# Patient Record
Sex: FEMALE | Race: BLACK OR AFRICAN AMERICAN | NOT HISPANIC OR LATINO | Employment: FULL TIME | URBAN - METROPOLITAN AREA
[De-identification: names, ages, dates, MRNs, and addresses within clinical notes are randomized per-mention and may not be internally consistent; named-entity substitution may affect disease eponyms.]

---

## 2021-04-16 ENCOUNTER — BULK ORDERING (OUTPATIENT)
Dept: CASE MANAGEMENT | Facility: OTHER | Age: 26
End: 2021-04-16

## 2021-04-16 DIAGNOSIS — Z23 IMMUNIZATION DUE: ICD-10-CM

## 2023-08-28 LAB
ALBUMIN SERPL-MCNC: 4 G/DL (ref 3.5–5.2)
ALBUMIN/GLOB SERPL: 1.4 G/DL
ALP SERPL-CCNC: 76 U/L (ref 39–117)
ALT SERPL W P-5'-P-CCNC: <5 U/L (ref 1–33)
ANION GAP SERPL CALCULATED.3IONS-SCNC: 10.6 MMOL/L (ref 5–15)
AST SERPL-CCNC: 11 U/L (ref 1–32)
BASOPHILS # BLD AUTO: 0.01 10*3/MM3 (ref 0–0.2)
BASOPHILS NFR BLD AUTO: 0.3 % (ref 0–1.5)
BILIRUB SERPL-MCNC: 0.2 MG/DL (ref 0–1.2)
BUN SERPL-MCNC: 13 MG/DL (ref 6–20)
BUN/CREAT SERPL: 17.3 (ref 7–25)
CALCIUM SPEC-SCNC: 9.3 MG/DL (ref 8.6–10.5)
CHLORIDE SERPL-SCNC: 107 MMOL/L (ref 98–107)
CO2 SERPL-SCNC: 23.4 MMOL/L (ref 22–29)
CREAT SERPL-MCNC: 0.75 MG/DL (ref 0.57–1)
DEPRECATED RDW RBC AUTO: 38.6 FL (ref 37–54)
EGFRCR SERPLBLD CKD-EPI 2021: 110.7 ML/MIN/1.73
EOSINOPHIL # BLD AUTO: 0.09 10*3/MM3 (ref 0–0.4)
EOSINOPHIL NFR BLD AUTO: 2.4 % (ref 0.3–6.2)
ERYTHROCYTE [DISTWIDTH] IN BLOOD BY AUTOMATED COUNT: 12.8 % (ref 12.3–15.4)
GLOBULIN UR ELPH-MCNC: 2.9 GM/DL
GLUCOSE BLDC GLUCOMTR-MCNC: 119 MG/DL (ref 70–130)
GLUCOSE SERPL-MCNC: 101 MG/DL (ref 65–99)
HCT VFR BLD AUTO: 33.1 % (ref 34–46.6)
HGB BLD-MCNC: 10.9 G/DL (ref 12–15.9)
IMM GRANULOCYTES # BLD AUTO: 0.01 10*3/MM3 (ref 0–0.05)
IMM GRANULOCYTES NFR BLD AUTO: 0.3 % (ref 0–0.5)
LYMPHOCYTES # BLD AUTO: 1.94 10*3/MM3 (ref 0.7–3.1)
LYMPHOCYTES NFR BLD AUTO: 52.2 % (ref 19.6–45.3)
MCH RBC QN AUTO: 27.5 PG (ref 26.6–33)
MCHC RBC AUTO-ENTMCNC: 32.9 G/DL (ref 31.5–35.7)
MCV RBC AUTO: 83.4 FL (ref 79–97)
MONOCYTES # BLD AUTO: 0.28 10*3/MM3 (ref 0.1–0.9)
MONOCYTES NFR BLD AUTO: 7.5 % (ref 5–12)
NEUTROPHILS NFR BLD AUTO: 1.39 10*3/MM3 (ref 1.7–7)
NEUTROPHILS NFR BLD AUTO: 37.3 % (ref 42.7–76)
NRBC BLD AUTO-RTO: 0 /100 WBC (ref 0–0.2)
PLATELET # BLD AUTO: 237 10*3/MM3 (ref 140–450)
PMV BLD AUTO: 10.9 FL (ref 6–12)
POTASSIUM SERPL-SCNC: 3.6 MMOL/L (ref 3.5–5.2)
PROT SERPL-MCNC: 6.9 G/DL (ref 6–8.5)
RBC # BLD AUTO: 3.97 10*6/MM3 (ref 3.77–5.28)
SODIUM SERPL-SCNC: 141 MMOL/L (ref 136–145)
WBC NRBC COR # BLD: 3.72 10*3/MM3 (ref 3.4–10.8)

## 2023-08-28 PROCEDURE — 36415 COLL VENOUS BLD VENIPUNCTURE: CPT

## 2023-08-28 PROCEDURE — 85025 COMPLETE CBC W/AUTO DIFF WBC: CPT

## 2023-08-28 PROCEDURE — 99283 EMERGENCY DEPT VISIT LOW MDM: CPT

## 2023-08-28 PROCEDURE — 82948 REAGENT STRIP/BLOOD GLUCOSE: CPT

## 2023-08-28 PROCEDURE — 93010 ELECTROCARDIOGRAM REPORT: CPT | Performed by: INTERNAL MEDICINE

## 2023-08-28 PROCEDURE — 80053 COMPREHEN METABOLIC PANEL: CPT

## 2023-08-28 PROCEDURE — 84703 CHORIONIC GONADOTROPIN ASSAY: CPT | Performed by: EMERGENCY MEDICINE

## 2023-08-28 PROCEDURE — 93005 ELECTROCARDIOGRAM TRACING: CPT

## 2023-08-28 RX ORDER — SODIUM CHLORIDE 0.9 % (FLUSH) 0.9 %
10 SYRINGE (ML) INJECTION AS NEEDED
Status: DISCONTINUED | OUTPATIENT
Start: 2023-08-28 | End: 2023-08-29 | Stop reason: HOSPADM

## 2023-08-29 ENCOUNTER — HOSPITAL ENCOUNTER (EMERGENCY)
Facility: HOSPITAL | Age: 28
Discharge: HOME | End: 2023-08-29
Attending: EMERGENCY MEDICINE
Payer: COMMERCIAL

## 2023-08-29 VITALS
BODY MASS INDEX: 26.51 KG/M2 | WEIGHT: 179 LBS | DIASTOLIC BLOOD PRESSURE: 82 MMHG | HEART RATE: 54 BPM | SYSTOLIC BLOOD PRESSURE: 117 MMHG | OXYGEN SATURATION: 100 % | TEMPERATURE: 98.4 F | RESPIRATION RATE: 16 BRPM | HEIGHT: 69 IN

## 2023-08-29 DIAGNOSIS — R53.1 WEAKNESS: ICD-10-CM

## 2023-08-29 DIAGNOSIS — R51.9 NONINTRACTABLE HEADACHE, UNSPECIFIED CHRONICITY PATTERN, UNSPECIFIED HEADACHE TYPE: Primary | ICD-10-CM

## 2023-08-29 DIAGNOSIS — R42 LIGHTHEADEDNESS: ICD-10-CM

## 2023-08-29 LAB
BACTERIA UR QL AUTO: ABNORMAL /HPF
BILIRUB UR QL STRIP: NEGATIVE
CLARITY UR: CLEAR
COLOR UR: YELLOW
GLUCOSE UR STRIP-MCNC: NEGATIVE MG/DL
HCG SERPL QL: NEGATIVE
HGB UR QL STRIP.AUTO: ABNORMAL
HOLD SPECIMEN: NORMAL
HOLD SPECIMEN: NORMAL
HYALINE CASTS UR QL AUTO: ABNORMAL /LPF
KETONES UR QL STRIP: NEGATIVE
LEUKOCYTE ESTERASE UR QL STRIP.AUTO: NEGATIVE
NITRITE UR QL STRIP: NEGATIVE
PH UR STRIP.AUTO: 6.5 [PH] (ref 5–8)
PROT UR QL STRIP: NEGATIVE
QT INTERVAL: 410 MS
QTC INTERVAL: 385 MS
RBC # UR STRIP: ABNORMAL /HPF
REF LAB TEST METHOD: ABNORMAL
SP GR UR STRIP: 1.02 (ref 1–1.03)
SQUAMOUS #/AREA URNS HPF: ABNORMAL /HPF
UROBILINOGEN UR QL STRIP: ABNORMAL
WBC # UR STRIP: ABNORMAL /HPF
WHOLE BLOOD HOLD COAG: NORMAL
WHOLE BLOOD HOLD SPECIMEN: NORMAL

## 2023-08-29 PROCEDURE — 81001 URINALYSIS AUTO W/SCOPE: CPT

## 2023-08-29 NOTE — DISCHARGE INSTRUCTIONS
Rest as needed.  Drink plenty of fluids as tolerated.  Please return to the emergency room for any worsening symptoms or concerns.

## 2023-08-29 NOTE — Clinical Note
[facility] EMERGENCY DEPARTMENT  [address] [facility] [address]  Phone: [phone]    Osbaldo Mayo was seen and treated in our emergency department on 8/28/2023.  She may return to work on 08/30/2023.         Thank you for choosing [facility].    Holli Lawrence RN

## 2023-08-29 NOTE — ED PROVIDER NOTES
EMERGENCY DEPARTMENT ENCOUNTER    Room Number:  21/21  Date seen:  8/29/2023  PCP: Provider, No Known  Historian(s): Patient      HPI:  Chief Complaint: Lightheadedness, headache, nausea  A complete HPI/ROS/PMH/PSH/SH/FH are unobtainable / limited due to: None   Context: Osbaldo Mayo is a 27 y.o. female who presents to the ED c/o headache with lightheadedness and fatigue that is worrisome for possible anemia problem.  Patient has had anemia in the past and she was previously taking iron supplements but stopped that medication several months ago.  She just began her menstrual cycle this week.  Today she became concerned about increasing fatigue and some nausea and some near syncopal feelings.  The symptoms reminded her of when she was significantly anemic earlier and so she was concerned that perhaps her blood counts were trending down again.  She denies any fevers or cough or cold or flulike symptoms.  She did not vomit with her nausea today.  She has a generalized headache.  She has been trying to drink plenty of fluids today.        PAST MEDICAL HISTORY  Active Ambulatory Problems     Diagnosis Date Noted    No Active Ambulatory Problems     Resolved Ambulatory Problems     Diagnosis Date Noted    No Resolved Ambulatory Problems     Past Medical History:   Diagnosis Date    Anemia     Hypoglycemia          PAST SURGICAL HISTORY  History reviewed. No pertinent surgical history.      FAMILY HISTORY  Family History   Problem Relation Age of Onset    No Known Problems Mother     No Known Problems Father     No Known Problems Sister     No Known Problems Brother     No Known Problems Maternal Aunt     No Known Problems Maternal Uncle     No Known Problems Paternal Aunt     No Known Problems Paternal Uncle     No Known Problems Maternal Grandmother     No Known Problems Maternal Grandfather     No Known Problems Paternal Grandmother     No Known Problems Paternal Grandfather     Cancer Other         Breat     Diabetes Other     Hypertension Other     Anesthesia problems Neg Hx     Broken bones Neg Hx     Clotting disorder Neg Hx     Collagen disease Neg Hx     Dislocations Neg Hx     Osteoporosis Neg Hx     Rheumatologic disease Neg Hx     Scoliosis Neg Hx     Severe sprains Neg Hx          SOCIAL HISTORY  Social History     Socioeconomic History    Marital status: Single   Tobacco Use    Smoking status: Every Day     Packs/day: 0.50     Types: Cigarettes   Substance and Sexual Activity    Alcohol use: Not Currently     Comment: occasional    Drug use: No    Sexual activity: Defer         ALLERGIES  Patient has no known allergies.        REVIEW OF SYSTEMS  Review of Systems   Constitutional:  Positive for fatigue. Negative for activity change and fever.   Eyes:  Negative for pain and visual disturbance.   Respiratory:  Negative for cough and shortness of breath.    Cardiovascular:  Negative for chest pain.   Gastrointestinal:  Positive for nausea. Negative for abdominal pain, blood in stool and vomiting.   Genitourinary:  Positive for vaginal bleeding. Negative for dysuria.   Skin:  Negative for color change.   Neurological:  Positive for weakness, light-headedness and headaches. Negative for syncope.   All other systems reviewed and are negative.         PHYSICAL EXAM  ED Triage Vitals   Temp Heart Rate Resp BP SpO2   08/28/23 2239 08/28/23 2239 08/28/23 2239 08/28/23 2244 08/28/23 2239   98.4 °F (36.9 °C) 68 16 123/78 100 %      Temp src Heart Rate Source Patient Position BP Location FiO2 (%)   08/28/23 2239 08/28/23 2239 08/28/23 2244 08/28/23 2244 --   Tympanic Monitor Sitting Left arm        Physical Exam      GENERAL: Calm, nontoxic appearing, no diaphoresis, no acute distress  HENT: nares patent, normocephalic and atraumatic  EYES: no scleral icterus, EOMI, normal conjunctivae  CV: regular rhythm, normal rate, normal distal pulses, no murmurs  RESPIRATORY: normal effort, no stridor lungs clear to auscultation  bilaterally  ABDOMEN: soft nontender in all quadrants  MUSCULOSKELETAL: no deformity, no asymmetry  NEURO: alert, moves all extremities, follows commands  PSYCH:  calm, cooperative  SKIN: warm, dry    Vital signs and nursing notes reviewed.        LAB RESULTS  Recent Results (from the past 24 hour(s))   Comprehensive Metabolic Panel    Collection Time: 08/28/23 10:49 PM    Specimen: Arm, Left; Blood   Result Value Ref Range    Glucose 101 (H) 65 - 99 mg/dL    BUN 13 6 - 20 mg/dL    Creatinine 0.75 0.57 - 1.00 mg/dL    Sodium 141 136 - 145 mmol/L    Potassium 3.6 3.5 - 5.2 mmol/L    Chloride 107 98 - 107 mmol/L    CO2 23.4 22.0 - 29.0 mmol/L    Calcium 9.3 8.6 - 10.5 mg/dL    Total Protein 6.9 6.0 - 8.5 g/dL    Albumin 4.0 3.5 - 5.2 g/dL    ALT (SGPT) <5 1 - 33 U/L    AST (SGOT) 11 1 - 32 U/L    Alkaline Phosphatase 76 39 - 117 U/L    Total Bilirubin 0.2 0.0 - 1.2 mg/dL    Globulin 2.9 gm/dL    A/G Ratio 1.4 g/dL    BUN/Creatinine Ratio 17.3 7.0 - 25.0    Anion Gap 10.6 5.0 - 15.0 mmol/L    eGFR 110.7 >60.0 mL/min/1.73   Green Top (Gel)    Collection Time: 08/28/23 10:49 PM   Result Value Ref Range    Extra Tube Hold for add-ons.    Lavender Top    Collection Time: 08/28/23 10:49 PM   Result Value Ref Range    Extra Tube hold for add-on    Gold Top - SST    Collection Time: 08/28/23 10:49 PM   Result Value Ref Range    Extra Tube Hold for add-ons.    Light Blue Top    Collection Time: 08/28/23 10:49 PM   Result Value Ref Range    Extra Tube Hold for add-ons.    CBC Auto Differential    Collection Time: 08/28/23 10:49 PM    Specimen: Arm, Left; Blood   Result Value Ref Range    WBC 3.72 3.40 - 10.80 10*3/mm3    RBC 3.97 3.77 - 5.28 10*6/mm3    Hemoglobin 10.9 (L) 12.0 - 15.9 g/dL    Hematocrit 33.1 (L) 34.0 - 46.6 %    MCV 83.4 79.0 - 97.0 fL    MCH 27.5 26.6 - 33.0 pg    MCHC 32.9 31.5 - 35.7 g/dL    RDW 12.8 12.3 - 15.4 %    RDW-SD 38.6 37.0 - 54.0 fl    MPV 10.9 6.0 - 12.0 fL    Platelets 237 140 - 450 10*3/mm3     Neutrophil % 37.3 (L) 42.7 - 76.0 %    Lymphocyte % 52.2 (H) 19.6 - 45.3 %    Monocyte % 7.5 5.0 - 12.0 %    Eosinophil % 2.4 0.3 - 6.2 %    Basophil % 0.3 0.0 - 1.5 %    Immature Grans % 0.3 0.0 - 0.5 %    Neutrophils, Absolute 1.39 (L) 1.70 - 7.00 10*3/mm3    Lymphocytes, Absolute 1.94 0.70 - 3.10 10*3/mm3    Monocytes, Absolute 0.28 0.10 - 0.90 10*3/mm3    Eosinophils, Absolute 0.09 0.00 - 0.40 10*3/mm3    Basophils, Absolute 0.01 0.00 - 0.20 10*3/mm3    Immature Grans, Absolute 0.01 0.00 - 0.05 10*3/mm3    nRBC 0.0 0.0 - 0.2 /100 WBC   hCG, Serum, Qualitative    Collection Time: 08/28/23 10:49 PM    Specimen: Arm, Left; Blood   Result Value Ref Range    HCG Qualitative Negative Negative   POC Glucose Once    Collection Time: 08/28/23 10:53 PM    Specimen: Blood   Result Value Ref Range    Glucose 119 70 - 130 mg/dL   ECG 12 Lead ED Triage Standing Order; Weak / Dizzy / AMS    Collection Time: 08/28/23 10:55 PM   Result Value Ref Range    QT Interval 410 ms    QTC Interval 385 ms   Urinalysis With Microscopic If Indicated (No Culture) - Urine, Clean Catch    Collection Time: 08/29/23 12:55 AM    Specimen: Urine, Clean Catch   Result Value Ref Range    Color, UA Yellow Yellow, Straw    Appearance, UA Clear Clear    pH, UA 6.5 5.0 - 8.0    Specific Gravity, UA 1.025 1.005 - 1.030    Glucose, UA Negative Negative    Ketones, UA Negative Negative    Bilirubin, UA Negative Negative    Blood, UA Moderate (2+) (A) Negative    Protein, UA Negative Negative    Leuk Esterase, UA Negative Negative    Nitrite, UA Negative Negative    Urobilinogen, UA 1.0 E.U./dL 0.2 - 1.0 E.U./dL   Urinalysis, Microscopic Only - Urine, Clean Catch    Collection Time: 08/29/23 12:55 AM    Specimen: Urine, Clean Catch   Result Value Ref Range    RBC, UA Too Numerous to Count (A) None Seen, 0-2 /HPF    WBC, UA 0-2 None Seen, 0-2 /HPF    Bacteria, UA None Seen None Seen /HPF    Squamous Epithelial Cells, UA 0-2 None Seen, 0-2 /HPF    Hyaline  Casts, UA 0-2 None Seen /LPF    Methodology Automated Microscopy        Ordered the above labs and reviewed the results.        RADIOLOGY  No Radiology Exams Resulted Within Past 24 Hours    Ordered the above noted radiological studies. Reviewed by me in PACS.          PROCEDURES  Procedures      MEDICATIONS GIVEN IN ER  Medications   sodium chloride 0.9 % flush 10 mL (has no administration in time range)           MEDICAL DECISION MAKING, PROGRESS, and CONSULTS    All labs have been independently reviewed by me.  All radiology studies have been reviewed by me and I have also reviewed the radiology report.   EKG's independently viewed and interpreted by me.  Discussion below represents my analysis of pertinent findings related to patient's condition, differential diagnosis, treatment plan and final disposition.    EKG           EKG time/Interp time: 2255/2300  Rhythm/Rate: Sinus rhythm, 53 bpm  P waves and NE: Present, 186 ms  QRS, axis: 87 milliseconds, normal axis  ST and T waves: No ST segment elevations notable.    Independently interpreted by me contemporaneously with treatment    Additional sources:  - Discussed/ obtained information from independent historians: None    - External (non-ED) record review: I reviewed the OB/GYN office note from April 19, 2023 when she had postpartum care follow-up visit.        Orders placed during this visit:  Orders Placed This Encounter   Procedures    [place] Draw    Comprehensive Metabolic Panel    Urinalysis With Microscopic If Indicated (No Culture) - Urine, Clean Catch    CBC Auto Differential    hCG, Serum, Qualitative    Urinalysis, Microscopic Only - Urine, Clean Catch    NPO Diet NPO Type: Strict NPO    Undress & Gown    Continuous Pulse Oximetry    Vital Signs    Orthostatic Blood Pressure    Oxygen Therapy- Nasal Cannula; Titrate 1-6 LPM Per SpO2; 90 - 95%    POC Glucose Once    POC Glucose Once    ECG 12 Lead ED Triage Standing Order; Weak / Dizzy / AMS    Insert  Peripheral IV    Fall Precautions    CBC & Differential    Green Top (Gel)    Lavender Top    Gold Top - SST    Light Blue Top       Differential diagnosis includes but is not limited to:    Anemia, dehydration, hypoglycemia, viral illness      Independent interpretation of labs, radiology studies, and discussions with consultants:  ED Course as of 08/29/23 0255   Tue Aug 29, 2023   0026 Hemoglobin(!): 10.9 [PADMAJA]   0026 Hematocrit(!): 33.1 [PADMAJA]   0255 RBC, UA(!): Too Numerous to Count  Menstruating [PADMAJA]   0255 Physical exam is nonworrisome.  Vital signs are okay.  I reassured the patient that her hemoglobin and hematocrit are not alarming at this time and are certainly improved in comparison to previous records.  I think she is safe to discharge home for continued symptomatic management.  Will encourage prompt follow-up with PCP for ongoing care needs. [PADMAJA]      ED Course User Index  [PADMAJA] Rickie Paredes MD         DIAGNOSIS  Final diagnoses:   Nonintractable headache, unspecified chronicity pattern, unspecified headache type   Weakness   Lightheadedness         DISPOSITION  Discharge        Latest Documented Vital Signs:  As of 02:55 EDT  BP- 117/82 HR- 54 Temp- 98.4 °F (36.9 °C) (Tympanic) O2 sat- 100%              --    Please note that portions of this were completed with a voice recognition program.       Note Disclaimer: At [facility], we believe that sharing information builds trust and better relationships. You are receiving this note because you are receiving care at [facility] or recently visited. It is possible you will see health information before a provider has talked with you about it. This kind of information can be easy to misunderstand. To help you fully understand what it means for your health, we urge you to discuss this note with your provider.             Rickie Paredes MD  08/29/23 0255

## 2023-10-16 ENCOUNTER — HOSPITAL ENCOUNTER (OUTPATIENT)
Facility: HOSPITAL | Age: 28
Setting detail: OBSERVATION
Discharge: HOME | End: 2023-10-17
Attending: EMERGENCY MEDICINE | Admitting: STUDENT IN AN ORGANIZED HEALTH CARE EDUCATION/TRAINING PROGRAM
Payer: COMMERCIAL

## 2023-10-16 DIAGNOSIS — O21.0 HYPEREMESIS GRAVIDARUM: Primary | ICD-10-CM

## 2023-10-16 DIAGNOSIS — E87.6 HYPOKALEMIA: ICD-10-CM

## 2023-10-16 LAB
ALBUMIN SERPL-MCNC: 4.4 G/DL (ref 3.5–5.2)
ALBUMIN/GLOB SERPL: 1.3 G/DL
ALP SERPL-CCNC: 87 U/L (ref 39–117)
ALT SERPL W P-5'-P-CCNC: 7 U/L (ref 1–33)
ANION GAP SERPL CALCULATED.3IONS-SCNC: 14 MMOL/L (ref 5–15)
AST SERPL-CCNC: 8 U/L (ref 1–32)
BACTERIA UR QL AUTO: ABNORMAL /HPF
BASOPHILS # BLD AUTO: 0.02 10*3/MM3 (ref 0–0.2)
BASOPHILS NFR BLD AUTO: 0.4 % (ref 0–1.5)
BILIRUB SERPL-MCNC: 0.6 MG/DL (ref 0–1.2)
BILIRUB UR QL STRIP: NEGATIVE
BUN SERPL-MCNC: 6 MG/DL (ref 6–20)
BUN/CREAT SERPL: 10 (ref 7–25)
CALCIUM SPEC-SCNC: 10.1 MG/DL (ref 8.6–10.5)
CHLORIDE SERPL-SCNC: 100 MMOL/L (ref 98–107)
CLARITY UR: ABNORMAL
CO2 SERPL-SCNC: 22 MMOL/L (ref 22–29)
COLOR UR: YELLOW
CREAT SERPL-MCNC: 0.6 MG/DL (ref 0.57–1)
D-LACTATE SERPL-SCNC: 1.2 MMOL/L (ref 0.5–2)
DEPRECATED RDW RBC AUTO: 39.1 FL (ref 37–54)
EGFRCR SERPLBLD CKD-EPI 2021: 124.8 ML/MIN/1.73
EOSINOPHIL # BLD AUTO: 0.08 10*3/MM3 (ref 0–0.4)
EOSINOPHIL NFR BLD AUTO: 1.4 % (ref 0.3–6.2)
ERYTHROCYTE [DISTWIDTH] IN BLOOD BY AUTOMATED COUNT: 13.7 % (ref 12.3–15.4)
GLOBULIN UR ELPH-MCNC: 3.5 GM/DL
GLUCOSE SERPL-MCNC: 97 MG/DL (ref 65–99)
GLUCOSE UR STRIP-MCNC: NEGATIVE MG/DL
HCG INTACT+B SERPL-ACNC: NORMAL MIU/ML
HCT VFR BLD AUTO: 38.6 % (ref 34–46.6)
HGB BLD-MCNC: 12.4 G/DL (ref 12–15.9)
HGB UR QL STRIP.AUTO: NEGATIVE
HYALINE CASTS UR QL AUTO: ABNORMAL /LPF
IMM GRANULOCYTES # BLD AUTO: 0.01 10*3/MM3 (ref 0–0.05)
IMM GRANULOCYTES NFR BLD AUTO: 0.2 % (ref 0–0.5)
KETONES UR QL STRIP: ABNORMAL
LEUKOCYTE ESTERASE UR QL STRIP.AUTO: ABNORMAL
LIPASE SERPL-CCNC: 9 U/L (ref 13–60)
LYMPHOCYTES # BLD AUTO: 1.41 10*3/MM3 (ref 0.7–3.1)
LYMPHOCYTES NFR BLD AUTO: 25.1 % (ref 19.6–45.3)
MCH RBC QN AUTO: 25.5 PG (ref 26.6–33)
MCHC RBC AUTO-ENTMCNC: 32.1 G/DL (ref 31.5–35.7)
MCV RBC AUTO: 79.3 FL (ref 79–97)
MONOCYTES # BLD AUTO: 0.54 10*3/MM3 (ref 0.1–0.9)
MONOCYTES NFR BLD AUTO: 9.6 % (ref 5–12)
NEUTROPHILS NFR BLD AUTO: 3.56 10*3/MM3 (ref 1.7–7)
NEUTROPHILS NFR BLD AUTO: 63.3 % (ref 42.7–76)
NITRITE UR QL STRIP: NEGATIVE
NRBC BLD AUTO-RTO: 0 /100 WBC (ref 0–0.2)
PH UR STRIP.AUTO: 6.5 [PH] (ref 5–8)
PLATELET # BLD AUTO: 293 10*3/MM3 (ref 140–450)
PMV BLD AUTO: 10.6 FL (ref 6–12)
POTASSIUM SERPL-SCNC: 3.2 MMOL/L (ref 3.5–5.2)
PROT SERPL-MCNC: 7.9 G/DL (ref 6–8.5)
PROT UR QL STRIP: ABNORMAL
RBC # BLD AUTO: 4.87 10*6/MM3 (ref 3.77–5.28)
RBC # UR STRIP: ABNORMAL /HPF
REF LAB TEST METHOD: ABNORMAL
SODIUM SERPL-SCNC: 136 MMOL/L (ref 136–145)
SP GR UR STRIP: 1.03 (ref 1–1.03)
SQUAMOUS #/AREA URNS HPF: ABNORMAL /HPF
UROBILINOGEN UR QL STRIP: ABNORMAL
WBC # UR STRIP: ABNORMAL /HPF
WBC NRBC COR # BLD: 5.62 10*3/MM3 (ref 3.4–10.8)

## 2023-10-16 PROCEDURE — 80053 COMPREHEN METABOLIC PANEL: CPT | Performed by: EMERGENCY MEDICINE

## 2023-10-16 PROCEDURE — G0378 HOSPITAL OBSERVATION PER HR: HCPCS

## 2023-10-16 PROCEDURE — 25810000003 LACTATED RINGERS SOLUTION: Performed by: EMERGENCY MEDICINE

## 2023-10-16 PROCEDURE — 96361 HYDRATE IV INFUSION ADD-ON: CPT

## 2023-10-16 PROCEDURE — 96374 THER/PROPH/DIAG INJ IV PUSH: CPT

## 2023-10-16 PROCEDURE — 83690 ASSAY OF LIPASE: CPT | Performed by: EMERGENCY MEDICINE

## 2023-10-16 PROCEDURE — 81001 URINALYSIS AUTO W/SCOPE: CPT | Performed by: EMERGENCY MEDICINE

## 2023-10-16 PROCEDURE — 85025 COMPLETE CBC W/AUTO DIFF WBC: CPT | Performed by: EMERGENCY MEDICINE

## 2023-10-16 PROCEDURE — 25010000002 METOCLOPRAMIDE PER 10 MG: Performed by: PHYSICIAN ASSISTANT

## 2023-10-16 PROCEDURE — 83605 ASSAY OF LACTIC ACID: CPT | Performed by: EMERGENCY MEDICINE

## 2023-10-16 PROCEDURE — 25010000002 ONDANSETRON PER 1 MG: Performed by: PHYSICIAN ASSISTANT

## 2023-10-16 PROCEDURE — 99284 EMERGENCY DEPT VISIT MOD MDM: CPT

## 2023-10-16 PROCEDURE — 96376 TX/PRO/DX INJ SAME DRUG ADON: CPT

## 2023-10-16 PROCEDURE — 25010000002 ONDANSETRON PER 1 MG: Performed by: EMERGENCY MEDICINE

## 2023-10-16 PROCEDURE — 84702 CHORIONIC GONADOTROPIN TEST: CPT | Performed by: EMERGENCY MEDICINE

## 2023-10-16 PROCEDURE — 96375 TX/PRO/DX INJ NEW DRUG ADDON: CPT

## 2023-10-16 RX ORDER — ONDANSETRON 2 MG/ML
4 INJECTION INTRAMUSCULAR; INTRAVENOUS ONCE
Status: COMPLETED | OUTPATIENT
Start: 2023-10-16 | End: 2023-10-16

## 2023-10-16 RX ORDER — ACETAMINOPHEN 325 MG/1
650 TABLET ORAL EVERY 4 HOURS PRN
Status: DISCONTINUED | OUTPATIENT
Start: 2023-10-16 | End: 2023-10-17 | Stop reason: HOSPADM

## 2023-10-16 RX ORDER — METOCLOPRAMIDE HYDROCHLORIDE 5 MG/ML
10 INJECTION INTRAMUSCULAR; INTRAVENOUS ONCE
Status: COMPLETED | OUTPATIENT
Start: 2023-10-16 | End: 2023-10-16

## 2023-10-16 RX ORDER — SODIUM CHLORIDE 9 MG/ML
125 INJECTION, SOLUTION INTRAVENOUS CONTINUOUS
Status: DISCONTINUED | OUTPATIENT
Start: 2023-10-16 | End: 2023-10-16

## 2023-10-16 RX ORDER — DEXTROSE, SODIUM CHLORIDE, SODIUM LACTATE, POTASSIUM CHLORIDE, AND CALCIUM CHLORIDE 5; .6; .31; .03; .02 G/100ML; G/100ML; G/100ML; G/100ML; G/100ML
100 INJECTION, SOLUTION INTRAVENOUS CONTINUOUS
Status: DISCONTINUED | OUTPATIENT
Start: 2023-10-16 | End: 2023-10-16

## 2023-10-16 RX ORDER — ALUMINA, MAGNESIA, AND SIMETHICONE 2400; 2400; 240 MG/30ML; MG/30ML; MG/30ML
15 SUSPENSION ORAL ONCE
Status: COMPLETED | OUTPATIENT
Start: 2023-10-16 | End: 2023-10-16

## 2023-10-16 RX ORDER — SODIUM CHLORIDE 0.9 % (FLUSH) 0.9 %
10 SYRINGE (ML) INJECTION AS NEEDED
Status: DISCONTINUED | OUTPATIENT
Start: 2023-10-16 | End: 2023-10-17 | Stop reason: HOSPADM

## 2023-10-16 RX ORDER — SODIUM CHLORIDE 9 MG/ML
40 INJECTION, SOLUTION INTRAVENOUS AS NEEDED
Status: DISCONTINUED | OUTPATIENT
Start: 2023-10-16 | End: 2023-10-17 | Stop reason: HOSPADM

## 2023-10-16 RX ORDER — DEXTROSE, SODIUM CHLORIDE, SODIUM LACTATE, POTASSIUM CHLORIDE, AND CALCIUM CHLORIDE 5; .6; .31; .03; .02 G/100ML; G/100ML; G/100ML; G/100ML; G/100ML
100 INJECTION, SOLUTION INTRAVENOUS CONTINUOUS
Status: DISCONTINUED | OUTPATIENT
Start: 2023-10-17 | End: 2023-10-17 | Stop reason: HOSPADM

## 2023-10-16 RX ORDER — SODIUM CHLORIDE 0.9 % (FLUSH) 0.9 %
10 SYRINGE (ML) INJECTION EVERY 12 HOURS SCHEDULED
Status: DISCONTINUED | OUTPATIENT
Start: 2023-10-16 | End: 2023-10-17 | Stop reason: HOSPADM

## 2023-10-16 RX ORDER — ONDANSETRON 2 MG/ML
4 INJECTION INTRAMUSCULAR; INTRAVENOUS EVERY 6 HOURS PRN
Status: DISCONTINUED | OUTPATIENT
Start: 2023-10-16 | End: 2023-10-17 | Stop reason: HOSPADM

## 2023-10-16 RX ORDER — ONDANSETRON 4 MG/1
4 TABLET, FILM COATED ORAL EVERY 6 HOURS PRN
Status: DISCONTINUED | OUTPATIENT
Start: 2023-10-16 | End: 2023-10-17 | Stop reason: HOSPADM

## 2023-10-16 RX ORDER — DEXTROSE MONOHYDRATE, SODIUM CHLORIDE, AND POTASSIUM CHLORIDE 50; 1.49; 9 G/1000ML; G/1000ML; G/1000ML
100 INJECTION, SOLUTION INTRAVENOUS CONTINUOUS
Status: DISPENSED | OUTPATIENT
Start: 2023-10-16 | End: 2023-10-17

## 2023-10-16 RX ADMIN — ONDANSETRON 4 MG: 2 INJECTION INTRAMUSCULAR; INTRAVENOUS at 21:06

## 2023-10-16 RX ADMIN — ONDANSETRON 4 MG: 2 INJECTION INTRAMUSCULAR; INTRAVENOUS at 12:17

## 2023-10-16 RX ADMIN — ONDANSETRON 4 MG: 2 INJECTION INTRAMUSCULAR; INTRAVENOUS at 14:58

## 2023-10-16 RX ADMIN — Medication 10 ML: at 20:58

## 2023-10-16 RX ADMIN — SODIUM CHLORIDE, POTASSIUM CHLORIDE, SODIUM LACTATE AND CALCIUM CHLORIDE 1000 ML: 600; 310; 30; 20 INJECTION, SOLUTION INTRAVENOUS at 12:18

## 2023-10-16 RX ADMIN — ALUMINUM HYDROXIDE, MAGNESIUM HYDROXIDE, AND DIMETHICONE 15 ML: 400; 400; 40 SUSPENSION ORAL at 14:57

## 2023-10-16 RX ADMIN — POTASSIUM CHLORIDE, DEXTROSE MONOHYDRATE AND SODIUM CHLORIDE 100 ML/HR: 150; 5; 900 INJECTION, SOLUTION INTRAVENOUS at 20:56

## 2023-10-16 RX ADMIN — METOCLOPRAMIDE 10 MG: 5 INJECTION, SOLUTION INTRAMUSCULAR; INTRAVENOUS at 17:34

## 2023-10-16 NOTE — ED NOTES
Nursing report ED to floor  Osbaldo Mayo  27 y.o.  female    HPI :   Chief Complaint   Patient presents with    Vomiting During Pregnancy       Admitting doctor:   Cliff Salinas MD    Admitting diagnosis:   The primary encounter diagnosis was Hyperemesis gravidarum. A diagnosis of Hypokalemia was also pertinent to this visit.    Code status:   Current Code Status       Date Active Code Status Order ID Comments User Context       10/16/2023 1521 CPR (Attempt to Resuscitate) [id number]  Sharri Bain PA ED        Question Answer    Code Status (Patient has no pulse and is not breathing) CPR (Attempt to Resuscitate)    Medical Interventions (Patient has pulse or is breathing) Full Support    Level Of Support Discussed With Patient                    Allergies:   Patient has no known allergies.    Isolation:   No active isolations    Intake and Output    Intake/Output Summary (Last 24 hours) at 10/16/2023 1541  Last data filed at 10/16/2023 1457  Gross per 24 hour   Intake 1000 ml   Output --   Net 1000 ml       Weight:       10/16/23  1157   Weight: 84.4 kg (186 lb)       Most recent vitals:   Vitals:    10/16/23 1331 10/16/23 1431 10/16/23 1501 10/16/23 1531   BP: 112/69 114/73 124/79 105/67   Pulse: 65 68 61 74   Resp:       Temp:       TempSrc:       SpO2: 100% 100% 100% 100%   Weight:       Height:           Active LDAs/IV Access:   Lines, Drains & Airways       Active LDAs       Name Placement date Placement time Site Days    Peripheral IV 10/16/23 1217 Anterior;Left;Proximal Forearm 10/16/23  1217  Forearm  less than 1    Peripheral IV 10/16/23 1219 Anterior;Left Forearm 10/16/23  1219  Forearm  less than 1                    Labs (abnormal labs have a star):   Labs Reviewed   COMPREHENSIVE METABOLIC PANEL - Abnormal; Notable for the following components:       Result Value    Potassium 3.2 (*)     All other components within normal limits    Narrative:     GFR Normal >60  Chronic Kidney Disease  <60  Kidney Failure <15     URINALYSIS W/ MICROSCOPIC IF INDICATED (NO CULTURE) - Abnormal; Notable for the following components:    Appearance, UA Cloudy (*)     Ketones, UA 80 mg/dL (3+) (*)     Protein, UA 30 mg/dL (1+) (*)     Leuk Esterase, UA Trace (*)     All other components within normal limits   LIPASE - Abnormal; Notable for the following components:    Lipase 9 (*)     All other components within normal limits   CBC WITH AUTO DIFFERENTIAL - Abnormal; Notable for the following components:    MCH 25.5 (*)     All other components within normal limits   URINALYSIS, MICROSCOPIC ONLY - Abnormal; Notable for the following components:    Bacteria, UA 1+ (*)     Squamous Epithelial Cells, UA 13-20 (*)     All other components within normal limits   LACTIC ACID, PLASMA - Normal   HCG, QUANTITATIVE, PREGNANCY    Narrative:     HCG Ranges by Gestational Age    Females - non-pregnant premenopausal   </= 1mIU/mL HCG  Females - postmenopausal               </= 7mIU/mL HCG    3 Weeks       5.4   -      72 mIU/mL  4 Weeks      10.2   -     708 mIU/mL  5 Weeks       217   -   8,245 mIU/mL  6 Weeks       152   -  32,177 mIU/mL  7 Weeks     4,059   - 153,767 mIU/mL  8 Weeks    31,366   - 149,094 mIU/mL  9 Weeks    59,109   - 135,901 mIU/mL  10 Weeks   44,186   - 170,409 mIU/mL  12 Weeks   27,107   - 201,615 mIU/mL  14 Weeks   24,302   -  93,646 mIU/mL  15 Weeks   12,540   -  69,747 mIU/mL  16 Weeks    8,904   -  55,332 mIU/mL  17 Weeks    8,240   -  51,793 mIU/mL  18 Weeks    9,649   -  55,271 mIU/mL    Results may be falsely decreased if patient taking Biotin.     CBC AND DIFFERENTIAL    Narrative:     The following orders were created for panel order CBC & Differential.  Procedure                               Abnormality         Status                     ---------                               -----------         ------                     CBC Auto Differential[[id number]]        Abnormal            Final result                  Please view results for these tests on the individual orders.       EKG:   No orders to display       Meds given in ED:   Medications   sodium chloride 0.9 % flush 10 mL (has no administration in time range)   sodium chloride 0.9 % infusion (has no administration in time range)   sodium chloride 0.9 % flush 10 mL (has no administration in time range)   sodium chloride 0.9 % flush 10 mL (has no administration in time range)   sodium chloride 0.9 % infusion 40 mL (has no administration in time range)   ondansetron (ZOFRAN) tablet 4 mg (has no administration in time range)     Or   ondansetron (ZOFRAN) injection 4 mg (has no administration in time range)   acetaminophen (TYLENOL) tablet 650 mg (has no administration in time range)   dextrose 5 % and lactated Ringer's infusion (has no administration in time range)   lactated ringers bolus 1,000 mL (0 mL Intravenous Stopped 10/16/23 1457)   ondansetron (ZOFRAN) injection 4 mg (4 mg Intravenous Given 10/16/23 1217)   ondansetron (ZOFRAN) injection 4 mg (4 mg Intravenous Given 10/16/23 1458)   aluminum-magnesium hydroxide-simethicone (MAALOX MAX) 400-400-40 MG/5ML suspension 15 mL (15 mL Oral Given 10/16/23 1457)       Imaging results:  No radiology results for the last day    Ambulatory status:   - Independent     Social issues:   Social History     Socioeconomic History    Marital status: Single   Tobacco Use    Smoking status: Every Day     Packs/day: .5     Types: Cigarettes   Substance and Sexual Activity    Alcohol use: Not Currently     Comment: occasional    Drug use: No    Sexual activity: Defer       NIH Stroke Scale:       Elizabeth Oscar RN  10/16/23 15:41 EDT     Call [phone] with any questions

## 2023-10-16 NOTE — H&P
[facility]   HISTORY AND PHYSICAL    Patient Name: Osbaldo Mayo  : 1995  MRN: [id number]  Primary Care Physician:  Provider, No Known  Date of admission: 10/16/2023    Subjective   Subjective     Chief Complaint:   Chief Complaint   Patient presents with    Vomiting During Pregnancy         HPI:    Osbaldo Mayo is a 27 y.o. female who is  approximately 7 weeks pregnant by dates presented to the emergency department today complaining of nausea and vomiting.  Patient has not had a chance to see her OB doctor yet for prenatal care, was told it is too early but has an appointment in November.  Patient reports history of hyperemesis gravidarum in previous pregnancies, states that she has needed IV fluids and Zofran pump at home.  Patient states she has been taking p.o. Zofran at home the past few days without resolution of her nausea.  Patient states she has not been able to eat or drink anything due to her symptoms.  She states now she feels like her hunger is causing more pain than anything else.  Patient denies abdominal cramping or vaginal bleeding.    ED evaluation reviewed, potassium 3.2, urine with 80 ketones, 30 mg/dL protein, 1+ bacteria.  Patient received IV Zofran 4 mg x 2 in ED without improvement in her symptoms.  Patient is being admitted to observation unit for further evaluation and treatment.    Review of Systems   All systems were reviewed and negative except for: What is discussed in the HPI    Personal History     Past Medical History:   Diagnosis Date    Anemia     Hypoglycemia        History reviewed. No pertinent surgical history.    Family History: family history includes Asthma in her brother and sister; Cancer in her maternal grandfather and another family member; Diabetes in her mother and another family member; Hypertension in her mother and another family member; No Known Problems in her father, maternal aunt, maternal grandmother, maternal uncle, paternal aunt,  paternal grandfather, paternal grandmother, and paternal uncle. Otherwise pertinent FHx was reviewed and not pertinent to current issue.    Social History:  reports that she quit smoking about 4 weeks ago. Her smoking use included cigars. She started smoking about 6 years ago. She has never used smokeless tobacco. She reports current alcohol use.    Home Medications:  HYDROcodone-acetaminophen, ferrous sulfate, and meloxicam    Allergies:  No Known Allergies    Objective   Objective     Vitals:   Temp:  [98.3 °F (36.8 °C)-98.4 °F (36.9 °C)] 98.4 °F (36.9 °C)  Heart Rate:  [] 73  Resp:  [18] 18  BP: ()/(67-79) 120/76  Physical Exam     GENERAL: 27 y.o. YO female a/o x 4, NAD  SKIN: Warm pink and dry   HEENT:  PERRL, EOM intact, conjunctivae normal, sclerae clear  NECK: supple, no JVD  LUNGS: Clear to auscultation bilaterally without wheezes, rales or rhonchi.  No accessory muscle use and no nasal flaring.  CARDIAC:  Regular rate and rhythm, S1-S2.  No murmurs, rubs or gallops.  No peripheral edema.  Equal pulses bilaterally.  ABDOMEN: Soft, nontender, nondistended.  No guarding or rebound tenderness.  Hyperactive bowel sounds.  MUSCULOSKELETAL: Moves all extremities well.  No deformity.  NEURO: Cranial nerves II through XII grossly intact.  No gross focal deficits.  Alert.  Normal speech and motor.  PSYCH: Normal mood and affect      Result Review    Result Review:  I have personally reviewed the results from the time of this admission to 10/16/2023 17:21 EDT and agree with these findings:  [x]  Laboratory list / accordion  []  Microbiology  []  Radiology  []  EKG/Telemetry   []  Cardiology/Vascular   []  Pathology  []  Old records  []  Other:  Most notable findings include: potassium 3.2, urine with 80 ketones, 30 mg/dL protein, 1+ bacteria    Assessment & Plan   Assessment / Plan     Brief Patient Summary:  Osbaldo Mayo is a 27 y.o. female who is being admitted observation unit for further evaluation  of nausea and vomiting    Active Hospital Problems:  Active Hospital Problems    Diagnosis     **Hyperemesis gravidarum      Plan:     Nausea and vomiting in pregnancy first trimester  -, approximately 7 weeks by dates  -Patient reports history of hyperemesis gravidarum in previous pregnancies  -IV fluids, D5 normal saline with 20mEq KCl to start, followed by D5 lactated Ringer's  -Clear liquid diet, advance as tolerated  -Antiemetics  -Continue to monitor    Hypokalemia  -Potassium 3.2  -We will give IV fluids with 20 mEq Kcl  -Trend with a.m. labs    DVT prophylaxis:  Mechanical DVT prophylaxis orders are present.    CODE STATUS:    Level Of Support Discussed With: Patient  Code Status (Patient has no pulse and is not breathing): CPR (Attempt to Resuscitate)  Medical Interventions (Patient has pulse or is breathing): Full Support    Admission Status:  I believe this patient meets observation status.     75 minutes has been spent by [facility] providers in the care of this patient while under observation status    I wore an appropriate PPE during this patient encounter.  Hand hygeine performed before and after seeing the patient.    Electronically signed by STEPHEN Rice, 10/16/23, 5:21 PM EDT.

## 2023-10-16 NOTE — ED PROVIDER NOTES
EMERGENCY DEPARTMENT ENCOUNTER    Room Number:  35/35  Date seen:  10/16/2023  PCP: Provider, No Known  Historian(s): Patient      HPI:  Chief Complaint: Repeated nausea and vomiting, generalized weakness, pregnant  A complete HPI/ROS/PMH/PSH/SH/FH are unobtainable / limited due to: None  Context: Osbaldo Mayo is a 27 y.o. female who presents to the ED c/o persistent and worsening nausea with vomiting and now generalized weakness for the past several days.  Patient is G4, P3.  She estimates that she is about 7 weeks pregnant by dates.  She has not had a chance to see her OB doctor yet for prenatal care but has an appointment in November to be seen.  She has been taking some dissolvable ondansetron tablets at home to try and help with her nausea symptoms but they do not seem to be relieving the nausea or stopping vomiting.  She tells me that she had similar problems with her past pregnancies involving hyperemesis gravidarum.  She tells me that she had been hospitalized in the past for IV fluid needs.  She denies any vaginal bleeding.  She says her urine has had a foul odor but she is not having any dysuria symptoms.  She expresses frustration that she has not felt well enough to care for her 3 children at home.        PAST MEDICAL HISTORY  Active Ambulatory Problems     Diagnosis Date Noted    No Active Ambulatory Problems     Resolved Ambulatory Problems     Diagnosis Date Noted    No Resolved Ambulatory Problems     Past Medical History:   Diagnosis Date    Anemia     Hypoglycemia          PAST SURGICAL HISTORY  History reviewed. No pertinent surgical history.      FAMILY HISTORY  Family History   Problem Relation Age of Onset    No Known Problems Mother     No Known Problems Father     No Known Problems Sister     No Known Problems Brother     No Known Problems Maternal Aunt     No Known Problems Maternal Uncle     No Known Problems Paternal Aunt     No Known Problems Paternal Uncle     No Known Problems  Maternal Grandmother     No Known Problems Maternal Grandfather     No Known Problems Paternal Grandmother     No Known Problems Paternal Grandfather     Cancer Other         Breat    Diabetes Other     Hypertension Other     Anesthesia problems Neg Hx     Broken bones Neg Hx     Clotting disorder Neg Hx     Collagen disease Neg Hx     Dislocations Neg Hx     Osteoporosis Neg Hx     Rheumatologic disease Neg Hx     Scoliosis Neg Hx     Severe sprains Neg Hx          SOCIAL HISTORY  Social History     Socioeconomic History    Marital status: Single   Tobacco Use    Smoking status: Every Day     Packs/day: .5     Types: Cigarettes   Substance and Sexual Activity    Alcohol use: Not Currently     Comment: occasional    Drug use: No    Sexual activity: Defer         ALLERGIES  Patient has no known allergies.        REVIEW OF SYSTEMS  Review of Systems   Constitutional:  Negative for activity change and fever.   HENT: Negative.     Eyes:  Negative for pain and visual disturbance.   Respiratory:  Negative for cough and shortness of breath.    Cardiovascular:  Positive for palpitations. Negative for chest pain.   Gastrointestinal:  Positive for nausea and vomiting. Negative for abdominal pain.   Genitourinary:  Negative for dysuria.   Skin:  Negative for color change.   Neurological:  Negative for syncope and headaches.   Psychiatric/Behavioral:  The patient is nervous/anxious.    All other systems reviewed and are negative.           PHYSICAL EXAM  ED Triage Vitals [10/16/23 1157]   Temp Heart Rate Resp BP SpO2   98.3 °F (36.8 °C) 120 18 -- 98 %      Temp src Heart Rate Source Patient Position BP Location FiO2 (%)   Tympanic Monitor -- -- --       Physical Exam      GENERAL: Sitting at the bedside and leaning forward, appears uncomfortable, tearful  HENT: nares patent, normocephalic and atraumatic, mucous membranes are somewhat dry.  EYES: no scleral icterus, EOMI, normal conjunctivae  CV: regular rhythm, tachycardic  rate, normal distal pulses  RESPIRATORY: normal effort, no stridor, lungs clear to auscultation bilaterally  ABDOMEN: soft, minimal tenderness to palpation diffusely but no peritoneal features elicited anywhere.  Bowel sounds present.  No masses palpable.  MUSCULOSKELETAL: no deformity, no asymmetry  NEURO: alert, moves all extremities, follows commands  PSYCH:  calm, cooperative  SKIN: warm, dry    Vital signs and nursing notes reviewed.        LAB RESULTS  Recent Results (from the past 24 hour(s))   Comprehensive Metabolic Panel    Collection Time: 10/16/23 12:21 PM    Specimen: Blood   Result Value Ref Range    Glucose 97 65 - 99 mg/dL    BUN 6 6 - 20 mg/dL    Creatinine 0.60 0.57 - 1.00 mg/dL    Sodium 136 136 - 145 mmol/L    Potassium 3.2 (L) 3.5 - 5.2 mmol/L    Chloride 100 98 - 107 mmol/L    CO2 22.0 22.0 - 29.0 mmol/L    Calcium 10.1 8.6 - 10.5 mg/dL    Total Protein 7.9 6.0 - 8.5 g/dL    Albumin 4.4 3.5 - 5.2 g/dL    ALT (SGPT) 7 1 - 33 U/L    AST (SGOT) 8 1 - 32 U/L    Alkaline Phosphatase 87 39 - 117 U/L    Total Bilirubin 0.6 0.0 - 1.2 mg/dL    Globulin 3.5 gm/dL    A/G Ratio 1.3 g/dL    BUN/Creatinine Ratio 10.0 7.0 - 25.0    Anion Gap 14.0 5.0 - 15.0 mmol/L    eGFR 124.8 >60.0 mL/min/1.73   Lactic Acid, Plasma    Collection Time: 10/16/23 12:21 PM    Specimen: Blood   Result Value Ref Range    Lactate 1.2 0.5 - 2.0 mmol/L   hCG, Quantitative, Pregnancy    Collection Time: 10/16/23 12:21 PM    Specimen: Blood   Result Value Ref Range    HCG Quantitative 78,653.00 mIU/mL   Lipase    Collection Time: 10/16/23 12:21 PM    Specimen: Blood   Result Value Ref Range    Lipase 9 (L) 13 - 60 U/L   CBC Auto Differential    Collection Time: 10/16/23 12:21 PM    Specimen: Blood   Result Value Ref Range    WBC 5.62 3.40 - 10.80 10*3/mm3    RBC 4.87 3.77 - 5.28 10*6/mm3    Hemoglobin 12.4 12.0 - 15.9 g/dL    Hematocrit 38.6 34.0 - 46.6 %    MCV 79.3 79.0 - 97.0 fL    MCH 25.5 (L) 26.6 - 33.0 pg    MCHC 32.1 31.5 -  35.7 g/dL    RDW 13.7 12.3 - 15.4 %    RDW-SD 39.1 37.0 - 54.0 fl    MPV 10.6 6.0 - 12.0 fL    Platelets 293 140 - 450 10*3/mm3    Neutrophil % 63.3 42.7 - 76.0 %    Lymphocyte % 25.1 19.6 - 45.3 %    Monocyte % 9.6 5.0 - 12.0 %    Eosinophil % 1.4 0.3 - 6.2 %    Basophil % 0.4 0.0 - 1.5 %    Immature Grans % 0.2 0.0 - 0.5 %    Neutrophils, Absolute 3.56 1.70 - 7.00 10*3/mm3    Lymphocytes, Absolute 1.41 0.70 - 3.10 10*3/mm3    Monocytes, Absolute 0.54 0.10 - 0.90 10*3/mm3    Eosinophils, Absolute 0.08 0.00 - 0.40 10*3/mm3    Basophils, Absolute 0.02 0.00 - 0.20 10*3/mm3    Immature Grans, Absolute 0.01 0.00 - 0.05 10*3/mm3    nRBC 0.0 0.0 - 0.2 /100 WBC   Urinalysis With Microscopic If Indicated (No Culture) - Urine, Clean Catch    Collection Time: 10/16/23  1:03 PM    Specimen: Urine, Clean Catch   Result Value Ref Range    Color, UA Yellow Yellow, Straw    Appearance, UA Cloudy (A) Clear    pH, UA 6.5 5.0 - 8.0    Specific Gravity, UA 1.028 1.005 - 1.030    Glucose, UA Negative Negative    Ketones, UA 80 mg/dL (3+) (A) Negative    Bilirubin, UA Negative Negative    Blood, UA Negative Negative    Protein, UA 30 mg/dL (1+) (A) Negative    Leuk Esterase, UA Trace (A) Negative    Nitrite, UA Negative Negative    Urobilinogen, UA 1.0 E.U./dL 0.2 - 1.0 E.U./dL   Urinalysis, Microscopic Only - Urine, Clean Catch    Collection Time: 10/16/23  1:03 PM    Specimen: Urine, Clean Catch   Result Value Ref Range    RBC, UA None Seen None Seen, 0-2 /HPF    WBC, UA 0-2 None Seen, 0-2 /HPF    Bacteria, UA 1+ (A) None Seen /HPF    Squamous Epithelial Cells, UA 13-20 (A) None Seen, 0-2 /HPF    Hyaline Casts, UA None Seen None Seen /LPF    Methodology Manual Light Microscopy        Ordered the above labs and reviewed the results.        RADIOLOGY  No Radiology Exams Resulted Within Past 24 Hours    Ordered the above noted radiological studies. Reviewed by me in PACS.          PROCEDURES  Procedures        MEDICATIONS GIVEN IN  ER  Medications   sodium chloride 0.9 % flush 10 mL (has no administration in time range)   sodium chloride 0.9 % infusion (has no administration in time range)   lactated ringers bolus 1,000 mL (0 mL Intravenous Stopped 10/16/23 1457)   ondansetron (ZOFRAN) injection 4 mg (4 mg Intravenous Given 10/16/23 1217)   ondansetron (ZOFRAN) injection 4 mg (4 mg Intravenous Given 10/16/23 1458)   aluminum-magnesium hydroxide-simethicone (MAALOX MAX) 400-400-40 MG/5ML suspension 15 mL (15 mL Oral Given 10/16/23 1457)           MEDICAL DECISION MAKING, PROGRESS, and CONSULTS    All labs have been independently reviewed by me.  All radiology studies have been reviewed by me and I have also reviewed the radiology report.   EKG's independently viewed and interpreted by me.  Discussion below represents my analysis of pertinent findings related to patient's condition, differential diagnosis, treatment plan and final disposition.      Additional sources:    - External (non-ED) record review: I reviewed previous hospitalization admit note from March 7, 2023 when she was admitted at [facility] for a vaginal delivery of her baby.    - Chronic or social conditions impacting care: Patient has 3 younger children at home that she is trying to care for as well.  Her pregnancy symptoms have made it difficult for her to keep up with those expectations of herself.          Orders placed during this visit:  Orders Placed This Encounter   Procedures    Comprehensive Metabolic Panel    Lactic Acid, Plasma    Urinalysis With Microscopic If Indicated (No Culture) - Urine, Clean Catch    hCG, Quantitative, Pregnancy    Lipase    CBC Auto Differential    Urinalysis, Microscopic Only - Urine, Clean Catch    Insert Peripheral IV    CBC & Differential           Differential diagnosis includes but is not limited to:    Hyperemesis gravidarum, dehydration, UTI, bowel obstruction, hypoglycemia      Independent interpretation of labs, radiology studies, and  discussions with consultants:  ED Course as of 10/16/23 1516   Mon Oct 16, 2023   1346 HCG Quantitative: 78,653.00 [PADMAJA]   1406 I just reassessed the patient.  She is resting in the bed.  She has not had any further vomiting but she does not seem to be feeling any better so far either. [PADMAJA]   1508 Ketones, UA(!): 80 mg/dL (3+) [PADMAJA]   1515 I just reassessed the patient.  She says she is resting little more comfortably now but still does not feel confident about going home and being able to drink fluids or eat enough foods based on how she feels.  Therefore I recommended that we keep her in the observation unit for further IV therapies tonight.  She is agreeable with this plan. [PADMAJA]   1515 I discussed with Sharri Bain in the observation unit.  She agrees to accept the patient for further medical management today. [PADMAJA]      ED Course User Index  [PADMAJA] Rickie Paredes MD         DIAGNOSIS  Final diagnoses:   Hyperemesis gravidarum   Hypokalemia         DISPOSITION  To observation unit        Latest Documented Vital Signs:  As of 15:16 EDT  BP- 124/79 HR- 61 Temp- 98.3 °F (36.8 °C) (Tympanic) O2 sat- 100%              --    Please note that portions of this were completed with a voice recognition program.       Note Disclaimer: At [facility], we believe that sharing information builds trust and better relationships. You are receiving this note because you are receiving care at [facility] or recently visited. It is possible you will see health information before a provider has talked with you about it. This kind of information can be easy to misunderstand. To help you fully understand what it means for your health, we urge you to discuss this note with your provider.             Rickie Paredes MD  10/16/23 1511

## 2023-10-16 NOTE — PLAN OF CARE
Goal Outcome Evaluation:              Outcome Evaluation: patient is a 27 year old female who admitted to the obsdervation unit r/t nausea, vomiting and generalize weakness, patient is currently pregnant and has a doctors appt this coming week, potassium 3.2 and  D5 1/2 with K+20 at 100ml/hr ordered, alert and oriented and able to verbalize needs c/o nausea and medication provided, VSS and pt had no questions prior to discharge.

## 2023-10-17 VITALS
WEIGHT: 186 LBS | TEMPERATURE: 98.3 F | HEART RATE: 69 BPM | HEIGHT: 69 IN | DIASTOLIC BLOOD PRESSURE: 70 MMHG | OXYGEN SATURATION: 100 % | RESPIRATION RATE: 16 BRPM | SYSTOLIC BLOOD PRESSURE: 113 MMHG | BODY MASS INDEX: 27.55 KG/M2

## 2023-10-17 LAB
ANION GAP SERPL CALCULATED.3IONS-SCNC: 9.9 MMOL/L (ref 5–15)
BUN SERPL-MCNC: 4 MG/DL (ref 6–20)
BUN/CREAT SERPL: 9.8 (ref 7–25)
CALCIUM SPEC-SCNC: 8.4 MG/DL (ref 8.6–10.5)
CHLORIDE SERPL-SCNC: 103 MMOL/L (ref 98–107)
CO2 SERPL-SCNC: 20.1 MMOL/L (ref 22–29)
CREAT SERPL-MCNC: 0.41 MG/DL (ref 0.57–1)
DEPRECATED RDW RBC AUTO: 40.2 FL (ref 37–54)
EGFRCR SERPLBLD CKD-EPI 2021: 136.8 ML/MIN/1.73
ERYTHROCYTE [DISTWIDTH] IN BLOOD BY AUTOMATED COUNT: 13.8 % (ref 12.3–15.4)
GLUCOSE SERPL-MCNC: 103 MG/DL (ref 65–99)
HCT VFR BLD AUTO: 32.7 % (ref 34–46.6)
HGB BLD-MCNC: 10.7 G/DL (ref 12–15.9)
MCH RBC QN AUTO: 26.5 PG (ref 26.6–33)
MCHC RBC AUTO-ENTMCNC: 32.7 G/DL (ref 31.5–35.7)
MCV RBC AUTO: 80.9 FL (ref 79–97)
PLATELET # BLD AUTO: 227 10*3/MM3 (ref 140–450)
PMV BLD AUTO: 10.3 FL (ref 6–12)
POTASSIUM SERPL-SCNC: 3.3 MMOL/L (ref 3.5–5.2)
RBC # BLD AUTO: 4.04 10*6/MM3 (ref 3.77–5.28)
SODIUM SERPL-SCNC: 133 MMOL/L (ref 136–145)
WBC NRBC COR # BLD: 4.57 10*3/MM3 (ref 3.4–10.8)

## 2023-10-17 PROCEDURE — G0378 HOSPITAL OBSERVATION PER HR: HCPCS

## 2023-10-17 PROCEDURE — 96376 TX/PRO/DX INJ SAME DRUG ADON: CPT

## 2023-10-17 PROCEDURE — 25810000003 DEXTROSE 5% IN LACTATED RINGERS PER 1000 ML: Performed by: PHYSICIAN ASSISTANT

## 2023-10-17 PROCEDURE — 96361 HYDRATE IV INFUSION ADD-ON: CPT

## 2023-10-17 PROCEDURE — 80048 BASIC METABOLIC PNL TOTAL CA: CPT | Performed by: PHYSICIAN ASSISTANT

## 2023-10-17 PROCEDURE — 25010000002 ONDANSETRON PER 1 MG: Performed by: PHYSICIAN ASSISTANT

## 2023-10-17 PROCEDURE — 85027 COMPLETE CBC AUTOMATED: CPT | Performed by: PHYSICIAN ASSISTANT

## 2023-10-17 RX ORDER — ONDANSETRON 8 MG/1
8 TABLET, ORALLY DISINTEGRATING ORAL EVERY 8 HOURS PRN
Qty: 30 TABLET | Refills: 0 | Status: SHIPPED | OUTPATIENT
Start: 2023-10-17

## 2023-10-17 RX ORDER — DOXYLAMINE SUCCINATE AND PYRIDOXINE HYDROCHLORIDE 20; 20 MG/1; MG/1
1 TABLET, EXTENDED RELEASE ORAL NIGHTLY
Qty: 60 TABLET | Refills: 0 | Status: SHIPPED | OUTPATIENT
Start: 2023-10-17

## 2023-10-17 RX ORDER — POTASSIUM CHLORIDE 1.5 G/1.58G
40 POWDER, FOR SOLUTION ORAL ONCE
Status: DISCONTINUED | OUTPATIENT
Start: 2023-10-17 | End: 2023-10-17

## 2023-10-17 RX ORDER — POTASSIUM CHLORIDE 750 MG/1
40 TABLET, FILM COATED, EXTENDED RELEASE ORAL ONCE
Status: COMPLETED | OUTPATIENT
Start: 2023-10-17 | End: 2023-10-17

## 2023-10-17 RX ADMIN — SODIUM CHLORIDE, SODIUM LACTATE, POTASSIUM CHLORIDE, CALCIUM CHLORIDE AND DEXTROSE MONOHYDRATE 100 ML/HR: 5; 600; 310; 30; 20 INJECTION, SOLUTION INTRAVENOUS at 12:24

## 2023-10-17 RX ADMIN — ONDANSETRON 4 MG: 2 INJECTION INTRAMUSCULAR; INTRAVENOUS at 09:37

## 2023-10-17 RX ADMIN — POTASSIUM CHLORIDE 40 MEQ: 750 TABLET, EXTENDED RELEASE ORAL at 12:20

## 2023-10-17 NOTE — DISCHARGE SUMMARY
.ED OBSERVATION PROGRESS/DISCHARGE SUMMARY    Date of Admission: 10/16/2023   LOS: 0 days   PCP: Provider, No Known      Subjective   Resting comfortably throughout the night and in no acute distress.  Patient has been tolerating p.o. today, no vomiting.    Hospital Outcome:   27-year-old female was seen and examined at bedside following admission to the observation unit secondary to hyperemesis gravidarum.  Laboratory evaluation shows hypokalemia with potassium 3.2 that is being replaced.  CBC unremarkable and lactate 1.2.  Patient is a currently 7 weeks pregnant and for the past 2 weeks she been struggling with nausea and vomiting.  Denies abdominal pain, cramping or vaginal bleeding/spotting.    Patient has been tolerating p.o. today and was ambulated around nursing unit without issue.  Patient is feeling improved and is ready to go home.  She was encouraged to follow-up with her OB/GYN this week.  In the interim, prescription for ODT Zofran as well as Bonjesta (Diclegis/pyridoxine+doxylamine) sent to patient's pharmacy.  Patient was instructed to take 1 dose of Bonjesta this evening.  If she is still nauseous tomorrow, start taking 1 tab in the morning and evening.  Follow-up with OB/GYN.  Usual return to ER precautions advised, patient expresses understanding and is in agreement with plan.    ROS:  General: no fevers, chills  Respiratory: no cough, dyspnea  Cardiovascular: no chest pain, palpitations  Abdomen: No abdominal pain, nausea, vomiting, or diarrhea  Neurologic: No focal weakness    Objective   Physical Exam:  I have reviewed the vital signs.  Temp:  [98.3 °F (36.8 °C)-98.9 °F (37.2 °C)] 98.9 °F (37.2 °C)  Heart Rate:  [] 73  Resp:  [18] 18  BP: ()/(67-79) 123/72  General Appearance:    Alert, cooperative, no distress  Head:    Normocephalic, atraumatic  Eyes:    Sclerae anicteric  Neck:   Supple, no mass  Lungs: Clear to auscultation bilaterally, respirations unlabored  Heart: Regular rate  and rhythm, S1 and S2 normal, no murmur, rub or gallop  Abdomen:  Soft, non-tender, bowel sounds active, nondistended  Extremities: No clubbing, cyanosis, or edema to lower extremities  Pulses:  2+ and symmetric in distal lower extremities  Skin: No rashes   Neurologic: Oriented x3, Normal strength to extremities    Results Review:    I have reviewed the labs, radiology results and diagnostic studies.    Results from last 7 days   Lab Units 10/16/23  1221   WBC 10*3/mm3 5.62   HEMOGLOBIN g/dL 12.4   HEMATOCRIT % 38.6   PLATELETS 10*3/mm3 293     Results from last 7 days   Lab Units 10/16/23  1221   SODIUM mmol/L 136   POTASSIUM mmol/L 3.2*   CHLORIDE mmol/L 100   CO2 mmol/L 22.0   BUN mg/dL 6   CREATININE mg/dL 0.60   CALCIUM mg/dL 10.1   BILIRUBIN mg/dL 0.6   ALK PHOS U/L 87   ALT (SGPT) U/L 7   AST (SGOT) U/L 8   GLUCOSE mg/dL 97     Imaging Results (Last 24 Hours)       ** No results found for the last 24 hours. **            I have reviewed the medications.  ---------------------------------------------------------------------------------------------  Assessment & Plan   Assessment/Problem List    Hyperemesis gravidarum      Plan:    Nausea and vomiting in pregnancy first trimester  -, approximately 7 weeks by dates  -Patient reports history of hyperemesis gravidarum in previous pregnancies  -IV fluids, D5 normal saline with 20mEq KCl to start, followed by D5 lactated Ringer's  -Clear liquid diet, advance as tolerated  -Antiemetics  -Continue to monitor  -Patient tolerating regular diet p.o. today  -Zofran and Bonjesta (Diclegis/pyridoxine+doxylamine) prescription sent to patient's pharmacy  -Follow-up outpatient with OB/GYN     Hypokalemia  -Potassium 3.2  -We will give IV fluids with 20 mEq Kcl  -Repeat potassium 3.3 this morning  -Replaced p.o.    Disposition: Home    Follow-up after Discharge: PCP, OB/GYN    This note will serve as a discharge summary    STEPHEN Rice 10/17/23 08:00 EDT    I have  worn appropriate PPE during this patient encounter, sanitized my hands both with entering and exiting patient's room.      33 minutes has been spent by [facility] providers in the care of this patient while under observation status

## 2023-10-17 NOTE — PROGRESS NOTES
MD Attestation Note    I supervised care provided by the midlevel provider.    The ANSLEY and I have discussed this patient's history, physical exam, and treatment plan. I have reviewed the documentation and personally had a face to face interaction with the patient  I affirm the documentation and agree with the treatment and plan.       My personal findings are:        Subjective:  Patient is a -0-0-3 at approximately 7 weeks gestation presenting with nausea and vomiting.  Ongoing for several days.  Currently improved after medications.  Patient is tolerating p.o.  History of hyperemesis in previous pregnancies.        Objective:  Bedside ultrasound demonstrating fetal pole with cardiac activity.  Intrauterine pregnancy.     Latest Reference Range & Units 10/16/23 12:21 10/17/23 04:34   Sodium 136 - 145 mmol/L 136 133 (L)   Potassium 3.5 - 5.2 mmol/L 3.2 (L) 3.3 (L)   Chloride 98 - 107 mmol/L 100 103   CO2 22.0 - 29.0 mmol/L 22.0 20.1 (L)   Anion Gap 5.0 - 15.0 mmol/L 14.0 9.9   BUN 6 - 20 mg/dL 6 4 (L)   Creatinine 0.57 - 1.00 mg/dL 0.60 0.41 (L)   BUN/Creatinine Ratio 7.0 - 25.0  10.0 9.8   eGFR >60.0 mL/min/1.73 124.8 136.8   Glucose 65 - 99 mg/dL 97 103 (H)   Calcium 8.6 - 10.5 mg/dL 10.1 8.4 (L)   (L): Data is abnormally low  (H): Data is abnormally high    PHYSICAL EXAM  Vitals:    10/16/23 2356 10/17/23 0429 10/17/23 0800 10/17/23 1135   BP: 123/72 117/70 115/74 113/70   BP Location: Right arm Right arm Right arm Left arm   Patient Position: Sitting Lying Lying Lying   Pulse:   64 69   Resp: 18 18 17 16   Temp: 98.9 °F (37.2 °C) 99.1 °F (37.3 °C) 98.4 °F (36.9 °C) 98.3 °F (36.8 °C)   TempSrc: Oral Oral Oral Oral   SpO2: 100% 100% 98% 100%   Weight:       Height:             GENERAL: no acute distress  HENT: nares patent  EYES: no scleral icterus  CV: regular rhythm, normal rate  RESPIRATORY: normal effort  ABDOMEN: soft  MUSCULOSKELETAL: no deformity  NEURO: alert, moves all extremities, follows  commands  PSYCH:  calm, cooperative  SKIN: warm, dry    Vital signs and nursing notes reviewed.      Assessment/ Plan:  Patient presenting with nausea and vomiting in pregnancy, otherwise well-appearing, vitals otherwise stable.  Tolerating p.o. today.  Replacing electrolytes.  Plan for symptomatic treatment and discharge home.

## 2023-10-17 NOTE — PLAN OF CARE
Goal Outcome Evaluation:        Pt left observation unit at this time via private vehicle with all her belongings, discharge summary provided, and education included,aware to follow up with OB provider as indicated, aware to  medication at the pharmacy of choice, IV removed and had no further questions.

## 2023-10-17 NOTE — PLAN OF CARE
Goal Outcome Evaluation:         Pt admitted for hyperemesis gravidarum. Potassium replacement initiated. Pt reports N/V has improved some. Pt was able to eat a few crackers and tolerated well. No vomiting throughout the night.

## 2023-10-17 NOTE — NURSING NOTE
"Patient ambulated around unit and well tolerated. Pt states \" I feel much better than I did yesterday.  "

## 2023-10-17 NOTE — DISCHARGE INSTRUCTIONS
Follow up with your OB/GYN, call this week to try to schedule sooner appointment.      Begin Bonjesta (same as Diclegis, a combination of doxylamine/Unisom and pyridoxine/vitamin B6).  Take one tablet tonight at bedtime.  If you still have nausea tomorrow, take one tablet in the morning and 1 tablet at night.      Zofran as needed.    Return to the emergency department with worsening symptoms, uncontrolled pain, inability to tolerate oral liquids, fever greater than 101°F not controlled by Tylenol or as needed with emergent concerns.

## 2023-11-03 ENCOUNTER — HOSPITAL ENCOUNTER (INPATIENT)
Facility: HOSPITAL | Age: 28
LOS: 12 days | Discharge: HOME | End: 2023-11-15
Attending: EMERGENCY MEDICINE | Admitting: OBSTETRICS & GYNECOLOGY
Payer: COMMERCIAL

## 2023-11-03 DIAGNOSIS — O21.0 HYPEREMESIS GRAVIDARUM: Primary | ICD-10-CM

## 2023-11-03 LAB
ALBUMIN SERPL-MCNC: 4.2 G/DL (ref 3.5–5.2)
ALBUMIN/GLOB SERPL: 1.1 G/DL
ALP SERPL-CCNC: 79 U/L (ref 39–117)
ALT SERPL W P-5'-P-CCNC: 12 U/L (ref 1–33)
AMPHET+METHAMPHET UR QL: NEGATIVE
ANION GAP SERPL CALCULATED.3IONS-SCNC: 17.6 MMOL/L (ref 5–15)
AST SERPL-CCNC: 29 U/L (ref 1–32)
BACTERIA UR QL AUTO: ABNORMAL /HPF
BARBITURATES UR QL SCN: NEGATIVE
BASOPHILS # BLD AUTO: 0.01 10*3/MM3 (ref 0–0.2)
BASOPHILS NFR BLD AUTO: 0.2 % (ref 0–1.5)
BENZODIAZ UR QL SCN: NEGATIVE
BILIRUB SERPL-MCNC: 0.4 MG/DL (ref 0–1.2)
BILIRUB UR QL STRIP: NEGATIVE
BUN SERPL-MCNC: 4 MG/DL (ref 6–20)
BUN/CREAT SERPL: 8.5 (ref 7–25)
CALCIUM SPEC-SCNC: 9.8 MG/DL (ref 8.6–10.5)
CANNABINOIDS SERPL QL: NEGATIVE
CHLORIDE SERPL-SCNC: 98 MMOL/L (ref 98–107)
CLARITY UR: CLEAR
CO2 SERPL-SCNC: 16.4 MMOL/L (ref 22–29)
COCAINE UR QL: NEGATIVE
COLOR UR: YELLOW
CREAT SERPL-MCNC: 0.47 MG/DL (ref 0.57–1)
DEPRECATED RDW RBC AUTO: 40.6 FL (ref 37–54)
EGFRCR SERPLBLD CKD-EPI 2021: 131.5 ML/MIN/1.73
EOSINOPHIL # BLD AUTO: 0.04 10*3/MM3 (ref 0–0.4)
EOSINOPHIL NFR BLD AUTO: 0.8 % (ref 0.3–6.2)
ERYTHROCYTE [DISTWIDTH] IN BLOOD BY AUTOMATED COUNT: 14 % (ref 12.3–15.4)
FENTANYL UR-MCNC: NEGATIVE NG/ML
GLOBULIN UR ELPH-MCNC: 3.7 GM/DL
GLUCOSE SERPL-MCNC: 98 MG/DL (ref 65–99)
GLUCOSE UR STRIP-MCNC: NEGATIVE MG/DL
HCG INTACT+B SERPL-ACNC: NORMAL MIU/ML
HCT VFR BLD AUTO: 41.3 % (ref 34–46.6)
HGB BLD-MCNC: 13.5 G/DL (ref 12–15.9)
HGB UR QL STRIP.AUTO: NEGATIVE
HYALINE CASTS UR QL AUTO: ABNORMAL /LPF
IMM GRANULOCYTES # BLD AUTO: 0.01 10*3/MM3 (ref 0–0.05)
IMM GRANULOCYTES NFR BLD AUTO: 0.2 % (ref 0–0.5)
KETONES UR QL STRIP: ABNORMAL
LEUKOCYTE ESTERASE UR QL STRIP.AUTO: NEGATIVE
LIPASE SERPL-CCNC: 31 U/L (ref 13–60)
LYMPHOCYTES # BLD AUTO: 1.22 10*3/MM3 (ref 0.7–3.1)
LYMPHOCYTES NFR BLD AUTO: 24.4 % (ref 19.6–45.3)
MCH RBC QN AUTO: 26 PG (ref 26.6–33)
MCHC RBC AUTO-ENTMCNC: 32.7 G/DL (ref 31.5–35.7)
MCV RBC AUTO: 79.6 FL (ref 79–97)
METHADONE UR QL SCN: NEGATIVE
MONOCYTES # BLD AUTO: 0.41 10*3/MM3 (ref 0.1–0.9)
MONOCYTES NFR BLD AUTO: 8.2 % (ref 5–12)
NEUTROPHILS NFR BLD AUTO: 3.32 10*3/MM3 (ref 1.7–7)
NEUTROPHILS NFR BLD AUTO: 66.2 % (ref 42.7–76)
NITRITE UR QL STRIP: NEGATIVE
NRBC BLD AUTO-RTO: 0 /100 WBC (ref 0–0.2)
OPIATES UR QL: NEGATIVE
OXYCODONE UR QL SCN: NEGATIVE
PH UR STRIP.AUTO: 6 [PH] (ref 5–8)
PLATELET # BLD AUTO: 248 10*3/MM3 (ref 140–450)
PMV BLD AUTO: 10.8 FL (ref 6–12)
POTASSIUM SERPL-SCNC: 4.2 MMOL/L (ref 3.5–5.2)
PROT SERPL-MCNC: 7.9 G/DL (ref 6–8.5)
PROT UR QL STRIP: ABNORMAL
RBC # BLD AUTO: 5.19 10*6/MM3 (ref 3.77–5.28)
RBC # UR STRIP: ABNORMAL /HPF
REF LAB TEST METHOD: ABNORMAL
SODIUM SERPL-SCNC: 132 MMOL/L (ref 136–145)
SP GR UR STRIP: 1.02 (ref 1–1.03)
SQUAMOUS #/AREA URNS HPF: ABNORMAL /HPF
UROBILINOGEN UR QL STRIP: ABNORMAL
WBC # UR STRIP: ABNORMAL /HPF
WBC NRBC COR # BLD: 5.01 10*3/MM3 (ref 3.4–10.8)

## 2023-11-03 PROCEDURE — 84702 CHORIONIC GONADOTROPIN TEST: CPT | Performed by: NURSE PRACTITIONER

## 2023-11-03 PROCEDURE — 80307 DRUG TEST PRSMV CHEM ANLYZR: CPT

## 2023-11-03 PROCEDURE — 85025 COMPLETE CBC W/AUTO DIFF WBC: CPT | Performed by: PHYSICIAN ASSISTANT

## 2023-11-03 PROCEDURE — 25810000003 SODIUM CHLORIDE 0.9 % SOLUTION: Performed by: PHYSICIAN ASSISTANT

## 2023-11-03 PROCEDURE — 99285 EMERGENCY DEPT VISIT HI MDM: CPT

## 2023-11-03 PROCEDURE — 80053 COMPREHEN METABOLIC PANEL: CPT | Performed by: PHYSICIAN ASSISTANT

## 2023-11-03 PROCEDURE — 81001 URINALYSIS AUTO W/SCOPE: CPT | Performed by: PHYSICIAN ASSISTANT

## 2023-11-03 PROCEDURE — 25010000002 ONDANSETRON PER 1 MG: Performed by: NURSE PRACTITIONER

## 2023-11-03 PROCEDURE — G0378 HOSPITAL OBSERVATION PER HR: HCPCS

## 2023-11-03 PROCEDURE — 83690 ASSAY OF LIPASE: CPT | Performed by: PHYSICIAN ASSISTANT

## 2023-11-03 PROCEDURE — 25010000002 ONDANSETRON PER 1 MG: Performed by: PHYSICIAN ASSISTANT

## 2023-11-03 RX ORDER — ONDANSETRON 2 MG/ML
4 INJECTION INTRAMUSCULAR; INTRAVENOUS EVERY 6 HOURS PRN
Status: DISCONTINUED | OUTPATIENT
Start: 2023-11-03 | End: 2023-11-04

## 2023-11-03 RX ORDER — DEXTROSE AND SODIUM CHLORIDE 5; .45 G/100ML; G/100ML
100 INJECTION, SOLUTION INTRAVENOUS CONTINUOUS
Status: DISCONTINUED | OUTPATIENT
Start: 2023-11-03 | End: 2023-11-04

## 2023-11-03 RX ORDER — SODIUM CHLORIDE 0.9 % (FLUSH) 0.9 %
10 SYRINGE (ML) INJECTION EVERY 12 HOURS SCHEDULED
Status: DISCONTINUED | OUTPATIENT
Start: 2023-11-03 | End: 2023-11-15 | Stop reason: HOSPADM

## 2023-11-03 RX ORDER — SODIUM CHLORIDE 0.9 % (FLUSH) 0.9 %
10 SYRINGE (ML) INJECTION AS NEEDED
Status: DISCONTINUED | OUTPATIENT
Start: 2023-11-03 | End: 2023-11-15 | Stop reason: HOSPADM

## 2023-11-03 RX ORDER — ONDANSETRON 2 MG/ML
4 INJECTION INTRAMUSCULAR; INTRAVENOUS ONCE
Status: COMPLETED | OUTPATIENT
Start: 2023-11-03 | End: 2023-11-03

## 2023-11-03 RX ORDER — SODIUM CHLORIDE 9 MG/ML
40 INJECTION, SOLUTION INTRAVENOUS AS NEEDED
Status: DISCONTINUED | OUTPATIENT
Start: 2023-11-03 | End: 2023-11-15 | Stop reason: HOSPADM

## 2023-11-03 RX ORDER — ONDANSETRON 4 MG/1
4 TABLET, FILM COATED ORAL EVERY 6 HOURS PRN
Status: DISCONTINUED | OUTPATIENT
Start: 2023-11-03 | End: 2023-11-04

## 2023-11-03 RX ORDER — FAMOTIDINE 10 MG/ML
20 INJECTION, SOLUTION INTRAVENOUS ONCE
Status: COMPLETED | OUTPATIENT
Start: 2023-11-03 | End: 2023-11-03

## 2023-11-03 RX ADMIN — DEXTROSE AND SODIUM CHLORIDE 100 ML/HR: 5; 450 INJECTION, SOLUTION INTRAVENOUS at 11:20

## 2023-11-03 RX ADMIN — Medication 10 ML: at 12:54

## 2023-11-03 RX ADMIN — ONDANSETRON 4 MG: 2 INJECTION INTRAMUSCULAR; INTRAVENOUS at 17:26

## 2023-11-03 RX ADMIN — SODIUM CHLORIDE 1000 ML: 9 INJECTION, SOLUTION INTRAVENOUS at 08:08

## 2023-11-03 RX ADMIN — ONDANSETRON 4 MG: 2 INJECTION INTRAMUSCULAR; INTRAVENOUS at 08:00

## 2023-11-03 RX ADMIN — ONDANSETRON 4 MG: 2 INJECTION INTRAMUSCULAR; INTRAVENOUS at 22:54

## 2023-11-03 RX ADMIN — ONDANSETRON 4 MG: 2 INJECTION INTRAMUSCULAR; INTRAVENOUS at 09:19

## 2023-11-03 RX ADMIN — DEXTROSE AND SODIUM CHLORIDE 100 ML/HR: 5; 450 INJECTION, SOLUTION INTRAVENOUS at 21:34

## 2023-11-03 RX ADMIN — DOXYLAMINE SUCCINATE: 25 TABLET ORAL at 13:33

## 2023-11-03 RX ADMIN — Medication 10 ML: at 22:54

## 2023-11-03 RX ADMIN — FAMOTIDINE 20 MG: 10 INJECTION INTRAVENOUS at 09:19

## 2023-11-03 NOTE — ED PROVIDER NOTES
EMERGENCY DEPARTMENT ENCOUNTER    Room Number:  115/1  Date of encounter:  11/3/2023  PCP: Provider, No Known  Historian: Patient  Chronic or social conditions impacting care (social determinants of health): Nothing    HPI:  Chief Complaint: Vomiting, pregnant  A complete HPI/ROS/PMH/PSH/SH/FH are unobtainable due to: Nothing    Context: Osbaldo Mayo is a 28 y.o. female who presents to the ED c/o vomiting, nausea since last night.  Patient reports that she is 10 weeks pregnant.  She does have a history of previous episodes of hyperemesis gravidarum with previous pregnancy.  She did have an admission approximately 3 weeks ago for hyperemesis gravidarum.  She states since then she has done fairly well.  She has been eating and drinking without difficulty without taking medications.  Starting last night she became nauseated and was unable to keep any food or fluids down.  She has taken Zofran and Diclegis without improvement.  She denies any significant abdominal pain, fever, diarrhea.  She denies any dysuria, vaginal bleeding or discharge.  She has not seen her OB doctor.  She does have an appointment later this month.    Review of prior external notes (non-ED):   I reviewed admission from 10/16/2023.  Patient admitted for hyperemesis gravidarum, hypokalemia.    Review of prior external test results outside of this encounter:  I reviewed a CMP from 10/16/2023.  Potassium 3.2, creatinine 0.6    PAST MEDICAL HISTORY  Active Ambulatory Problems     Diagnosis Date Noted    Hyperemesis gravidarum 10/16/2023     Resolved Ambulatory Problems     Diagnosis Date Noted    No Resolved Ambulatory Problems     Past Medical History:   Diagnosis Date    Anemia     Hypoglycemia          PAST SURGICAL HISTORY  History reviewed. No pertinent surgical history.      FAMILY HISTORY  Family History   Problem Relation Age of Onset    Hypertension Mother     Diabetes Mother     No Known Problems Father     Asthma Sister     Asthma Brother      No Known Problems Maternal Aunt     No Known Problems Maternal Uncle     No Known Problems Paternal Aunt     No Known Problems Paternal Uncle     No Known Problems Maternal Grandmother     Cancer Maternal Grandfather     No Known Problems Paternal Grandmother     No Known Problems Paternal Grandfather     Cancer Other         Breat    Diabetes Other     Hypertension Other     Anesthesia problems Neg Hx     Broken bones Neg Hx     Clotting disorder Neg Hx     Collagen disease Neg Hx     Dislocations Neg Hx     Osteoporosis Neg Hx     Rheumatologic disease Neg Hx     Scoliosis Neg Hx     Severe sprains Neg Hx          SOCIAL HISTORY  Social History     Socioeconomic History    Marital status: Single   Tobacco Use    Smoking status: Former     Types: Cigars     Start date:      Quit date: 2023     Years since quittin.1    Smokeless tobacco: Never    Tobacco comments:     Smoke 2 cigars a day.   Vaping Use    Vaping Use: Never used   Substance and Sexual Activity    Alcohol use: Not Currently    Drug use: Not Currently    Sexual activity: Defer         ALLERGIES  Patient has no known allergies.        REVIEW OF SYSTEMS  All systems reviewed and negative except for those discussed in HPI.       PHYSICAL EXAM    I have reviewed the triage vital signs and nursing notes.    ED Triage Vitals   Temp Heart Rate Resp BP SpO2   23 0731 23 0731 23 0731 23 0733 23 0731   97.5 °F (36.4 °C) (!) 150 16 (!) 146/114 95 %      Temp src Heart Rate Source Patient Position BP Location FiO2 (%)   -- -- -- -- --              Physical Exam  GENERAL: Alert, oriented, mildly ill-appearing   HENT: Dry mucous membranes  EYES: no scleral icterus, EOMI  CV: regular rhythm, tachycardic rate, no murmur  RESPIRATORY: normal effort, CTA  ABDOMEN: soft, nontender  MUSCULOSKELETAL: no deformity, FROM, no calf swelling or tenderness  NEURO: alert, moves all extremities, follows commands  SKIN: warm,  dry        LAB RESULTS  Recent Results (from the past 24 hour(s))   Comprehensive Metabolic Panel    Collection Time: 11/03/23  8:01 AM    Specimen: Blood   Result Value Ref Range    Glucose 98 65 - 99 mg/dL    BUN 4 (L) 6 - 20 mg/dL    Creatinine 0.47 (L) 0.57 - 1.00 mg/dL    Sodium 132 (L) 136 - 145 mmol/L    Potassium 4.2 3.5 - 5.2 mmol/L    Chloride 98 98 - 107 mmol/L    CO2 16.4 (L) 22.0 - 29.0 mmol/L    Calcium 9.8 8.6 - 10.5 mg/dL    Total Protein 7.9 6.0 - 8.5 g/dL    Albumin 4.2 3.5 - 5.2 g/dL    ALT (SGPT) 12 1 - 33 U/L    AST (SGOT) 29 1 - 32 U/L    Alkaline Phosphatase 79 39 - 117 U/L    Total Bilirubin 0.4 0.0 - 1.2 mg/dL    Globulin 3.7 gm/dL    A/G Ratio 1.1 g/dL    BUN/Creatinine Ratio 8.5 7.0 - 25.0    Anion Gap 17.6 (H) 5.0 - 15.0 mmol/L    eGFR 131.5 >60.0 mL/min/1.73   Lipase    Collection Time: 11/03/23  8:01 AM    Specimen: Blood   Result Value Ref Range    Lipase 31 13 - 60 U/L   CBC Auto Differential    Collection Time: 11/03/23  8:01 AM    Specimen: Blood   Result Value Ref Range    WBC 5.01 3.40 - 10.80 10*3/mm3    RBC 5.19 3.77 - 5.28 10*6/mm3    Hemoglobin 13.5 12.0 - 15.9 g/dL    Hematocrit 41.3 34.0 - 46.6 %    MCV 79.6 79.0 - 97.0 fL    MCH 26.0 (L) 26.6 - 33.0 pg    MCHC 32.7 31.5 - 35.7 g/dL    RDW 14.0 12.3 - 15.4 %    RDW-SD 40.6 37.0 - 54.0 fl    MPV 10.8 6.0 - 12.0 fL    Platelets 248 140 - 450 10*3/mm3    Neutrophil % 66.2 42.7 - 76.0 %    Lymphocyte % 24.4 19.6 - 45.3 %    Monocyte % 8.2 5.0 - 12.0 %    Eosinophil % 0.8 0.3 - 6.2 %    Basophil % 0.2 0.0 - 1.5 %    Immature Grans % 0.2 0.0 - 0.5 %    Neutrophils, Absolute 3.32 1.70 - 7.00 10*3/mm3    Lymphocytes, Absolute 1.22 0.70 - 3.10 10*3/mm3    Monocytes, Absolute 0.41 0.10 - 0.90 10*3/mm3    Eosinophils, Absolute 0.04 0.00 - 0.40 10*3/mm3    Basophils, Absolute 0.01 0.00 - 0.20 10*3/mm3    Immature Grans, Absolute 0.01 0.00 - 0.05 10*3/mm3    nRBC 0.0 0.0 - 0.2 /100 WBC   Urinalysis With Microscopic If Indicated (No  Culture) - Urine, Clean Catch    Collection Time: 11/03/23  8:08 AM    Specimen: Urine, Clean Catch   Result Value Ref Range    Color, UA Yellow Yellow, Straw    Appearance, UA Clear Clear    pH, UA 6.0 5.0 - 8.0    Specific Gravity, UA 1.024 1.005 - 1.030    Glucose, UA Negative Negative    Ketones, UA 80 mg/dL (3+) (A) Negative    Bilirubin, UA Negative Negative    Blood, UA Negative Negative    Protein, UA 30 mg/dL (1+) (A) Negative    Leuk Esterase, UA Negative Negative    Nitrite, UA Negative Negative    Urobilinogen, UA 1.0 E.U./dL 0.2 - 1.0 E.U./dL   Urinalysis, Microscopic Only - Urine, Clean Catch    Collection Time: 11/03/23  8:08 AM    Specimen: Urine, Clean Catch   Result Value Ref Range    RBC, UA 0-2 None Seen, 0-2 /HPF    WBC, UA 3-5 (A) None Seen, 0-2 /HPF    Bacteria, UA None Seen None Seen /HPF    Squamous Epithelial Cells, UA 3-6 (A) None Seen, 0-2 /HPF    Hyaline Casts, UA 7-12 None Seen /LPF    Methodology Automated Microscopy    hCG, Quantitative, Pregnancy    Collection Time: 11/03/23 11:06 AM    Specimen: Blood   Result Value Ref Range    HCG Quantitative 83,259.00 mIU/mL       Ordered the above labs and independently reviewed the results.      MEDICATIONS GIVEN IN ER    Medications   sodium chloride 0.9 % flush 10 mL (10 mL Intravenous Given 11/3/23 1254)   sodium chloride 0.9 % flush 10 mL (has no administration in time range)   sodium chloride 0.9 % infusion 40 mL (has no administration in time range)   ondansetron (ZOFRAN) tablet 4 mg (has no administration in time range)     Or   ondansetron (ZOFRAN) injection 4 mg (has no administration in time range)   dextrose 5 % and sodium chloride 0.45 % infusion (100 mL/hr Intravenous Currently Infusing 11/3/23 1333)   doxylamine-pyridoxine 25-25 mg combo dose ( Oral Given 11/3/23 1333)   sodium chloride 0.9 % bolus 1,000 mL (0 mL Intravenous Stopped 11/3/23 1031)   ondansetron (ZOFRAN) injection 4 mg (4 mg Intravenous Given 11/3/23 0800)    ondansetron (ZOFRAN) injection 4 mg (4 mg Intravenous Given 11/3/23 0919)   famotidine (PEPCID) injection 20 mg (20 mg Intravenous Given 11/3/23 0919)         ADDITIONAL ORDERS CONSIDERED BUT NOT ORDERED:  Nothing      PROGRESS, DATA ANALYSIS, CONSULTS, AND MEDICAL DECISION MAKING    All labs have been independently interpreted by myself.  All radiology studies have been independently interpreted by myself and discussed with radiologist dictating the report.   EKG's independently interpreted by myself.  Discussion below represents my analysis of pertinent findings related to patient's condition, differential diagnosis, treatment plan and final disposition.    I have discussed case with Dr. Montelongo, emergency room physician.  He has performed his own bedside examination and agrees with treatment plan.    ED Course as of 11/03/23 1427 Fri Nov 03, 2023 0754 Patient presents with 2-day history of nausea, vomiting.  Patient is approximately 10 weeks pregnant.  She has had 1 prior admission for hyperemesis gravidarum.  No abdominal pain, fever, diarrhea.  Plan to check basic labs and administer fluids, antiemetics. [EE]   0816 WBC: 5.01 [EE]   0817 Hemoglobin: 13.5 [EE]   0823 Ketones, UA(!): 80 mg/dL (3+) [TD]   1046 OB Cecelia [EE]   1049 Recheck of patient.  She states she still feels pretty nauseated.  She does have ketones in her urine, and is acidotic.  I plan to place her in the observation unit.    I discussed the plan with Joselyn Willis, nurse practitioner in the abdomen.  She agrees to admit. [EE]      ED Course User Index  [EE] Jeffry Srivastava PA  [TD] Brady Montelongo II, MD       AS OF 14:27 EDT VITALS:    BP - 127/87  HR - 89  TEMP - 97.4 °F (36.3 °C) (Oral)  O2 SATS - 100%        DIAGNOSIS  Final diagnoses:   Hyperemesis gravidarum         DISPOSITION  Admitted      Dictated utilizing Dragon dictation     Jeffry Srivastava PA  11/03/23 1427

## 2023-11-03 NOTE — ED PROVIDER NOTES
MD ATTESTATION NOTE    The [facility] and I have discussed this patient's history, physical exam, and treatment plan.    I provided a substantive portion of the care of this patient. I personally performed the physical exam, in its entirety. The attached note describes my personal findings.      Osbaldo Mayo is a 28 y.o. female who presents to the ED c/o vomiting.  She is 10 weeks pregnant.  She states that she been using Zofran Diclegis at home and had been working well until last night.      On exam:  GENERAL: not distressed  HENT: nares patent, mucous membranes are tacky  EYES: no scleral icterus  CV: regular rhythm, regular rate  RESPIRATORY: normal effort  ABDOMEN: soft, nontender  MUSCULOSKELETAL: no deformity  NEURO: alert, moves all extremities, follows commands  SKIN: warm, dry    Labs  Recent Results (from the past 24 hour(s))   Lipase    Collection Time: 11/03/23  8:01 AM    Specimen: Blood   Result Value Ref Range    Lipase 31 13 - 60 U/L   CBC Auto Differential    Collection Time: 11/03/23  8:01 AM    Specimen: Blood   Result Value Ref Range    WBC 5.01 3.40 - 10.80 10*3/mm3    RBC 5.19 3.77 - 5.28 10*6/mm3    Hemoglobin 13.5 12.0 - 15.9 g/dL    Hematocrit 41.3 34.0 - 46.6 %    MCV 79.6 79.0 - 97.0 fL    MCH 26.0 (L) 26.6 - 33.0 pg    MCHC 32.7 31.5 - 35.7 g/dL    RDW 14.0 12.3 - 15.4 %    RDW-SD 40.6 37.0 - 54.0 fl    MPV 10.8 6.0 - 12.0 fL    Platelets 248 140 - 450 10*3/mm3    Neutrophil % 66.2 42.7 - 76.0 %    Lymphocyte % 24.4 19.6 - 45.3 %    Monocyte % 8.2 5.0 - 12.0 %    Eosinophil % 0.8 0.3 - 6.2 %    Basophil % 0.2 0.0 - 1.5 %    Immature Grans % 0.2 0.0 - 0.5 %    Neutrophils, Absolute 3.32 1.70 - 7.00 10*3/mm3    Lymphocytes, Absolute 1.22 0.70 - 3.10 10*3/mm3    Monocytes, Absolute 0.41 0.10 - 0.90 10*3/mm3    Eosinophils, Absolute 0.04 0.00 - 0.40 10*3/mm3    Basophils, Absolute 0.01 0.00 - 0.20 10*3/mm3    Immature Grans, Absolute 0.01 0.00 - 0.05 10*3/mm3    nRBC 0.0 0.0 - 0.2 /100 WBC    Urinalysis With Microscopic If Indicated (No Culture) - Urine, Clean Catch    Collection Time: 11/03/23  8:08 AM    Specimen: Urine, Clean Catch   Result Value Ref Range    Color, UA Yellow Yellow, Straw    Appearance, UA Clear Clear    pH, UA 6.0 5.0 - 8.0    Specific Gravity, UA 1.024 1.005 - 1.030    Glucose, UA Negative Negative    Ketones, UA 80 mg/dL (3+) (A) Negative    Bilirubin, UA Negative Negative    Blood, UA Negative Negative    Protein, UA 30 mg/dL (1+) (A) Negative    Leuk Esterase, UA Negative Negative    Nitrite, UA Negative Negative    Urobilinogen, UA 1.0 E.U./dL 0.2 - 1.0 E.U./dL   Urinalysis, Microscopic Only - Urine, Clean Catch    Collection Time: 11/03/23  8:08 AM    Specimen: Urine, Clean Catch   Result Value Ref Range    RBC, UA 0-2 None Seen, 0-2 /HPF    WBC, UA 3-5 (A) None Seen, 0-2 /HPF    Bacteria, UA None Seen None Seen /HPF    Squamous Epithelial Cells, UA 3-6 (A) None Seen, 0-2 /HPF    Hyaline Casts, UA 7-12 None Seen /LPF    Methodology Automated Microscopy        Radiology  No Radiology Exams Resulted Within Past 24 Hours    Medications given in the ED:  Medications   sodium chloride 0.9 % bolus 1,000 mL (1,000 mL Intravenous New Bag 11/3/23 0808)   ondansetron (ZOFRAN) injection 4 mg (4 mg Intravenous Given 11/3/23 0800)       Orders placed during this visit:  Orders Placed This Encounter   Procedures    Comprehensive Metabolic Panel    Lipase    Urinalysis With Microscopic If Indicated (No Culture) - Urine, Clean Catch    CBC Auto Differential    Urinalysis, Microscopic Only - Urine, Clean Catch    CBC & Differential       Medical Decision Making:  ED Course as of 11/05/23 1912 Fri Nov 03, 2023   9164 Patient presents with 2-day history of nausea, vomiting.  Patient is approximately 10 weeks pregnant.  She has had 1 prior admission for hyperemesis gravidarum.  No abdominal pain, fever, diarrhea.  Plan to check basic labs and administer fluids, antiemetics. [EE]   0816 WBC:  5.01 [EE]   0817 Hemoglobin: 13.5 [EE]   0823 Ketones, UA(!): 80 mg/dL (3+) [TD]   1046 OB Cecelia [EE]   1049 Recheck of patient.  She states she still feels pretty nauseated.  She does have ketones in her urine, and is acidotic.  I plan to place her in the observation unit.    I discussed the plan with Joselyn Willis, nurse practitioner in the abdomen.  She agrees to admit. [EE]      ED Course User Index  [EE] Jeffry Srivastava PA  [TD] Brady Montelongo II, MD           Diagnosis  Final diagnoses:   Hyperemesis gravidarum          Brady Montelongo II, MD  11/05/23 1912

## 2023-11-03 NOTE — H&P
[facility]   HISTORY AND PHYSICAL    Patient Name: Osbaldo Mayo  : 1995  MRN: [id number]  Primary Care Physician:  Provider, No Known  Date of admission: 11/3/2023    Subjective   Subjective     Chief Complaint:   Chief Complaint   Patient presents with    Vomiting During Pregnancy         HPI:    Osbaldo Mayo is a pleasant afebrile ambulatory 28 y.o. black female with a past medical history of hyperemesis gravidarum, anemia and hypoglycemia.    She presents to the emergency department at [facility] today with complaint of nausea and vomiting.  She has been admitted to the ED observation unit for further testing and evaluation.    Patient reports that she is currently approximately 10 weeks pregnant.  She states she follows with Dr. Cronin with [facility].  She advises that she has had nonbilious nonbloody emesis since approximately 2100 last evening.  She has taken Zofran and Diclegis which have worked with prior pregnancies but does not seem to be getting any relief.  She endorses some nausea but denies any abdominal pain, fever, chills, dysuria or urinary frequency.  She denies any vaginal bleeding or abnormal vaginal discharge.  States she has not had a prenatal appointment with her OB/GYN for this pregnancy yet.    OB History:   Para Term  AB Living   5 3 3 1 3     Review of Systems   All systems were reviewed and negative except for: What is mentioned above    Personal History     Past Medical History:   Diagnosis Date    Anemia     Hypoglycemia        No past surgical history on file.    Family History: family history includes Asthma in her brother and sister; Cancer in her maternal grandfather and another family member; Diabetes in her mother and another family member; Hypertension in her mother and another family member; No Known Problems in her father, maternal aunt, maternal grandmother, maternal uncle, paternal aunt, paternal grandfather, paternal  grandmother, and paternal uncle. Otherwise pertinent FHx was reviewed and not pertinent to current issue.    Social History:  reports that she quit smoking about 6 weeks ago. Her smoking use included cigars. She started smoking about 6 years ago. She has never used smokeless tobacco. She reports current alcohol use. She reports that she does not currently use drugs after having used the following drugs: Marijuana.    Home Medications:  doxylamine, doxylamine-pyridoxine ER, and ondansetron ODT    Allergies:  No Known Allergies    Objective   Objective     Vitals:   Temp:  [97.5 °F (36.4 °C)] 97.5 °F (36.4 °C)  Heart Rate:  [] 87  Resp:  [16] 16  BP: (112-146)/() 112/73  Physical Exam    Constitutional: Awake, alert   Eyes: PERRLA, sclerae anicteric, no conjunctival injection   HENT: NCAT, mucous membranes are dry and tacky   Neck: Supple, no thyromegaly, no lymphadenopathy, trachea midline   Respiratory: Clear to auscultation bilaterally, nonlabored respirations    Cardiovascular: RRR, no murmurs, rubs, or gallops, palpable pedal pulses bilaterally   Gastrointestinal: Positive bowel sounds, soft, nontender, nondistended   Musculoskeletal: No bilateral ankle edema, no clubbing or cyanosis to extremities   Psychiatric: Anxious affect, cooperative   Neurologic: Oriented x 3, strength symmetric in all extremities, Cranial Nerves grossly intact to confrontation, speech clear   Skin: No rashes     Result Review    Result Review:  I have personally reviewed the results from the time of this admission to 11/3/2023 11:06 EDT and agree with these findings:  [x]  Laboratory list / accordion  []  Microbiology  []  Radiology  []  EKG/Telemetry   []  Cardiology/Vascular   []  Pathology  []  Old records  []  Other:  Most notable findings include: Serum sodium 132, CO2 16.4, urinalysis positive for 80 ketones, protein, 3-5 WBCs, negative for bacteria, 3-6 squamous epithelials      Assessment & Plan   Assessment / Plan      Brief Patient Summary:  Osbaldo Mayo is a 28 y.o. female who is being evaluated for hyperemesis gravidarum    Active Hospital Problems:  Active Hospital Problems    Diagnosis     **Hyperemesis gravidarum      Plan:     Hyperemesis gravidarum  Consult to Ob-Gyn  Prn antiemetics with zofran and diclegis  D5 1/s NS @ 100mL/hr  Fetal heart tones      DVT prophylaxis:  Mechanical DVT prophylaxis orders are present.    CODE STATUS:    Level Of Support Discussed With: Patient  Code Status (Patient has no pulse and is not breathing): CPR (Attempt to Resuscitate)  Medical Interventions (Patient has pulse or is breathing): Full Support    Admission Status:  I believe this patient meets observation status.    Electronically signed by TIARA Patel, 11/03/23, 11:06 AM EDT.        78 minutes has been spent by [facility] providers in the care of this patient while under observation status      I have worn appropriate PPE during this patient encounter, sanitized my hands both with entering and exiting patient's room.

## 2023-11-03 NOTE — PLAN OF CARE
Goal Outcome Evaluation:  Plan of Care Reviewed With: patient        Progress: no change       VSS. Admitted to the obs unit with hyperemesis gravidarum. Pt unable to keep down even sips of meds upon arrival. Meds ordered and given and now pt is resting comfortably. OB consult called.

## 2023-11-03 NOTE — PROGRESS NOTES
Clinical Pharmacy Services: Medication History    Osbaldo Mayo is a 28 y.o. female presenting to [facility] for   Chief Complaint   Patient presents with    Vomiting During Pregnancy       She  has a past medical history of Anemia and Hypoglycemia.    Allergies as of 11/03/2023    (No Known Allergies)       Medication information was obtained from: Patient   Pharmacy and Phone Number:     Prior to Admission Medications       Prescriptions Last Dose Informant Patient Reported? Taking?    doxylamine (UNISOM) 25 MG tablet Past Week Self Yes Yes    Take 1 tablet by mouth At Night As Needed for Sleep.    doxylamine-pyridoxine ER (Bonjesta) 20-20 MG tablet controlled-release tablet Past Week Self No Yes    Take 1 tablet by mouth Every Night.    ondansetron ODT (ZOFRAN-ODT) 8 MG disintegrating tablet 11/3/2023 Self No Yes    Place 1 tablet on the tongue Every 8 (Eight) Hours As Needed for Nausea or Vomiting.              Medication notes:     This medication list is complete to the best of my knowledge as of 11/3/2023    Please call if questions.    Carlos A Og  Medication History Technician  [phone]    11/3/2023 11:03 EDT

## 2023-11-03 NOTE — CONSULTS
[facility]  Osbaldo Mayo  : 1995  MRN: [id number]  CSN: [id number]    OB Consult    Subjective   Chief Complaint   Patient presents with    Vomiting During Pregnancy     Osbaldo Mayo is a 28 y.o. year old  with an Estimated Date of Delivery: 24 currently at 9w6d presenting with ongoing issues with hyperemesis. She was seen in the ER several weeks ago for similar complaints at which time she was noted to be hypokalemic. Her emesis responds to zofran. HG has complicated her other 2 pregnancies.  She has an appointment scheduled with Dr. Vazquez at [place] in several weeks for her 1st OB visit. She denies any significant marijuana use, only using once several days ago. Her emesis has improved since hydration and anti-emetics although she is still noting some nausea.    Prenatal care has been with none.  It has been complicated by hyperemesis gravidarum.    OB History    Para Term  AB Living   1 0 0 0 0 0   SAB IAB Ectopic Molar Multiple Live Births   0 0 0 0 0 0      # Outcome Date GA Lbr Wm/2nd Weight Sex Delivery Anes PTL Lv   1 Current              Past Medical History:   Diagnosis Date    Anemia     Hypoglycemia      No past surgical history on file.    Current Facility-Administered Medications:     dextrose 5 % and sodium chloride 0.45 % infusion, 100 mL/hr, Intravenous, Continuous, Joselyn Willis APRN, Last Rate: 100 mL/hr at 23 1333, 100 mL/hr at 23 1333    doxylamine-pyridoxine 25-25 mg combo dose, , Oral, BID PRN, Joselyn Willis, APRN, Given at 23 1333    ondansetron (ZOFRAN) tablet 4 mg, 4 mg, Oral, Q6H PRN **OR** ondansetron (ZOFRAN) injection 4 mg, 4 mg, Intravenous, Q6H PRN, Joselyn Willis, APRN, 4 mg at 23 1726    sodium chloride 0.9 % flush 10 mL, 10 mL, Intravenous, Q12H, Joselyn Willis, APRN, 10 mL at 23 1254    sodium chloride 0.9 % flush 10 mL, 10 mL, Intravenous, PRN, Herth, Joselyn, APRN    sodium chloride 0.9 % infusion 40 mL, 40 mL,  "Intravenous, PRN, Herth, Joselyn, APRN    No Known Allergies  Social History    Tobacco Use      Smoking status: Former        Types: Cigars        Start date:         Quit date: 2023        Years since quittin.1      Smokeless tobacco: Never      Tobacco comments: Smoke 2 cigars a day.    Review of Systems   Gastrointestinal:  Positive for nausea.   All other systems reviewed and are negative.        Objective   /87 (BP Location: Right arm, Patient Position: Sitting)   Pulse 89   Temp 97.4 °F (36.3 °C) (Oral)   Resp 18   Ht 172.7 cm (68\")   Wt 79.4 kg (175 lb)   LMP 2023 (Exact Date)   SpO2 100%   BMI 26.61 kg/m²   General: well developed; well nourished  no acute distress   Abdomen: soft, non-tender; no masses  gravid    FHT's: Too early to monitor      Cervix: was not checked.   Presentation: Unable to appreciate   Contractions: Not monitored   Chest: Unlabored respirations    CV:  RRR   Ext:   No C/C/E   Back: CVA tenderness is deferred bilateral        Prenatal Labs  Lab Results   Component Value Date    HGB 13.5 2023    HEPBSAG Nonreactive 2020    ABORH A Negative 2023    ABSCRN Positive 2023    [id number] Nonreactive 2023    HEPCVIRUSABY Nonreactive 2020       Current Labs Reviewed   CBC w/ diff:   Lab Results   Component Value Date    WBC 5.01 2023    NEUTRORELPCT 66.2 2023    AUTOIGPER 0.2 2023    LYMPHORELPCT 24.4 2023    MONORELPCT 8.2 2023    EOSRELPCT 0.8 2023    BASORELPCT 0.2 2023    HGB 13.5 2023    HCT 41.3 2023    MCV 79.6 2023    RDW 14.0 2023     2023     CMP:   Lab Results   Component Value Date     (L) 2023    K 4.2 2023    CL 98 2023    CO2 16.4 (L) 2023    BUN 4 (L) 2023    CREATININE 0.47 (L) 2023    GLUCOSE 98 2023    ALBUMIN 4.2 2023    CALCIUM 9.8 2023    AST 29 2023    ALT 12 " 11/03/2023    BILITOT 0.4 11/03/2023     UA:    Lab Results   Component Value Date    SQUAMEPIUA 3-6 (A) 11/03/2023    SPECGRAVUR 1.024 11/03/2023    KETONESU 80 mg/dL (3+) (A) 11/03/2023    BLOODU Negative 11/03/2023    LEUKOCYTESUR Negative 11/03/2023    NITRITEU Negative 11/03/2023    RBCUA 0-2 11/03/2023    WBCUA 3-5 (A) 11/03/2023    BACTERIA None Seen 11/03/2023          Assessment   IUP at 9w6d  Hyperemesis gravidarum- responding well to IV hydration and zofran     Plan   Continue zofran. Upon d/c, provide Rx for both oral zofran and phenergan suppositories that she can alternate every 3 hours.     Jaycee Owusu MD  11/3/2023  18:39 EDT

## 2023-11-03 NOTE — ED NOTES
Nursing report ED to floor  Osbaldo Mayo  28 y.o.  female    HPI :   Chief Complaint   Patient presents with    Vomiting During Pregnancy       Admitting doctor:   Yusuf Myrick MD    Admitting diagnosis:   The encounter diagnosis was Hyperemesis gravidarum.    Code status:   Current Code Status       Date Active Code Status Order ID Comments User Context       11/3/2023 1059 CPR (Attempt to Resuscitate) [id number]  [facility]        Question Answer    Code Status (Patient has no pulse and is not breathing) CPR (Attempt to Resuscitate)    Medical Interventions (Patient has pulse or is breathing) Full Support    Level Of Support Discussed With Patient                    Allergies:   Patient has no known allergies.    Isolation:   No active isolations    Intake and Output  No intake or output data in the 24 hours ending 11/03/23 1115    Weight:   There were no vitals filed for this visit.    Most recent vitals:   Vitals:    11/03/23 0733 11/03/23 0901 11/03/23 0909 11/03/23 1001   BP: (!) 146/114 114/71  112/73   Pulse:  80 89 87   Resp:  16  16   Temp:       SpO2:  94% 100% 96%       Active LDAs/IV Access:   Lines, Drains & Airways       Active LDAs       Name Placement date Placement time Site Days    Peripheral IV 11/03/23 0800 Right Hand 11/03/23  0800  Hand  less than 1                    Labs (abnormal labs have a star):   Labs Reviewed   COMPREHENSIVE METABOLIC PANEL - Abnormal; Notable for the following components:       Result Value    BUN 4 (*)     Creatinine 0.47 (*)     Sodium 132 (*)     CO2 16.4 (*)     Anion Gap 17.6 (*)     All other components within normal limits    Narrative:     GFR Normal >60  Chronic Kidney Disease <60  Kidney Failure <15     URINALYSIS W/ MICROSCOPIC IF INDICATED (NO CULTURE) - Abnormal; Notable for the following components:    Ketones, UA 80 mg/dL (3+) (*)     Protein, UA 30 mg/dL (1+) (*)     All other components within normal limits   CBC WITH AUTO DIFFERENTIAL  - Abnormal; Notable for the following components:    MCH 26.0 (*)     All other components within normal limits   URINALYSIS, MICROSCOPIC ONLY - Abnormal; Notable for the following components:    WBC, UA 3-5 (*)     Squamous Epithelial Cells, UA 3-6 (*)     All other components within normal limits   LIPASE - Normal   HCG, QUANTITATIVE, PREGNANCY   CBC AND DIFFERENTIAL    Narrative:     The following orders were created for panel order CBC & Differential.  Procedure                               Abnormality         Status                     ---------                               -----------         ------                     CBC Auto Differential[[id number]]        Abnormal            Final result                 Please view results for these tests on the individual orders.       EKG:   No orders to display       Meds given in ED:   Medications   sodium chloride 0.9 % flush 10 mL (has no administration in time range)   sodium chloride 0.9 % flush 10 mL (has no administration in time range)   sodium chloride 0.9 % infusion 40 mL (has no administration in time range)   ondansetron (ZOFRAN) tablet 4 mg (has no administration in time range)     Or   ondansetron (ZOFRAN) injection 4 mg (has no administration in time range)   dextrose 5 % and sodium chloride 0.45 % infusion (has no administration in time range)   sodium chloride 0.9 % bolus 1,000 mL (0 mL Intravenous Stopped 11/3/23 1031)   ondansetron (ZOFRAN) injection 4 mg (4 mg Intravenous Given 11/3/23 0800)   ondansetron (ZOFRAN) injection 4 mg (4 mg Intravenous Given 11/3/23 0919)   famotidine (PEPCID) injection 20 mg (20 mg Intravenous Given 11/3/23 0919)       Imaging results:  No radiology results for the last day    Ambulatory status:   - ad halle    Social issues:   Social History     Socioeconomic History    Marital status: Single   Tobacco Use    Smoking status: Former     Types: Cigars     Start date: 2017     Quit date: 9/16/2023     Years since quitting:  0.1    Smokeless tobacco: Never    Tobacco comments:     Smoke 2 cigars a day.   Vaping Use    Vaping Use: Former    Quit date: 9/16/2023   Substance and Sexual Activity    Alcohol use: Yes     Comment: twice a month    1 drink    Drug use: Not Currently     Types: Marijuana     Comment: she smoked marijuana years ago.    Sexual activity: Defer       NIH Stroke Scale:       Anabel Montemayor RN  11/03/23 11:15 EDT

## 2023-11-04 LAB
ANION GAP SERPL CALCULATED.3IONS-SCNC: 8.7 MMOL/L (ref 5–15)
BASOPHILS # BLD AUTO: 0.01 10*3/MM3 (ref 0–0.2)
BASOPHILS NFR BLD AUTO: 0.3 % (ref 0–1.5)
BUN SERPL-MCNC: <2 MG/DL (ref 6–20)
BUN/CREAT SERPL: ABNORMAL
CALCIUM SPEC-SCNC: 8.6 MG/DL (ref 8.6–10.5)
CHLORIDE SERPL-SCNC: 105 MMOL/L (ref 98–107)
CO2 SERPL-SCNC: 19.3 MMOL/L (ref 22–29)
CREAT SERPL-MCNC: 0.36 MG/DL (ref 0.57–1)
DEPRECATED RDW RBC AUTO: 40.1 FL (ref 37–54)
EGFRCR SERPLBLD CKD-EPI 2021: 140.3 ML/MIN/1.73
EOSINOPHIL # BLD AUTO: 0.06 10*3/MM3 (ref 0–0.4)
EOSINOPHIL NFR BLD AUTO: 1.7 % (ref 0.3–6.2)
ERYTHROCYTE [DISTWIDTH] IN BLOOD BY AUTOMATED COUNT: 14 % (ref 12.3–15.4)
GLUCOSE SERPL-MCNC: 130 MG/DL (ref 65–99)
HCT VFR BLD AUTO: 34.8 % (ref 34–46.6)
HGB BLD-MCNC: 11.5 G/DL (ref 12–15.9)
IMM GRANULOCYTES # BLD AUTO: 0.01 10*3/MM3 (ref 0–0.05)
IMM GRANULOCYTES NFR BLD AUTO: 0.3 % (ref 0–0.5)
LYMPHOCYTES # BLD AUTO: 1.33 10*3/MM3 (ref 0.7–3.1)
LYMPHOCYTES NFR BLD AUTO: 37 % (ref 19.6–45.3)
MAGNESIUM SERPL-MCNC: 1.8 MG/DL (ref 1.6–2.6)
MCH RBC QN AUTO: 26.2 PG (ref 26.6–33)
MCHC RBC AUTO-ENTMCNC: 33 G/DL (ref 31.5–35.7)
MCV RBC AUTO: 79.3 FL (ref 79–97)
MONOCYTES # BLD AUTO: 0.36 10*3/MM3 (ref 0.1–0.9)
MONOCYTES NFR BLD AUTO: 10 % (ref 5–12)
NEUTROPHILS NFR BLD AUTO: 1.82 10*3/MM3 (ref 1.7–7)
NEUTROPHILS NFR BLD AUTO: 50.7 % (ref 42.7–76)
NRBC BLD AUTO-RTO: 0 /100 WBC (ref 0–0.2)
PLATELET # BLD AUTO: 227 10*3/MM3 (ref 140–450)
PMV BLD AUTO: 10.3 FL (ref 6–12)
POTASSIUM SERPL-SCNC: 3 MMOL/L (ref 3.5–5.2)
POTASSIUM SERPL-SCNC: 3 MMOL/L (ref 3.5–5.2)
RBC # BLD AUTO: 4.39 10*6/MM3 (ref 3.77–5.28)
SODIUM SERPL-SCNC: 133 MMOL/L (ref 136–145)
WBC NRBC COR # BLD: 3.59 10*3/MM3 (ref 3.4–10.8)

## 2023-11-04 PROCEDURE — 85025 COMPLETE CBC W/AUTO DIFF WBC: CPT | Performed by: NURSE PRACTITIONER

## 2023-11-04 PROCEDURE — G0378 HOSPITAL OBSERVATION PER HR: HCPCS

## 2023-11-04 PROCEDURE — 84132 ASSAY OF SERUM POTASSIUM: CPT | Performed by: PHYSICIAN ASSISTANT

## 2023-11-04 PROCEDURE — 83735 ASSAY OF MAGNESIUM: CPT

## 2023-11-04 PROCEDURE — 25010000002 POTASSIUM CHLORIDE 10 MEQ/100ML SOLUTION: Performed by: PHYSICIAN ASSISTANT

## 2023-11-04 PROCEDURE — 25010000002 POTASSIUM CHLORIDE 10 MEQ/100ML SOLUTION

## 2023-11-04 PROCEDURE — 25010000002 ONDANSETRON PER 1 MG: Performed by: NURSE PRACTITIONER

## 2023-11-04 PROCEDURE — 25010000002 ONDANSETRON PER 1 MG: Performed by: PHYSICIAN ASSISTANT

## 2023-11-04 PROCEDURE — 80048 BASIC METABOLIC PNL TOTAL CA: CPT | Performed by: NURSE PRACTITIONER

## 2023-11-04 RX ORDER — POTASSIUM CHLORIDE 750 MG/1
40 TABLET, FILM COATED, EXTENDED RELEASE ORAL EVERY 4 HOURS
Status: DISCONTINUED | OUTPATIENT
Start: 2023-11-04 | End: 2023-11-04

## 2023-11-04 RX ORDER — ONDANSETRON 2 MG/ML
8 INJECTION INTRAMUSCULAR; INTRAVENOUS EVERY 8 HOURS
Status: DISCONTINUED | OUTPATIENT
Start: 2023-11-04 | End: 2023-11-04

## 2023-11-04 RX ORDER — ONDANSETRON 4 MG/1
4 TABLET, ORALLY DISINTEGRATING ORAL EVERY 8 HOURS
Status: DISCONTINUED | OUTPATIENT
Start: 2023-11-04 | End: 2023-11-04

## 2023-11-04 RX ORDER — DEXTROSE AND SODIUM CHLORIDE 5; .45 G/100ML; G/100ML
125 INJECTION, SOLUTION INTRAVENOUS CONTINUOUS
Status: DISCONTINUED | OUTPATIENT
Start: 2023-11-04 | End: 2023-11-04

## 2023-11-04 RX ORDER — FAMOTIDINE 10 MG/ML
20 INJECTION, SOLUTION INTRAVENOUS DAILY
Status: DISCONTINUED | OUTPATIENT
Start: 2023-11-04 | End: 2023-11-05

## 2023-11-04 RX ORDER — POTASSIUM CHLORIDE 7.45 MG/ML
10 INJECTION INTRAVENOUS
Status: DISPENSED | OUTPATIENT
Start: 2023-11-04 | End: 2023-11-04

## 2023-11-04 RX ORDER — DEXTROSE MONOHYDRATE, SODIUM CHLORIDE, AND POTASSIUM CHLORIDE 50; 1.49; 4.5 G/1000ML; G/1000ML; G/1000ML
125 INJECTION, SOLUTION INTRAVENOUS CONTINUOUS
Status: DISCONTINUED | OUTPATIENT
Start: 2023-11-04 | End: 2023-11-05

## 2023-11-04 RX ORDER — PROMETHAZINE HYDROCHLORIDE 12.5 MG/1
12.5 SUPPOSITORY RECTAL EVERY 6 HOURS PRN
Status: DISCONTINUED | OUTPATIENT
Start: 2023-11-04 | End: 2023-11-04

## 2023-11-04 RX ORDER — FAMOTIDINE 10 MG/ML
20 INJECTION, SOLUTION INTRAVENOUS ONCE
Status: COMPLETED | OUTPATIENT
Start: 2023-11-04 | End: 2023-11-04

## 2023-11-04 RX ORDER — PROMETHAZINE HYDROCHLORIDE 25 MG/1
25 SUPPOSITORY RECTAL EVERY 8 HOURS
Status: DISCONTINUED | OUTPATIENT
Start: 2023-11-04 | End: 2023-11-08

## 2023-11-04 RX ORDER — POTASSIUM CHLORIDE 7.45 MG/ML
10 INJECTION INTRAVENOUS ONCE
Status: COMPLETED | OUTPATIENT
Start: 2023-11-04 | End: 2023-11-04

## 2023-11-04 RX ORDER — ONDANSETRON 2 MG/ML
8 INJECTION INTRAMUSCULAR; INTRAVENOUS EVERY 8 HOURS
Status: DISCONTINUED | OUTPATIENT
Start: 2023-11-04 | End: 2023-11-13

## 2023-11-04 RX ORDER — PROMETHAZINE HYDROCHLORIDE 12.5 MG/1
12.5 SUPPOSITORY RECTAL EVERY 8 HOURS
Status: DISCONTINUED | OUTPATIENT
Start: 2023-11-04 | End: 2023-11-04

## 2023-11-04 RX ORDER — FAMOTIDINE 20 MG/1
20 TABLET, FILM COATED ORAL DAILY
Status: DISCONTINUED | OUTPATIENT
Start: 2023-11-04 | End: 2023-11-04

## 2023-11-04 RX ADMIN — Medication 10 ML: at 09:05

## 2023-11-04 RX ADMIN — DOXYLAMINE SUCCINATE: 25 TABLET ORAL at 21:11

## 2023-11-04 RX ADMIN — ONDANSETRON 8 MG: 2 INJECTION INTRAMUSCULAR; INTRAVENOUS at 14:57

## 2023-11-04 RX ADMIN — PROMETHAZINE HYDROCHLORIDE 25 MG: 25 SUPPOSITORY RECTAL at 10:16

## 2023-11-04 RX ADMIN — POTASSIUM CHLORIDE 10 MEQ: 7.46 INJECTION, SOLUTION INTRAVENOUS at 10:15

## 2023-11-04 RX ADMIN — ONDANSETRON 4 MG: 2 INJECTION INTRAMUSCULAR; INTRAVENOUS at 05:36

## 2023-11-04 RX ADMIN — FAMOTIDINE 20 MG: 10 INJECTION INTRAVENOUS at 09:06

## 2023-11-04 RX ADMIN — Medication 10 ML: at 21:11

## 2023-11-04 RX ADMIN — FAMOTIDINE 20 MG: 20 TABLET, FILM COATED ORAL at 06:22

## 2023-11-04 RX ADMIN — ONDANSETRON 8 MG: 2 INJECTION INTRAMUSCULAR; INTRAVENOUS at 23:17

## 2023-11-04 RX ADMIN — POTASSIUM CHLORIDE 10 MEQ: 7.46 INJECTION, SOLUTION INTRAVENOUS at 22:32

## 2023-11-04 RX ADMIN — POTASSIUM CHLORIDE, DEXTROSE MONOHYDRATE AND SODIUM CHLORIDE 125 ML/HR: 150; 5; 450 INJECTION, SOLUTION INTRAVENOUS at 12:34

## 2023-11-04 RX ADMIN — POTASSIUM CHLORIDE, DEXTROSE MONOHYDRATE AND SODIUM CHLORIDE 125 ML/HR: 150; 5; 450 INJECTION, SOLUTION INTRAVENOUS at 22:32

## 2023-11-04 NOTE — PLAN OF CARE
Goal Outcome Evaluation:  Plan of Care Reviewed With: patient              Pt was still vomiting this am .  Still OB rounded on patient. Pt K+3.0 and with vomiting unable to take PO. Tried potassium replacement via IV however pt could not tolerate it in the IV. IVF's changed to D5 1/2 NS with 20MEQ KCl @100/hr. Pt has tolerated thus far. Alternating PA phenergan and IV zofran every 8 hours. Pt has not had any vomiting since med changes. Pt NPO except ice chips. Pt alert and orient times 4, VSS, RSR on the monitor. Will continue to monitor.

## 2023-11-04 NOTE — PROGRESS NOTES
[facility]  Osbaldo Mayo  : 1995  MRN: [id number]  CSN: [id number]    Hospital Day: 2    CC: hospital follow-up for hyperemesis gravidarum    Progress Note    Subjective   The patient is a 28 year old  at 10 weeks admitted for hyperemesis.  The patient states that she has lost 30 pounds during this pregnancy and has had severe hyperemesis with her previous two pregnancies as well.  The patient is scheduled for an initial prenatal appointment with Dr. Vazquez at [place].    The patient reports repeatedly vomiting overnight.  She has vomited four times in the past three hours.  Her potassium yesterday was 4.2.  Her potassium this morning is 3.0.  Patient reports feeling palpitations.  She denies any abdominal cramping or bleeding.    Review of Systems       Objective     Min/max vitals past 24 hours:   Temp  Min: 97.4 °F (36.3 °C)  Max: 98.6 °F (37 °C)  BP  Min: 109/70  Max: 127/87  Pulse  Min: 78  Max: 95  Resp  Min: 16  Max: 18         General: well developed; well nourished   Heart: regular rate and rhythm, S1, S2 normal, no murmur, click, rub or gallop   Lungs: breathing is unlabored   Abdomen: soft, non-tender; no masses       Cervix: was not checked.                   Assessment   IUP at 10w0d  Hyperemesis gravidarum  Hypokalemia  Palpitations     Plan   NPO, bowel rest  Potassium replacement.  Patient unable to tolerate oral intake so she will need IV potassium.  Patient initially concerned that it will burn going through the IV in her hand.  Advised patient that it will probably burn but it is the best way to replace her potassium since she is still vomiting.  Recheck electrolytes in am.  Telemetry was requested due to complaint of palpitations and IV potassium replacement.  Ordered Zofran IV 8 mg and Phenergan 25 mg suppositories.  These should alternated so that one medication is given every 4 hours.  Her previous regimen was not this aggressive because her potassium level was normal at  the time of admission and her vomiting was less severe  Pepcid 20 mg IV daily.  Consider a steroid taper if these interventions are not effective.              Venice Kang MD  11/4/2023  09:12 EDT

## 2023-11-04 NOTE — PLAN OF CARE
Goal Outcome Evaluation:  Plan of Care Reviewed With: patient           Outcome Evaluation: n/v, IVF,

## 2023-11-04 NOTE — PROGRESS NOTES
MD Attestation Note    I supervised care provided by the midlevel provider.    The ANSLEY and I have discussed this patient's history, physical exam, and treatment plan. I have reviewed the documentation and personally had a face to face interaction with the patient  I affirm the documentation and agree with the treatment and plan.     I provided a substantive portion of the care of the patient.  I personally performed the physical exam in its entirety, and below are my findings.        Subjective  Pt is a 28 y.o. female admitted from Emergency Department for evaluation and treatment of persistent vomiting in pregnancy.  Patient roughly 10 weeks pregnant with history of similar spells in the past.  Unfortunately she remains nauseous despite antacids, antiemetics and IV fluids.    Physical Exam  GENERAL: Alert and in mild distress with occasional vomiting, Vitals reviewed  HENT: nares patent  EYES: no scleral icterus  CV: regular rhythm, regular rate  RESPIRATORY: normal effort  ABDOMEN: soft, mild epigastric tenderness  MUSCULOSKELETAL: no deformity  NEURO: Strength sensation and coordination are grossly intact.  Speech and mentation are unremarkable  SKIN: warm, dry    Assessment/Plan  I discussed tx and evaluation of this patient with STEPHEN Bain.  Appreciate consultation from OB hospitalist Dr. Owusu.  Continue symptomatic treatment with IV fluids, antacids and IV Zofran.  Advance diet as tolerated.

## 2023-11-04 NOTE — NURSING NOTE
Pt refusing potassium IV. States it burns and woke her up from her sleep. I adjusted rate to 50 ml an hour and pt still could not tolerate the burning. Sharri treadwell.

## 2023-11-04 NOTE — PROGRESS NOTES
ED OBSERVATION PROGRESS/DISCHARGE SUMMARY    Date of Admission: 11/3/2023   LOS: 0 days   PCP: Provider, No Known    Subjective patient reports feeling drained throughout day today, states she still feels nauseous    Hospital Outcome:   28-year-old female, 10 weeks pregnant, admitted to the observation unit with a complaint of hyperemesis.  In the emergency department lab work shows sodium of 132, CO2 16.4, anion gap 17.6, BUN 4, creatinine 0.47 remainder of her lab work is unremarkable.  Urinalysis shows ketones of 80 mg/dL, protein 30 mg/dL, 3-5 WBCs, and 3-6 squamous cells.  UDS is negative.  D5 0.45% normal saline was started in the emergency department after a bolus of 1 L normal saline.    11/4: Patient was seen by OB/GYN this morning, Dr. Kang.  Alternating IV Zofran with [facility] Phenergan.  Patient also has low potassium, she has been unable to tolerate potassium runs in her peripheral IVs due to burning.  Therefore, she is receiving IV fluids with D5, half-normal saline, 20 milequivalents of KCl at 125 mL/h.  Patient n.p.o. except sips with meds and ice chips throughout day today.  We will observe tonight and advance diet in the morning and see how patient does.    ROS:  General: no fevers, chills  Respiratory: no cough, dyspnea  Cardiovascular: no chest pain, palpitations  Abdomen: No abdominal pain, nausea, vomiting, or diarrhea  Neurologic: No focal weakness    Objective   Physical Exam:  I have reviewed the vital signs.  Temp:  [97.4 °F (36.3 °C)-98.6 °F (37 °C)] 98.2 °F (36.8 °C)  Heart Rate:  [] 86  Resp:  [16-18] 16  BP: (109-146)/() 121/81  General Appearance:    Alert, cooperative, no distress  Head:    Normocephalic, atraumatic  Eyes:    Sclerae anicteric  Neck:   Supple, no mass  Lungs: Clear to auscultation bilaterally, respirations unlabored  Heart: Regular rate and rhythm, S1 and S2 normal, no murmur, rub or gallop  Abdomen:  Soft, non-tender, bowel sounds active,  nondistended  Extremities: No clubbing, cyanosis, or edema to lower extremities  Pulses:  2+ and symmetric in distal lower extremities  Skin: No rashes   Neurologic: Oriented x3, Normal strength to extremities    Results Review:    I have reviewed the labs, radiology results and diagnostic studies.    Results from last 7 days   Lab Units 11/03/23  0801   WBC 10*3/mm3 5.01   HEMOGLOBIN g/dL 13.5   HEMATOCRIT % 41.3   PLATELETS 10*3/mm3 248     Results from last 7 days   Lab Units 11/03/23  0801   SODIUM mmol/L 132*   POTASSIUM mmol/L 4.2   CHLORIDE mmol/L 98   CO2 mmol/L 16.4*   BUN mg/dL 4*   CREATININE mg/dL 0.47*   CALCIUM mg/dL 9.8   BILIRUBIN mg/dL 0.4   ALK PHOS U/L 79   ALT (SGPT) U/L 12   AST (SGOT) U/L 29   GLUCOSE mg/dL 98     Imaging Results (Last 24 Hours)       ** No results found for the last 24 hours. **            I have reviewed the medications.  ---------------------------------------------------------------------------------------------  Assessment & Plan   Assessment/Problem List    Hyperemesis gravidarum      Plan:    Hyperemesis gravidarum  -Consult to Ob-Gyn  -Prn antiemetics with zofran and diclegis  -D5 1/2 NS with 20 milequivalents KCl at 120mL/hr  -Fetal heart tones  -Alternate IV Zofran with [place] Phenergan every 4 hours  -N.p.o. except ice chips, advance diet in a.m.    Hypokalemia  -Potassium 3.0 this morning  -Patient unable to tolerate K runs and peripheral IV due to burning  -IV fluids with 20 milequivalents Kcl  -Telemetry monitoring     Disposition: Anticipate patient will be discharged home tomorrow    This note will serve as progress note    STEPHEN Rice 11/04/23 08:04 EDT    I have worn appropriate PPE during this patient encounter, sanitized my hands both with entering and exiting patient's room.      52 minutes has been spent by [facility] providers in the care of this patient while under observation status

## 2023-11-04 NOTE — NURSING NOTE
Pt screamed out in pain from the IV potassium burning. Changed out her IV to a 20  in the rt upper arm and hung NS wide open and slowed rateto 75 an hour. Pt tolerating much better, Sharri OGDEN PAC aware of change.

## 2023-11-05 PROBLEM — Z34.90 PREGNANCY: Status: ACTIVE | Noted: 2023-11-05

## 2023-11-05 LAB
ABO GROUP BLD: NORMAL
ALBUMIN SERPL-MCNC: 3.6 G/DL (ref 3.5–5.2)
ALBUMIN/GLOB SERPL: 1.3 G/DL
ALP SERPL-CCNC: 68 U/L (ref 39–117)
ALT SERPL W P-5'-P-CCNC: 7 U/L (ref 1–33)
ANION GAP SERPL CALCULATED.3IONS-SCNC: 7 MMOL/L (ref 5–15)
ANION GAP SERPL CALCULATED.3IONS-SCNC: 8 MMOL/L (ref 5–15)
AST SERPL-CCNC: 6 U/L (ref 1–32)
BILIRUB SERPL-MCNC: 0.2 MG/DL (ref 0–1.2)
BLD GP AB SCN SERPL QL: NEGATIVE
BUN SERPL-MCNC: <2 MG/DL (ref 6–20)
BUN SERPL-MCNC: <2 MG/DL (ref 6–20)
BUN/CREAT SERPL: ABNORMAL
BUN/CREAT SERPL: ABNORMAL
CALCIUM SPEC-SCNC: 8.6 MG/DL (ref 8.6–10.5)
CALCIUM SPEC-SCNC: 9.1 MG/DL (ref 8.6–10.5)
CHLORIDE SERPL-SCNC: 104 MMOL/L (ref 98–107)
CHLORIDE SERPL-SCNC: 108 MMOL/L (ref 98–107)
CO2 SERPL-SCNC: 21 MMOL/L (ref 22–29)
CO2 SERPL-SCNC: 22 MMOL/L (ref 22–29)
CREAT SERPL-MCNC: 0.38 MG/DL (ref 0.57–1)
CREAT SERPL-MCNC: 0.4 MG/DL (ref 0.57–1)
DEPRECATED RDW RBC AUTO: 43.9 FL (ref 37–54)
EGFRCR SERPLBLD CKD-EPI 2021: 136.7 ML/MIN/1.73
EGFRCR SERPLBLD CKD-EPI 2021: 138.4 ML/MIN/1.73
ERYTHROCYTE [DISTWIDTH] IN BLOOD BY AUTOMATED COUNT: 14.8 % (ref 12.3–15.4)
GLOBULIN UR ELPH-MCNC: 2.8 GM/DL
GLUCOSE BLDC GLUCOMTR-MCNC: 104 MG/DL (ref 70–130)
GLUCOSE SERPL-MCNC: 102 MG/DL (ref 65–99)
GLUCOSE SERPL-MCNC: 110 MG/DL (ref 65–99)
HCT VFR BLD AUTO: 36.5 % (ref 34–46.6)
HGB BLD-MCNC: 11.8 G/DL (ref 12–15.9)
MAGNESIUM SERPL-MCNC: 1.7 MG/DL (ref 1.6–2.6)
MCH RBC QN AUTO: 26.3 PG (ref 26.6–33)
MCHC RBC AUTO-ENTMCNC: 32.3 G/DL (ref 31.5–35.7)
MCV RBC AUTO: 81.3 FL (ref 79–97)
PLATELET # BLD AUTO: 241 10*3/MM3 (ref 140–450)
PMV BLD AUTO: 10.6 FL (ref 6–12)
POTASSIUM SERPL-SCNC: 3.2 MMOL/L (ref 3.5–5.2)
POTASSIUM SERPL-SCNC: 3.4 MMOL/L (ref 3.5–5.2)
PROT SERPL-MCNC: 6.4 G/DL (ref 6–8.5)
RBC # BLD AUTO: 4.49 10*6/MM3 (ref 3.77–5.28)
RH BLD: NEGATIVE
SODIUM SERPL-SCNC: 134 MMOL/L (ref 136–145)
SODIUM SERPL-SCNC: 136 MMOL/L (ref 136–145)
T&S EXPIRATION DATE: NORMAL
WBC NRBC COR # BLD: 3.64 10*3/MM3 (ref 3.4–10.8)

## 2023-11-05 PROCEDURE — 25010000002 POTASSIUM CHLORIDE 10 MEQ/100ML SOLUTION: Performed by: OBSTETRICS & GYNECOLOGY

## 2023-11-05 PROCEDURE — 86901 BLOOD TYPING SEROLOGIC RH(D): CPT | Performed by: OBSTETRICS & GYNECOLOGY

## 2023-11-05 PROCEDURE — 86850 RBC ANTIBODY SCREEN: CPT | Performed by: OBSTETRICS & GYNECOLOGY

## 2023-11-05 PROCEDURE — 82948 REAGENT STRIP/BLOOD GLUCOSE: CPT

## 2023-11-05 PROCEDURE — 86900 BLOOD TYPING SEROLOGIC ABO: CPT | Performed by: OBSTETRICS & GYNECOLOGY

## 2023-11-05 PROCEDURE — 80053 COMPREHEN METABOLIC PANEL: CPT | Performed by: OBSTETRICS & GYNECOLOGY

## 2023-11-05 PROCEDURE — 83735 ASSAY OF MAGNESIUM: CPT | Performed by: OBSTETRICS & GYNECOLOGY

## 2023-11-05 PROCEDURE — 25010000002 POTASSIUM CHLORIDE 10 MEQ/100ML SOLUTION

## 2023-11-05 PROCEDURE — 25010000002 ONDANSETRON PER 1 MG: Performed by: PHYSICIAN ASSISTANT

## 2023-11-05 PROCEDURE — 85027 COMPLETE CBC AUTOMATED: CPT | Performed by: OBSTETRICS & GYNECOLOGY

## 2023-11-05 RX ORDER — FAMOTIDINE 10 MG/ML
20 INJECTION, SOLUTION INTRAVENOUS EVERY 12 HOURS PRN
Status: DISCONTINUED | OUTPATIENT
Start: 2023-11-05 | End: 2023-11-15 | Stop reason: HOSPADM

## 2023-11-05 RX ORDER — POTASSIUM CHLORIDE 7.45 MG/ML
10 INJECTION INTRAVENOUS ONCE
Status: COMPLETED | OUTPATIENT
Start: 2023-11-05 | End: 2023-11-05

## 2023-11-05 RX ORDER — FAMOTIDINE 10 MG/ML
20 INJECTION, SOLUTION INTRAVENOUS DAILY
Status: DISCONTINUED | OUTPATIENT
Start: 2023-11-05 | End: 2023-11-05

## 2023-11-05 RX ORDER — POTASSIUM CHLORIDE 7.45 MG/ML
10 INJECTION INTRAVENOUS
Status: COMPLETED | OUTPATIENT
Start: 2023-11-05 | End: 2023-11-05

## 2023-11-05 RX ORDER — DEXTROSE AND SODIUM CHLORIDE 5; .45 G/100ML; G/100ML
125 INJECTION, SOLUTION INTRAVENOUS CONTINUOUS
Status: DISCONTINUED | OUTPATIENT
Start: 2023-11-05 | End: 2023-11-15 | Stop reason: HOSPADM

## 2023-11-05 RX ADMIN — PROMETHAZINE HYDROCHLORIDE 25 MG: 25 SUPPOSITORY RECTAL at 22:10

## 2023-11-05 RX ADMIN — POTASSIUM CHLORIDE, DEXTROSE MONOHYDRATE AND SODIUM CHLORIDE 125 ML/HR: 150; 5; 450 INJECTION, SOLUTION INTRAVENOUS at 05:41

## 2023-11-05 RX ADMIN — POTASSIUM CHLORIDE 10 MEQ: 7.46 INJECTION, SOLUTION INTRAVENOUS at 01:03

## 2023-11-05 RX ADMIN — POTASSIUM CHLORIDE 10 MEQ: 7.46 INJECTION, SOLUTION INTRAVENOUS at 21:24

## 2023-11-05 RX ADMIN — Medication 10 ML: at 09:31

## 2023-11-05 RX ADMIN — POTASSIUM CHLORIDE 10 MEQ: 7.46 INJECTION, SOLUTION INTRAVENOUS at 05:49

## 2023-11-05 RX ADMIN — PROMETHAZINE HYDROCHLORIDE 25 MG: 25 SUPPOSITORY RECTAL at 13:16

## 2023-11-05 RX ADMIN — DEXTROSE AND SODIUM CHLORIDE 125 ML/HR: 5; 450 INJECTION, SOLUTION INTRAVENOUS at 13:11

## 2023-11-05 RX ADMIN — POTASSIUM CHLORIDE 10 MEQ: 7.46 INJECTION, SOLUTION INTRAVENOUS at 18:44

## 2023-11-05 RX ADMIN — POTASSIUM CHLORIDE 10 MEQ: 7.46 INJECTION, SOLUTION INTRAVENOUS at 22:27

## 2023-11-05 RX ADMIN — ONDANSETRON 8 MG: 2 INJECTION INTRAMUSCULAR; INTRAVENOUS at 16:31

## 2023-11-05 RX ADMIN — FAMOTIDINE 20 MG: 10 INJECTION INTRAVENOUS at 04:21

## 2023-11-05 RX ADMIN — DEXTROSE AND SODIUM CHLORIDE 125 ML/HR: 5; 450 INJECTION, SOLUTION INTRAVENOUS at 21:49

## 2023-11-05 RX ADMIN — ONDANSETRON 8 MG: 2 INJECTION INTRAMUSCULAR; INTRAVENOUS at 05:41

## 2023-11-05 RX ADMIN — POTASSIUM CHLORIDE 10 MEQ: 7.46 INJECTION, SOLUTION INTRAVENOUS at 19:49

## 2023-11-05 RX ADMIN — DOXYLAMINE SUCCINATE: 25 TABLET ORAL at 22:08

## 2023-11-05 RX ADMIN — FAMOTIDINE 20 MG: 10 INJECTION INTRAVENOUS at 19:43

## 2023-11-05 NOTE — PLAN OF CARE
Goal Outcome Evaluation:  Plan of Care Reviewed With: patient        Progress: no change  Outcome Evaluation: 10.1 Hyperemesis. Pt still n/v. Zofran given. Labs drawn upon arrival to [facility]. Potassium 3.2 Orders given to begin replacement therapy. pt on 1/2NS with dextrose at 125ml/hr. Per MD, patient able to get up and shower. Still NPO at this time, MD wanting patient to be without n/v for 12-24 hours before begining PO diet.

## 2023-11-05 NOTE — PROGRESS NOTES
ED OBSERVATION PROGRESS/DISCHARGE SUMMARY    Date of Admission: 11/3/2023   LOS: 0 days   PCP: Provider, No Known    Subjective   No acute events overnight.  No episodes of vomiting noted.  Patient tolerating IV potassium.     Hospital Outcome:   28-year-old female, 10 weeks pregnant, admitted to the observation unit with a complaint of hyperemesis.  In the emergency department lab work shows sodium of 132, CO2 16.4, anion gap 17.6, BUN 4, creatinine 0.47 remainder of her lab work is unremarkable.  Urinalysis shows ketones of 80 mg/dL, protein 30 mg/dL, 3-5 WBCs, and 3-6 squamous cells.  UDS is negative.  D5 0.45% normal saline was started in the emergency department after a bolus of 1 L normal saline.     11/4: Patient was seen by OB/GYN this morning, Dr. Kang.  Alternating IV Zofran with [place] Phenergan.  Patient also has low potassium, she has been unable to tolerate potassium runs in her peripheral IVs due to burning.  Therefore, she is receiving IV fluids with D5, half-normal saline, 20 milequivalents of KCl at 125 mL/h.  Patient n.p.o. except sips with meds and ice chips throughout day today.  We will observe tonight and advance diet in the morning and see how patient does.     11/5: IV site change and patient is tolerating K runs without difficulty.  2 K runs given overnight.  Potassium this morning 3.4.  Patient continues to complain of nausea.  States that she vomited overnight.  Woke up around 4 AM with severe heartburn.  Patient was refusing Phenergan overnight because she states it makes her feel like a zombie.  Discussed with OB/GYN, Dr. Owusu.  Unfortunately patient not well enough to go home as she is not tolerating p.o. she needs to be without emesis for 24 hours.  Keep NPO. We will admit to obstetrics service, Dr. Owusu has graciously accepted.  Discussed with patient who expresses understanding and is in agreement with plan.     ROS:  General: no fevers, chills  Respiratory: no cough,  dyspnea  Cardiovascular: no chest pain, palpitations  Abdomen: No abdominal pain, nausea, vomiting, or diarrhea  Neurologic: No focal weakness     Objective   Physical Exam:  I have reviewed the vital signs.  Temp:  [98.2 °F (36.8 °C)-99.3 °F (37.4 °C)] 98.2 °F (36.8 °C)  Heart Rate:  [71-93] 72  Resp:  [16] 16  BP: ()/(62-66) 105/62  General Appearance:    Sleeping but easily aroused   Head:     Normocephalic, atraumatic  Eyes:     Sclerae anicteric  Neck:     Supple, no mass  Lungs: Clear to auscultation bilaterally, respirations unlabored  Heart: Regular rate and rhythm, S1 and S2 normal, no murmur, rub or gallop  Abdomen:  Soft, non-tender, bowel sounds active, nondistended  Extremities: No clubbing, cyanosis, or edema to lower extremities  Pulses:  2+ and symmetric in distal lower extremities  Skin: No rashes   Neurologic: Oriented x3, Normal strength to extremities     Results Review:    I have reviewed the labs, radiology results and diagnostic studies.          Results from last 7 days   Lab Units 11/04/23  0458   WBC 10*3/mm3 3.59   HEMOGLOBIN g/dL 11.5*   HEMATOCRIT % 34.8   PLATELETS 10*3/mm3 227             Results from last 7 days   Lab Units 11/04/23  2117 11/04/23  0458 11/03/23  0801   SODIUM mmol/L  --  133* 132*   POTASSIUM mmol/L 3.0* 3.0* 4.2   CHLORIDE mmol/L  --  105 98   CO2 mmol/L  --  19.3* 16.4*   BUN mg/dL  --  <2* 4*   CREATININE mg/dL  --  0.36* 0.47*   CALCIUM mg/dL  --  8.6 9.8   BILIRUBIN mg/dL  --   --  0.4   ALK PHOS U/L  --   --  79   ALT (SGPT) U/L  --   --  12   AST (SGOT) U/L  --   --  29   GLUCOSE mg/dL  --  130* 98      Imaging Results (Last 24 Hours)         ** No results found for the last 24 hours. **                I have reviewed the medications.  ---------------------------------------------------------------------------------------------  Assessment & Plan   Assessment/Problem List    Hyperemesis gravidarum        Plan:     Hyperemesis gravidarum  -Consult to  Ob-Gyn  -Prn antiemetics with zofran and diclegis  -D5 1/2 NS with 20 milequivalents KCl at 120mL/hr  -Fetal heart tones  -Alternate IV Zofran with [place] Phenergan every 4 hours  -N.p.o.  -Admit to obstetrics, Dr. Owusu     Hypokalemia  -Potassium 3.0 this morning  -Patient tolerated  K runs x 2  -IV fluids with 20 milequivalents Kcl  -Patient potassium improving, 3.4 this morning, will switch patient back to D5 half-normal saline as do not create hyperkalemic issue  -Per Dr. Owusu, okay to discontinue telemetry monitoring        Disposition: Admit to obstetrics, Dr. Owusu     This note will serve as a transfer summary/progress note     STEPHEN Rice 11/05/23 07:53 EST     I have worn appropriate PPE during this patient encounter, sanitized my hands both with entering and exiting patient's room.        47 minutes has been spent by [facility] providers in the care of this patient while under observation status

## 2023-11-05 NOTE — PROGRESS NOTES
MD ATTESTATION NOTE    The ANSLEY and I have discussed this patient's history, physical exam, and treatment plan.  I have reviewed the documentation and personally had a face to face interaction with the patient. I affirm the documentation and agree with the treatment and plan.  The attached note describes my personal findings.      I provided a substantive portion of the care of the patient.  I personally performed the physical exam in its entirety, and below are my findings.      Brief HPI: This patient is a 28-year-old female who is currently approximately 10 weeks pregnant admitted to the observation unit due to hyperemesis gravidarum.  The patient reports that she had similar issues with a previous pregnancy and ended up hospitalized for several days with IV fluids and Zofran infusion.  She currently complains of generalized weakness as well as ongoing nausea.  She denies abdominal pain, fever/chills, chest pain, or shortness of breath.      PHYSICAL EXAM  ED Triage Vitals   Temp Heart Rate Resp BP SpO2   11/03/23 0731 11/03/23 0731 11/03/23 0731 11/03/23 0733 11/03/23 0731   97.5 °F (36.4 °C) (!) 150 16 (!) 146/114 95 %      Temp src Heart Rate Source Patient Position BP Location FiO2 (%)   11/03/23 1258 11/03/23 1258 11/03/23 1258 11/03/23 1258 --   Oral Monitor Sitting Right arm        GENERAL: Resting comfortably and in no acute distress, nontoxic in appearance  HENT: nares patent  EYES: no scleral icterus  CV: regular rhythm, normal rate, no M/R/G  RESPIRATORY: normal effort, lungs clear bilaterally  ABDOMEN: soft, nontender, no rebound or guarding  MUSCULOSKELETAL: no deformity, no edema  NEURO: alert, moves all extremities, follows commands  PSYCH:  calm, cooperative  SKIN: warm, dry    Vital signs and nursing notes reviewed.        Plan: We will continue to treat the patient with IV fluids as well as IV antiemetics for symptomatic treatment.  OB/GYN will continue to consult on the patient for further  treatment planning and ultimate disposition.

## 2023-11-05 NOTE — NURSING NOTE
Patient is alert and oriented times 4. Patient transferred to the Antepartum unit and report was given.

## 2023-11-05 NOTE — PLAN OF CARE
Goal Outcome Evaluation:  Plan of Care Reviewed With: patient           Outcome Evaluation: n/v, IVF,       n/v meds given, IVF w/potass, potassium of 3.4 this morning, and 3x doses of potass bags

## 2023-11-05 NOTE — DISCHARGE SUMMARY
ED OBSERVATION PROGRESS/DISCHARGE SUMMARY    Date of Admission: 11/3/2023   LOS: 0 days   PCP: Provider, No Known      Subjective   No acute events overnight.  No episodes of vomiting noted.  Patient tolerating IV potassium.    Hospital Outcome:   28-year-old female, 10 weeks pregnant, admitted to the observation unit with a complaint of hyperemesis.  In the emergency department lab work shows sodium of 132, CO2 16.4, anion gap 17.6, BUN 4, creatinine 0.47 remainder of her lab work is unremarkable.  Urinalysis shows ketones of 80 mg/dL, protein 30 mg/dL, 3-5 WBCs, and 3-6 squamous cells.  UDS is negative.  D5 0.45% normal saline was started in the emergency department after a bolus of 1 L normal saline.     11/4: Patient was seen by OB/GYN this morning, Dr. Kang.  Alternating IV Zofran with [place] Phenergan.  Patient also has low potassium, she has been unable to tolerate potassium runs in her peripheral IVs due to burning.  Therefore, she is receiving IV fluids with D5, half-normal saline, 20 milequivalents of KCl at 125 mL/h.  Patient n.p.o. except sips with meds and ice chips throughout day today.  We will observe tonight and advance diet in the morning and see how patient does.    11/5: IV site change and patient is tolerating K runs without difficulty.  2 K runs given overnight.  Potassium this morning 3.4.  Patient continues to complain of nausea.  States that she vomited overnight.  Woke up around 4 AM with severe heartburn.  Patient was refusing Phenergan overnight because she states it makes her feel like a zombie.  Discussed with OB/GYN, Dr. Owusu.  Unfortunately patient not well enough to go home as she is not tolerating p.o. she needs to be without emesis for 24 hours.  Keep NPO. We will admit to obstetrics service, Dr. Owusu has graciously accepted.  Discussed with patient who expresses understanding and is in agreement with plan.    ROS:  General: no fevers, chills  Respiratory: no cough,  dyspnea  Cardiovascular: no chest pain, palpitations  Abdomen: No abdominal pain, nausea, vomiting, or diarrhea  Neurologic: No focal weakness    Objective   Physical Exam:  I have reviewed the vital signs.  Temp:  [98.2 °F (36.8 °C)-99.3 °F (37.4 °C)] 98.2 °F (36.8 °C)  Heart Rate:  [71-93] 72  Resp:  [16] 16  BP: ()/(62-66) 105/62  General Appearance:    Alert, cooperative, no distress  Head:    Normocephalic, atraumatic  Eyes:    Sclerae anicteric  Neck:   Supple, no mass  Lungs: Clear to auscultation bilaterally, respirations unlabored  Heart: Regular rate and rhythm, S1 and S2 normal, no murmur, rub or gallop  Abdomen:  Soft, non-tender, bowel sounds active, nondistended  Extremities: No clubbing, cyanosis, or edema to lower extremities  Pulses:  2+ and symmetric in distal lower extremities  Skin: No rashes   Neurologic: Oriented x3, Normal strength to extremities    Results Review:    I have reviewed the labs, radiology results and diagnostic studies.    Results from last 7 days   Lab Units 11/04/23  0458   WBC 10*3/mm3 3.59   HEMOGLOBIN g/dL 11.5*   HEMATOCRIT % 34.8   PLATELETS 10*3/mm3 227     Results from last 7 days   Lab Units 11/04/23  2117 11/04/23  0458 11/03/23  0801   SODIUM mmol/L  --  133* 132*   POTASSIUM mmol/L 3.0* 3.0* 4.2   CHLORIDE mmol/L  --  105 98   CO2 mmol/L  --  19.3* 16.4*   BUN mg/dL  --  <2* 4*   CREATININE mg/dL  --  0.36* 0.47*   CALCIUM mg/dL  --  8.6 9.8   BILIRUBIN mg/dL  --   --  0.4   ALK PHOS U/L  --   --  79   ALT (SGPT) U/L  --   --  12   AST (SGOT) U/L  --   --  29   GLUCOSE mg/dL  --  130* 98     Imaging Results (Last 24 Hours)       ** No results found for the last 24 hours. **            I have reviewed the medications.  ---------------------------------------------------------------------------------------------  Assessment & Plan   Assessment/Problem List    Hyperemesis gravidarum      Plan:    Hyperemesis gravidarum  -Consult to Ob-Gyn  -Prn antiemetics with  zofran and diclegis  -D5 1/2 NS with 20 milequivalents KCl at 120mL/hr  -Fetal heart tones  -Alternate IV Zofran with [place] Phenergan every 4 hours  -N.p.o.  -Admit to obstetrics, Dr. Owusu     Hypokalemia  -Potassium 3.0 this morning  -Patient tolerated  K runs x 2  -IV fluids with 20 milequivalents Kcl  -Patient potassium improving, 3.4 this morning, will switch patient back to D5 half-normal saline as do not create hyperkalemic issue  -Telemetry monitoring       Disposition: Admit to obstetrics, Dr. Owusu    This note will serve as a transfer summary/progress note    STEPHEN Rice 11/05/23 07:53 EST    I have worn appropriate PPE during this patient encounter, sanitized my hands both with entering and exiting patient's room.      47 minutes has been spent by [facility] providers in the care of this patient while under observation status

## 2023-11-05 NOTE — PROGRESS NOTES
[facility]  Osbaldo Mayo  : 1995  MRN: [id number]  CSN: [id number]    OB Progress Note    Subjective   Chief Complaint   Patient presents with    Vomiting During Pregnancy     Osbaldo Mayo is a 28 y.o. year old  with an Estimated Date of Delivery: 24 currently at 10w1d presenting with ongoing hyperemesis gravidarum. She vomited repeatedly overnight and this morning after refusing phenergan because she doesn't like the way it makes her feel. The palpitations she was having yesterday have resolved.     Prenatal care has not been established.  It has been complicated by hyperemesis gravidarum.    OB History    Para Term  AB Living   1 0 0 0 0 0   SAB IAB Ectopic Molar Multiple Live Births   0 0 0 0 0 0      # Outcome Date GA Lbr Wm/2nd Weight Sex Delivery Anes PTL Lv   1 Current              Past Medical History:   Diagnosis Date    Anemia     Hypoglycemia      No past surgical history on file.    Current Facility-Administered Medications:     dextrose 5 % and sodium chloride 0.45 % with KCl 20 mEq/L infusion, 125 mL/hr, Intravenous, Continuous, Sharri Bain PA, Last Rate: 125 mL/hr at 23 1114, 125 mL/hr at 23 1114    doxylamine-pyridoxine 25-25 mg combo dose, , Oral, Nightly, Sharri Bain PA, Given at 23 2111    famotidine (PEPCID) injection 20 mg, 20 mg, Intravenous, Daily, Cherelle Panda APRN, 20 mg at 23 0421    ondansetron (ZOFRAN) injection 8 mg, 8 mg, Intravenous, Q8H, Sharri Bain PA, 8 mg at 23 0541    promethazine (PHENERGAN) suppository 25 mg, 25 mg, Rectal, Q8H, Venice Kang MD, 25 mg at 23 1016    sodium chloride 0.9 % flush 10 mL, 10 mL, Intravenous, Q12H, Joselyn Willis APRN, 10 mL at 23 0931    sodium chloride 0.9 % flush 10 mL, 10 mL, Intravenous, PRN, Joselyn Willis, APRN    sodium chloride 0.9 % infusion 40 mL, 40 mL, Intravenous, PRN, Herleila, Joselyn, TIARA    No Known Allergies  Social History     "Tobacco Use      Smoking status: Former        Types: Cigars        Start date:         Quit date: 2023        Years since quittin.1      Smokeless tobacco: Never      Tobacco comments: Smoke 2 cigars a day.    Review of Systems   Gastrointestinal:  Positive for nausea and vomiting.         Objective   BP 96/57 (BP Location: Left arm, Patient Position: Lying)   Pulse 77   Temp 98.8 °F (37.1 °C) (Oral)   Resp 17   Ht 172.7 cm (68\")   Wt 79.4 kg (175 lb)   LMP 2023 (Exact Date)   SpO2 100%   BMI 26.61 kg/m²   General: well developed; well nourished  mildly distressed   Abdomen: soft, non-tender; no masses  gravid    FHT's: Not assessed      Cervix: was not checked.   Presentation: Unable to appreciate   Contractions: Not monitored   Chest: Unlabored respirations    CV:  RRR   Ext:   No C/C/E   Back: CVA tenderness is deferred bilateral        Prenatal Labs  Lab Results   Component Value Date    HGB 11.5 (L) 2023    HEPBSAG Nonreactive 2020    ABORH A Negative 2023    ABSCRN Positive 2023    LFV3XNC9 Nonreactive 2023    HEPCVIRUSABY Nonreactive 2020       Current Labs Reviewed   CBC w/ diff:   Lab Results   Component Value Date    WBC 3.59 2023    NEUTRORELPCT 50.7 2023    AUTOIGPER 0.3 2023    LYMPHORELPCT 37.0 2023    MONORELPCT 10.0 2023    EOSRELPCT 1.7 2023    BASORELPCT 0.3 2023    HGB 11.5 (L) 2023    HCT 34.8 2023    MCV 79.3 2023    RDW 14.0 2023     2023     CMP:   Lab Results   Component Value Date     2023    K 3.4 (L) 2023     (H) 2023    CO2 21.0 (L) 2023    BUN <2 (L) 2023    CREATININE 0.38 (L) 2023    GLUCOSE 110 (H) 2023    ALBUMIN 4.2 2023    CALCIUM 8.6 2023    AST 29 2023    ALT 12 2023    BILITOT 0.4 2023          Assessment   IUP at 10w1d  Hyperemesis gravidarum- still " vomiting this am after phenergan refusal.  Hypokalemia- secondary to GI losses. Currently repleting.     Plan   Admit for further fluids, K+ repletion. I offered her reglan rather than phenergan to help get a handle on her emesis but she states she soils herself with reglan. Given her ongoing issues with emesis, she opts to resume phenergan in conjunction with zofran. Hold NPO. Will need to make full admit due to 48 hours in observation unit.    Jaycee Owusu MD  11/5/2023  12:35 EST

## 2023-11-06 LAB
ALBUMIN SERPL-MCNC: 3 G/DL (ref 3.5–5.2)
ALBUMIN/GLOB SERPL: 1 G/DL
ALP SERPL-CCNC: 59 U/L (ref 39–117)
ALT SERPL W P-5'-P-CCNC: 6 U/L (ref 1–33)
ANION GAP SERPL CALCULATED.3IONS-SCNC: 8.5 MMOL/L (ref 5–15)
AST SERPL-CCNC: 10 U/L (ref 1–32)
BILIRUB SERPL-MCNC: 0.2 MG/DL (ref 0–1.2)
BUN SERPL-MCNC: <2 MG/DL (ref 6–20)
BUN/CREAT SERPL: ABNORMAL
BUPRENORPHINE UR QL: NEGATIVE NG/ML
CALCIUM SPEC-SCNC: 8.7 MG/DL (ref 8.6–10.5)
CHLORIDE SERPL-SCNC: 107 MMOL/L (ref 98–107)
CO2 SERPL-SCNC: 20.5 MMOL/L (ref 22–29)
CREAT SERPL-MCNC: 0.4 MG/DL (ref 0.57–1)
EGFRCR SERPLBLD CKD-EPI 2021: 136.7 ML/MIN/1.73
GLOBULIN UR ELPH-MCNC: 2.9 GM/DL
GLUCOSE SERPL-MCNC: 109 MG/DL (ref 65–99)
MAGNESIUM SERPL-MCNC: 1.6 MG/DL (ref 1.6–2.6)
POTASSIUM SERPL-SCNC: 3.3 MMOL/L (ref 3.5–5.2)
POTASSIUM SERPL-SCNC: 3.3 MMOL/L (ref 3.5–5.2)
PROT SERPL-MCNC: 5.9 G/DL (ref 6–8.5)
SODIUM SERPL-SCNC: 136 MMOL/L (ref 136–145)

## 2023-11-06 PROCEDURE — 80053 COMPREHEN METABOLIC PANEL: CPT | Performed by: OBSTETRICS & GYNECOLOGY

## 2023-11-06 PROCEDURE — 25010000002 POTASSIUM CHLORIDE 10 MEQ/100ML SOLUTION: Performed by: OBSTETRICS & GYNECOLOGY

## 2023-11-06 PROCEDURE — 25010000002 ONDANSETRON PER 1 MG: Performed by: PHYSICIAN ASSISTANT

## 2023-11-06 PROCEDURE — 84132 ASSAY OF SERUM POTASSIUM: CPT | Performed by: OBSTETRICS & GYNECOLOGY

## 2023-11-06 PROCEDURE — 83735 ASSAY OF MAGNESIUM: CPT | Performed by: OBSTETRICS & GYNECOLOGY

## 2023-11-06 RX ORDER — POTASSIUM CHLORIDE 7.45 MG/ML
10 INJECTION INTRAVENOUS
Status: COMPLETED | OUTPATIENT
Start: 2023-11-06 | End: 2023-11-06

## 2023-11-06 RX ORDER — POTASSIUM CHLORIDE 7.45 MG/ML
10 INJECTION INTRAVENOUS
Status: COMPLETED | OUTPATIENT
Start: 2023-11-06 | End: 2023-11-07

## 2023-11-06 RX ADMIN — POTASSIUM CHLORIDE 10 MEQ: 7.46 INJECTION, SOLUTION INTRAVENOUS at 20:35

## 2023-11-06 RX ADMIN — FAMOTIDINE 20 MG: 10 INJECTION INTRAVENOUS at 09:09

## 2023-11-06 RX ADMIN — FAMOTIDINE 20 MG: 10 INJECTION INTRAVENOUS at 21:28

## 2023-11-06 RX ADMIN — POTASSIUM CHLORIDE 10 MEQ: 7.46 INJECTION, SOLUTION INTRAVENOUS at 06:46

## 2023-11-06 RX ADMIN — DEXTROSE AND SODIUM CHLORIDE 125 ML/HR: 5; 450 INJECTION, SOLUTION INTRAVENOUS at 05:33

## 2023-11-06 RX ADMIN — DEXTROSE AND SODIUM CHLORIDE 125 ML/HR: 5; 450 INJECTION, SOLUTION INTRAVENOUS at 21:59

## 2023-11-06 RX ADMIN — ONDANSETRON 8 MG: 2 INJECTION INTRAMUSCULAR; INTRAVENOUS at 00:42

## 2023-11-06 RX ADMIN — ONDANSETRON 8 MG: 2 INJECTION INTRAMUSCULAR; INTRAVENOUS at 07:58

## 2023-11-06 RX ADMIN — POTASSIUM CHLORIDE 10 MEQ: 7.46 INJECTION, SOLUTION INTRAVENOUS at 22:12

## 2023-11-06 RX ADMIN — POTASSIUM CHLORIDE 10 MEQ: 7.46 INJECTION, SOLUTION INTRAVENOUS at 05:33

## 2023-11-06 RX ADMIN — DEXTROSE AND SODIUM CHLORIDE 125 ML/HR: 5; 450 INJECTION, SOLUTION INTRAVENOUS at 13:57

## 2023-11-06 RX ADMIN — PROMETHAZINE HYDROCHLORIDE 25 MG: 25 SUPPOSITORY RECTAL at 10:17

## 2023-11-06 RX ADMIN — POTASSIUM CHLORIDE 10 MEQ: 7.46 INJECTION, SOLUTION INTRAVENOUS at 10:16

## 2023-11-06 RX ADMIN — PROMETHAZINE HYDROCHLORIDE 25 MG: 25 SUPPOSITORY RECTAL at 18:11

## 2023-11-06 RX ADMIN — POTASSIUM CHLORIDE 10 MEQ: 7.46 INJECTION, SOLUTION INTRAVENOUS at 18:20

## 2023-11-06 RX ADMIN — DOXYLAMINE SUCCINATE: 25 TABLET ORAL at 21:31

## 2023-11-06 RX ADMIN — POTASSIUM CHLORIDE 10 MEQ: 7.46 INJECTION, SOLUTION INTRAVENOUS at 09:12

## 2023-11-06 RX ADMIN — ONDANSETRON 8 MG: 2 INJECTION INTRAMUSCULAR; INTRAVENOUS at 18:10

## 2023-11-06 NOTE — PLAN OF CARE
Problem: Oral Intake Inadequate   -due to Hyperemesis gravidarum  Goal: Improved Oral Intake  Outcome: Ongoing, Progressing   Goal Outcome Evaluation:   NPO   IVF @ 125 ml/hr   RD following for nutrition needs

## 2023-11-06 NOTE — PROGRESS NOTES
"Nutrition Services    Patient Name:  Osbaldo Mayo  YOB: 1995  MRN: [id number]  Admit Date:  11/3/2023    Assessment Date:  23    CLINICAL NUTRITION ASSESSMENT      Reason for Assessment Diagnosis, MST score 2+, Unintentional Weight Loss      Diagnosis/Problem   Hyperemesis Gravidarum   -27 yo  with EDC 24 presented with ongoing issues with nausea and vomiting, causing dehydration and hypokalemia/e'lyte abnormality. She has reportedly lost 30 lb with this pregnancy. Getting IV fluids and anti-nausea meds. NPO at present. Plans to advance diet once n/v controlled. IVF @ 125 ml/hr.    Medical/Surgical History Past Medical History:   Diagnosis Date    Anemia     Heart murmur     as a child    Hypoglycemia     Uterine fibroids affecting pregnancy, antepartum        Past Surgical History:   Procedure Laterality Date    DILATATION AND CURETTAGE      WISDOM TOOTH EXTRACTION          Anthropometrics        Current Height  Current Weight  BMI kg/m2 Height: 172.7 cm (68\")  Weight: 79.4 kg (175 lb) (23 1116)  Body mass index is 26.61 kg/m².   Adjusted BMI (if applicable)    BMI Category Overweight (25 - 29.9)   Ideal Body Weight (IBW) 140#   Usual Body Weight (UBW) 179# 2023   Weight Trend Other: pt reported 30 lb loss?   Weight History Wt Readings from Last 30 Encounters:   23 1116 79.4 kg (175 lb)   10/16/23 1157 84.4 kg (186 lb)   23 2239 81.2 kg (179 lb)   16 1440 62.1 kg (137 lb)   16 0906 62.1 kg (137 lb)   16 1317 62.1 kg (137 lb)   16 2242 62.1 kg (137 lb)        Estimated/Assessed Needs        Current Weight  Weight: 79.4 kg (175 lb) (23 1116)       Energy Requirements    Weight for Calculation 79.4 kg   Method for Estimation  25 kcal/kg, 30 kcal/kg   EST Needs (kcal/day) 7847-5443       Protein Requirements    Weight for Calculation 79.4 kg   EST Protein Needs (g/kg) 1.0 - 1.2 gm/kg   EST Daily Needs (g/day) 79-95       Fluid " "Requirements     Method for Estimation 30 mL/kg    EST Needs (mL/day) 2382      Labs       Pertinent Labs    Results from last 7 days   Lab Units 11/06/23  1426 11/06/23 0335 11/05/23 1643 11/05/23 0428 11/04/23 0458 11/03/23  0801   SODIUM mmol/L  --  136 134* 136   < > 132*   POTASSIUM mmol/L 3.3* 3.3* 3.2* 3.4*   < > 4.2   CHLORIDE mmol/L  --  107 104 108*   < > 98   CO2 mmol/L  --  20.5* 22.0 21.0*   < > 16.4*   BUN mg/dL  --  <2* <2* <2*   < > 4*   CREATININE mg/dL  --  0.40* 0.40* 0.38*   < > 0.47*   CALCIUM mg/dL  --  8.7 9.1 8.6   < > 9.8   BILIRUBIN mg/dL  --  0.2 0.2  --   --  0.4   ALK PHOS U/L  --  59 68  --   --  79   ALT (SGPT) U/L  --  6 7  --   --  12   AST (SGOT) U/L  --  10 6  --   --  29   GLUCOSE mg/dL  --  109* 102* 110*   < > 98    < > = values in this interval not displayed.     Results from last 7 days   Lab Units 11/06/23 0335 11/05/23 1643 11/04/23 0458   MAGNESIUM mg/dL 1.6 1.7 1.8   HEMOGLOBIN g/dL  --  11.8* 11.5*   HEMATOCRIT %  --  36.5 34.8   WBC 10*3/mm3  --  3.64 3.59   ALBUMIN g/dL 3.0* 3.6  --      Results from last 7 days   Lab Units 11/05/23 1643 11/04/23 0458 11/03/23  0801   PLATELETS 10*3/mm3 241 227 248     No results found for: \"COVID19\"  No results found for: \"HGBA1C\"       Medications           Scheduled Medications doxylamine-pyridoxine 25-25 mg combo dose, , Oral, Nightly  ondansetron, 8 mg, Intravenous, Q8H  promethazine, 25 mg, Rectal, Q8H  sodium chloride, 10 mL, Intravenous, Q12H       Infusions dextrose 5 % and sodium chloride 0.45 %, 125 mL/hr, Last Rate: 125 mL/hr (11/06/23 1357)       PRN Medications   famotidine    Potassium Replacement - Follow Nurse / BPA Driven Protocol    Potassium Replacement - Follow Nurse / BPA Driven Protocol    sodium chloride    sodium chloride     Physical Findings          General Findings alert   Oral/Mouth Cavity WDL   Edema  no edema   Gastrointestinal hypoactive bowel sounds, nausea, last bowel movement: 11/1   Skin  " skin intact   Tubes/Drains/Lines none   NFPE Not indicated at this time   --  Current Nutrition Orders & Evaluation of Intake       Oral Nutrition     Food Allergies NKFA   Current PO Diet NPO Diet NPO Type: Strict NPO   Supplement n/a   PO Evaluation     % PO Intake NPO    Factors Affecting Intake: nausea, vomiting, weakness   --  PES STATEMENT / NUTRITION DIAGNOSIS      Nutrition Dx Problem  Problem: Inadequate Oral Intake  Etiology: Medical Diagnosis - Hyperemesis Gravidarum    Signs/Symptoms: PO Diet Not Tolerated     NUTRITION INTERVENTION / PLAN OF CARE      Intervention Goal(s) Reduce/improve symptoms, Disease management/therapy, and Initiate feeding/diet         RD Intervention/Action Await initiation/advancement of PO diet   --      Prescription/Orders:       PO Diet Advance diet as tolerated per MD orders      Supplements       Enteral Nutrition       Parenteral Nutrition    New Prescription Ordered? No changes at this time   --      Monitor/Evaluation Per protocol, I&O, PO intake, Pertinent labs, Weight, GI status, Symptoms, POC/GOC   Discharge Plan/Needs Pending clinical course   --    RD to follow per protocol.  Electronically signed by:  Nicole Mason RD  11/06/23 15:56 EST

## 2023-11-06 NOTE — PLAN OF CARE
Goal Outcome Evaluation:  Plan of Care Reviewed With: patient           Outcome Evaluation: VSS and afebrile. n/v continues. pt. receiving zofran, and phenergan to combat nausea. potassium remains at 3.2. pt. continuing to receive IV potassium replacement at this time. 1/2NS with dextrose at 125cc/hr. no vomiting occured this shift. NPO. pt. slept throughout the night.

## 2023-11-06 NOTE — PROGRESS NOTES
[facility]  Osbaldo Mayo  : 1995  MRN: [id number]  CSN: [id number]    Hospital Day: 4        Antepartum Progress Note    Subjective       Osbaldo Mayo is a 28 y.o. female  at 10w2d admitted secondary to ongoing nausea and vomiting consistent with hyperemesis gravidarum.  This morning her potassium is still 3.2 and she was advised that she would be staying for at least 1 additional day while repletion of her electrolytes continues.  She continues to have nausea and vomiting but is slightly improved from yesterday.      Review of Systems       Objective     Min/max vitals past 24 hours:   Temp  Min: 97.9 °F (36.6 °C)  Max: 99.3 °F (37.4 °C)  BP  Min: 98/59  Max: 119/75  Pulse  Min: 67  Max: 86  Resp  Min: 16  Max: 20               General: no acute distress   Heart: Not performed.   Lungs: breathing is unlabored   Abdomen: soft, non-tender; no masses       Cervix: was not checked.   Contractions: none             Recent Results (from the past 24 hour(s))   POC Glucose Once    Collection Time: 23  3:48 PM    Specimen: Blood   Result Value Ref Range    Glucose 104 70 - 130 mg/dL   Magnesium    Collection Time: 23  4:43 PM    Specimen: Blood   Result Value Ref Range    Magnesium 1.7 1.6 - 2.6 mg/dL   CBC (No Diff)    Collection Time: 23  4:43 PM    Specimen: Blood   Result Value Ref Range    WBC 3.64 3.40 - 10.80 10*3/mm3    RBC 4.49 3.77 - 5.28 10*6/mm3    Hemoglobin 11.8 (L) 12.0 - 15.9 g/dL    Hematocrit 36.5 34.0 - 46.6 %    MCV 81.3 79.0 - 97.0 fL    MCH 26.3 (L) 26.6 - 33.0 pg    MCHC 32.3 31.5 - 35.7 g/dL    RDW 14.8 12.3 - 15.4 %    RDW-SD 43.9 37.0 - 54.0 fl    MPV 10.6 6.0 - 12.0 fL    Platelets 241 140 - 450 10*3/mm3   Type & Screen    Collection Time: 23  4:43 PM    Specimen: Blood   Result Value Ref Range    ABO Type A     RH type Negative     Antibody Screen Negative     T&S Expiration Date 2023 11:59:59 PM    Comprehensive Metabolic Panel    Collection  Time: 11/05/23  4:43 PM    Specimen: Blood   Result Value Ref Range    Glucose 102 (H) 65 - 99 mg/dL    BUN <2 (L) 6 - 20 mg/dL    Creatinine 0.40 (L) 0.57 - 1.00 mg/dL    Sodium 134 (L) 136 - 145 mmol/L    Potassium 3.2 (L) 3.5 - 5.2 mmol/L    Chloride 104 98 - 107 mmol/L    CO2 22.0 22.0 - 29.0 mmol/L    Calcium 9.1 8.6 - 10.5 mg/dL    Total Protein 6.4 6.0 - 8.5 g/dL    Albumin 3.6 3.5 - 5.2 g/dL    ALT (SGPT) 7 1 - 33 U/L    AST (SGOT) 6 1 - 32 U/L    Alkaline Phosphatase 68 39 - 117 U/L    Total Bilirubin 0.2 0.0 - 1.2 mg/dL    Globulin 2.8 gm/dL    A/G Ratio 1.3 g/dL    BUN/Creatinine Ratio      Anion Gap 8.0 5.0 - 15.0 mmol/L    eGFR 136.7 >60.0 mL/min/1.73   Comprehensive Metabolic Panel    Collection Time: 11/06/23  3:35 AM    Specimen: Blood   Result Value Ref Range    Glucose 109 (H) 65 - 99 mg/dL    BUN <2 (L) 6 - 20 mg/dL    Creatinine 0.40 (L) 0.57 - 1.00 mg/dL    Sodium 136 136 - 145 mmol/L    Potassium 3.3 (L) 3.5 - 5.2 mmol/L    Chloride 107 98 - 107 mmol/L    CO2 20.5 (L) 22.0 - 29.0 mmol/L    Calcium 8.7 8.6 - 10.5 mg/dL    Total Protein 5.9 (L) 6.0 - 8.5 g/dL    Albumin 3.0 (L) 3.5 - 5.2 g/dL    ALT (SGPT) 6 1 - 33 U/L    AST (SGOT) 10 1 - 32 U/L    Alkaline Phosphatase 59 39 - 117 U/L    Total Bilirubin 0.2 0.0 - 1.2 mg/dL    Globulin 2.9 gm/dL    A/G Ratio 1.0 g/dL    BUN/Creatinine Ratio      Anion Gap 8.5 5.0 - 15.0 mmol/L    eGFR 136.7 >60.0 mL/min/1.73   Magnesium    Collection Time: 11/06/23  3:35 AM    Specimen: Blood   Result Value Ref Range    Magnesium 1.6 1.6 - 2.6 mg/dL                   Assessment   IUP at 10w2d  Hyperemesis gravidarum with potassium of 3.2 and continuing repletion of electrolytes     Plan   Continue current management possible discharge home tomorrow if her electrolytes are within the normal range and her nausea vomiting is adequately controlled.    Stephane Garza MD  11/6/2023  11:01 [facility]  OB Hospitalist  Phone:  [phone]

## 2023-11-06 NOTE — PROGRESS NOTES
"Continued Stay Note  [facility]     Patient Name: Osbaldo Mayo  MRN: [id number]  Today's Date: 11/6/2023    Admit Date: 11/3/2023        Discharge Plan       Row Name 11/06/23 1434       Plan    Plan Comments Mother: Osbaldo Mayo, MRN: [id number]. CSW consulted for \"database.\" Of note, mother's UDS was not collected on admit. CSW reviewed mother's chart and could not determine why a social work consult was placed. CSW called the antepartum unit and mother's RN was unsure why the SW consult was placed on 11/3. CSW made the decision that mother did not require a needs-based assessment at this time. CSW will remain available for psychosocial needs during mother's hospitalization. PORSCHE Meek.                   Discharge Codes    No documentation.                       GUNJAN Dixon    "

## 2023-11-06 NOTE — PLAN OF CARE
"Goal Outcome Evaluation:              Outcome Evaluation: VSS and pt afebrile.  N/V continues, with main complaint being of acid reflux.  Pt had one bout of unmeasured emesis, but is mostly \"spitty.\"  Pt continuing to receive Zofran and phenergan scheduled.  Pt K+ still at 3.3 and 4 more runs initiated.  Pt not tolerating original rate of K+, so decreased to 75 ml/h with LR going going at KVO.  Pt remained NPO.  Hopeful to start eating small snack in am (i.e. crackers or toast).         "

## 2023-11-06 NOTE — PAYOR COMM NOTE
"Osbaldo Ralph (28 y.o. Female)     ATTN: NURSE REVIEWER  RE: MATERNITY INPATIENT AUTH REQUEST- PATIENT DID NOT DELIVER!!!!!  REF#[id number]  PLS REPLY TO [facility] [phone] OR [facility] [phone] FAX# [phone]             Date of Birth   1995    Social Security Number       Address   [address] [place] [address]    Home Phone   [phone]    MRN   [id number]       Gnosticist   None    Marital Status   Single                            Admission Date   11/3/23    Admission Type   Emergency    Admitting Provider   Jaycee Owusu MD    Attending Provider   Jaycee Owusu MD    Department, Room/Bed   [facility] ANTEPARTUM UNIT, 3101/1       Discharge Date       Discharge Disposition       Discharge Destination                                 Attending Provider: Jaycee Owusu MD    Allergies: No Known Allergies    Isolation: None   Infection: None   Code Status: CPR    Ht: 172.7 cm (68\")   Wt: 79.4 kg (175 lb)    Admission Cmt: None   Principal Problem: Hyperemesis gravidarum [O21.0]                   Active Insurance as of 11/3/2023       Primary Coverage       Payor Plan Insurance Group Employer/Plan Group    [facility] BY [facility] [id number]       Payor Plan Address Payor Plan Phone Number Payor Plan Fax Number Effective Dates    [address] [address]   1/1/2021 - None Entered    [place] [address]         Subscriber Name Subscriber Birth Date Member ID       OSBALDO RALPH 1995 [id number]                     Emergency Contacts        (Rel.) Home Phone Work Phone Mobile Phone    Carolyn Ralph (Mother) -- -- [phone]                 History & Physical        Joselyn Willis APRN at 11/03/23 1106       Attestation signed by Yusuf Myrick MD at 11/03/23 2151    I have reviewed this documentation and agree.  MD ATTESTATION NOTE    The ANSLEY and I have discussed this patient's history, physical exam, and treatment " plan.  I have reviewed the documentation and personally had a face to face interaction with the patient. I affirm the documentation and agree with the treatment and plan.  The attached note describes my personal findings.      I provided a substantive portion of the care of the patient.  I personally performed the physical exam in its entirety, and below are my findings.     Brief HPI: 28-year-old who is 9 weeks pregnant with recurrent nausea and vomiting.  The patient denies abdominal pain, fevers, chills or dysuria.  She denies vaginal bleeding, vaginal discharge or lower abdominal cramping.  The patient states that she has had similar symptoms in prior pregnancies.  The patient was seen in the emergency room and noted to be dehydrated with a mild metabolic acidosis.  We were asked to admit the patient to the observation unit for IV hydration and IV antiemetics and consult OB.    General : 28-year-old patient is awake alert and oriented  HEENT: NCAT  CV: Heart is regular with no murmurs  Respiratory: CTA bilaterally  Abd: Soft and nontender  Ext: No acute abnormalities  Skin: No rash  Neuro: Cranial nerves II through XII grossly intact as tested.  No acute lateralizing deficits.  Psych: Normal mood and affect    Plan: We will admit the patient for observation, check fetal heart tones, treat her with Zofran and likely just and place her on D5 half-normal saline.  We will consult OB to see the patient.  Currently in the room the patient states she feels better and understands and agrees with the plan.                   [facility]   HISTORY AND PHYSICAL    Patient Name: Osbaldo Mayo  : 1995  MRN: [id number]  Primary Care Physician:  Provider, No Known  Date of admission: 11/3/2023    Subjective  Subjective     Chief Complaint:   Chief Complaint   Patient presents with    Vomiting During Pregnancy         HPI:    Osbaldo Mayo is a pleasant afebrile ambulatory 28 y.o. black female with a past medical  history of hyperemesis gravidarum, anemia and hypoglycemia.    She presents to the emergency department at [facility] today with complaint of nausea and vomiting.  She has been admitted to the ED observation unit for further testing and evaluation.    Patient reports that she is currently approximately 10 weeks pregnant.  She states she follows with Dr. Cronin with [facility].  She advises that she has had nonbilious nonbloody emesis since approximately 2100 last evening.  She has taken Zofran and Diclegis which have worked with prior pregnancies but does not seem to be getting any relief.  She endorses some nausea but denies any abdominal pain, fever, chills, dysuria or urinary frequency.  She denies any vaginal bleeding or abnormal vaginal discharge.  States she has not had a prenatal appointment with her OB/GYN for this pregnancy yet.    OB History:   Para Term  AB Living   5 3 3 1 3     Review of Systems   All systems were reviewed and negative except for: What is mentioned above    Personal History     Past Medical History:   Diagnosis Date    Anemia     Hypoglycemia        No past surgical history on file.    Family History: family history includes Asthma in her brother and sister; Cancer in her maternal grandfather and another family member; Diabetes in her mother and another family member; Hypertension in her mother and another family member; No Known Problems in her father, maternal aunt, maternal grandmother, maternal uncle, paternal aunt, paternal grandfather, paternal grandmother, and paternal uncle. Otherwise pertinent FHx was reviewed and not pertinent to current issue.    Social History:  reports that she quit smoking about 6 weeks ago. Her smoking use included cigars. She started smoking about 6 years ago. She has never used smokeless tobacco. She reports current alcohol use. She reports that she does not currently use drugs after having used the following drugs:  Marijuana.    Home Medications:  doxylamine, doxylamine-pyridoxine ER, and ondansetron ODT    Allergies:  No Known Allergies    Objective  Objective     Vitals:   Temp:  [97.5 °F (36.4 °C)] 97.5 °F (36.4 °C)  Heart Rate:  [] 87  Resp:  [16] 16  BP: (112-146)/() 112/73  Physical Exam    Constitutional: Awake, alert   Eyes: PERRLA, sclerae anicteric, no conjunctival injection   HENT: NCAT, mucous membranes are dry and tacky   Neck: Supple, no thyromegaly, no lymphadenopathy, trachea midline   Respiratory: Clear to auscultation bilaterally, nonlabored respirations    Cardiovascular: RRR, no murmurs, rubs, or gallops, palpable pedal pulses bilaterally   Gastrointestinal: Positive bowel sounds, soft, nontender, nondistended   Musculoskeletal: No bilateral ankle edema, no clubbing or cyanosis to extremities   Psychiatric: Anxious affect, cooperative   Neurologic: Oriented x 3, strength symmetric in all extremities, Cranial Nerves grossly intact to confrontation, speech clear   Skin: No rashes     Result Review   Result Review:  I have personally reviewed the results from the time of this admission to 11/3/2023 11:06 EDT and agree with these findings:  [x]  Laboratory list / accordion  []  Microbiology  []  Radiology  []  EKG/Telemetry   []  Cardiology/Vascular   []  Pathology  []  Old records  []  Other:  Most notable findings include: Serum sodium 132, CO2 16.4, urinalysis positive for 80 ketones, protein, 3-5 WBCs, negative for bacteria, 3-6 squamous epithelials      Assessment & Plan  Assessment / Plan     Brief Patient Summary:  Osbaldo Mayo is a 28 y.o. female who is being evaluated for hyperemesis gravidarum    Active Hospital Problems:  Active Hospital Problems    Diagnosis     **Hyperemesis gravidarum      Plan:     Hyperemesis gravidarum  Consult to Ob-Gyn  Prn antiemetics with zofran and diclegis  D5 1/s NS @ 100mL/hr  Fetal heart tones      DVT prophylaxis:  Mechanical DVT prophylaxis orders are  present.    CODE STATUS:    Level Of Support Discussed With: Patient  Code Status (Patient has no pulse and is not breathing): CPR (Attempt to Resuscitate)  Medical Interventions (Patient has pulse or is breathing): Full Support    Admission Status:  I believe this patient meets observation status.    Electronically signed by TIARA Patel, 23, 11:06 AM EDT.        78 minutes has been spent by [facility] providers in the care of this patient while under observation status      I have worn appropriate PPE during this patient encounter, sanitized my hands both with entering and exiting patient's room.             Electronically signed by Yusuf Myrick MD at 23 2158          Physician Progress Notes (last 72 hours)        Stephane Garza MD at 23 1100          [facility]  Osbaldo Mayo  : 1995  MRN: [id number]  CSN: [id number]    Hospital Day: 4        Antepartum Progress Note    Subjective       Osbaldo Mayo is a 28 y.o. female  at 10w2d admitted secondary to ongoing nausea and vomiting consistent with hyperemesis gravidarum.  This morning her potassium is still 3.2 and she was advised that she would be staying for at least 1 additional day while repletion of her electrolytes continues.  She continues to have nausea and vomiting but is slightly improved from yesterday.      Review of Systems      Objective     Min/max vitals past 24 hours:   Temp  Min: 97.9 °F (36.6 °C)  Max: 99.3 °F (37.4 °C)  BP  Min: 98/59  Max: 119/75  Pulse  Min: 67  Max: 86  Resp  Min: 16  Max: 20               General: no acute distress   Heart: Not performed.   Lungs: breathing is unlabored   Abdomen: soft, non-tender; no masses       Cervix: was not checked.   Contractions: none             Recent Results (from the past 24 hour(s))   POC Glucose Once    Collection Time: 23  3:48 PM    Specimen: Blood   Result Value Ref Range    Glucose 104 70 - 130  mg/dL   Magnesium    Collection Time: 11/05/23  4:43 PM    Specimen: Blood   Result Value Ref Range    Magnesium 1.7 1.6 - 2.6 mg/dL   CBC (No Diff)    Collection Time: 11/05/23  4:43 PM    Specimen: Blood   Result Value Ref Range    WBC 3.64 3.40 - 10.80 10*3/mm3    RBC 4.49 3.77 - 5.28 10*6/mm3    Hemoglobin 11.8 (L) 12.0 - 15.9 g/dL    Hematocrit 36.5 34.0 - 46.6 %    MCV 81.3 79.0 - 97.0 fL    MCH 26.3 (L) 26.6 - 33.0 pg    MCHC 32.3 31.5 - 35.7 g/dL    RDW 14.8 12.3 - 15.4 %    RDW-SD 43.9 37.0 - 54.0 fl    MPV 10.6 6.0 - 12.0 fL    Platelets 241 140 - 450 10*3/mm3   Type & Screen    Collection Time: 11/05/23  4:43 PM    Specimen: Blood   Result Value Ref Range    ABO Type A     RH type Negative     Antibody Screen Negative     T&S Expiration Date 11/8/2023 11:59:59 PM    Comprehensive Metabolic Panel    Collection Time: 11/05/23  4:43 PM    Specimen: Blood   Result Value Ref Range    Glucose 102 (H) 65 - 99 mg/dL    BUN <2 (L) 6 - 20 mg/dL    Creatinine 0.40 (L) 0.57 - 1.00 mg/dL    Sodium 134 (L) 136 - 145 mmol/L    Potassium 3.2 (L) 3.5 - 5.2 mmol/L    Chloride 104 98 - 107 mmol/L    CO2 22.0 22.0 - 29.0 mmol/L    Calcium 9.1 8.6 - 10.5 mg/dL    Total Protein 6.4 6.0 - 8.5 g/dL    Albumin 3.6 3.5 - 5.2 g/dL    ALT (SGPT) 7 1 - 33 U/L    AST (SGOT) 6 1 - 32 U/L    Alkaline Phosphatase 68 39 - 117 U/L    Total Bilirubin 0.2 0.0 - 1.2 mg/dL    Globulin 2.8 gm/dL    A/G Ratio 1.3 g/dL    BUN/Creatinine Ratio      Anion Gap 8.0 5.0 - 15.0 mmol/L    eGFR 136.7 >60.0 mL/min/1.73   Comprehensive Metabolic Panel    Collection Time: 11/06/23  3:35 AM    Specimen: Blood   Result Value Ref Range    Glucose 109 (H) 65 - 99 mg/dL    BUN <2 (L) 6 - 20 mg/dL    Creatinine 0.40 (L) 0.57 - 1.00 mg/dL    Sodium 136 136 - 145 mmol/L    Potassium 3.3 (L) 3.5 - 5.2 mmol/L    Chloride 107 98 - 107 mmol/L    CO2 20.5 (L) 22.0 - 29.0 mmol/L    Calcium 8.7 8.6 - 10.5 mg/dL    Total Protein 5.9 (L) 6.0 - 8.5 g/dL    Albumin 3.0 (L)  3.5 - 5.2 g/dL    ALT (SGPT) 6 1 - 33 U/L    AST (SGOT) 10 1 - 32 U/L    Alkaline Phosphatase 59 39 - 117 U/L    Total Bilirubin 0.2 0.0 - 1.2 mg/dL    Globulin 2.9 gm/dL    A/G Ratio 1.0 g/dL    BUN/Creatinine Ratio      Anion Gap 8.5 5.0 - 15.0 mmol/L    eGFR 136.7 >60.0 mL/min/1.73   Magnesium    Collection Time: 11/06/23  3:35 AM    Specimen: Blood   Result Value Ref Range    Magnesium 1.6 1.6 - 2.6 mg/dL                  Assessment   IUP at 10w2d  Hyperemesis gravidarum with potassium of 3.2 and continuing repletion of electrolytes    Plan   Continue current management possible discharge home tomorrow if her electrolytes are within the normal range and her nausea vomiting is adequately controlled.    Stephane Garza MD  11/6/2023  11:01 [facility]  OB Hospitalist  Phone:  [phone]    Electronically signed by Stephane Garza MD at 11/06/23 1108       Sharri Bain PA at 11/05/23 1302       Attestation signed by Siddharth Hua MD at 11/05/23 1921    The ANSLEY and I have discussed this patients history, physical exam, and treatment plan. I have reviewed the documentation and personally had a face to face interaction with the patient. I affirm the documentation and agree with the treatment and plan.  The separate note describes my personal findings                  ED OBSERVATION PROGRESS/DISCHARGE SUMMARY    Date of Admission: 11/3/2023   LOS: 0 days   PCP: Provider, No Known    Subjective   No acute events overnight.  No episodes of vomiting noted.  Patient tolerating IV potassium.     Hospital Outcome:   28-year-old female, 10 weeks pregnant, admitted to the observation unit with a complaint of hyperemesis.  In the emergency department lab work shows sodium of 132, CO2 16.4, anion gap 17.6, BUN 4, creatinine 0.47 remainder of her lab work is unremarkable.  Urinalysis shows ketones of 80 mg/dL, protein 30 mg/dL, 3-5 WBCs, and 3-6 squamous cells.  UDS is negative.  D5 0.45% normal saline was started  in the emergency department after a bolus of 1 L normal saline.     11/4: Patient was seen by OB/GYN this morning, Dr. Kang.  Alternating IV Zofran with LA Phenergan.  Patient also has low potassium, she has been unable to tolerate potassium runs in her peripheral IVs due to burning.  Therefore, she is receiving IV fluids with D5, half-normal saline, 20 milequivalents of KCl at 125 mL/h.  Patient n.p.o. except sips with meds and ice chips throughout day today.  We will observe tonight and advance diet in the morning and see how patient does.     11/5: IV site change and patient is tolerating K runs without difficulty.  2 K runs given overnight.  Potassium this morning 3.4.  Patient continues to complain of nausea.  States that she vomited overnight.  Woke up around 4 AM with severe heartburn.  Patient was refusing Phenergan overnight because she states it makes her feel like a zombie.  Discussed with OB/GYN, Dr. Owusu.  Unfortunately patient not well enough to go home as she is not tolerating p.o. she needs to be without emesis for 24 hours.  Keep NPO. We will admit to obstetrics service, Dr. Owusu has graciously accepted.  Discussed with patient who expresses understanding and is in agreement with plan.     ROS:  General: no fevers, chills  Respiratory: no cough, dyspnea  Cardiovascular: no chest pain, palpitations  Abdomen: No abdominal pain, nausea, vomiting, or diarrhea  Neurologic: No focal weakness     Objective   Physical Exam:  I have reviewed the vital signs.  Temp:  [98.2 °F (36.8 °C)-99.3 °F (37.4 °C)] 98.2 °F (36.8 °C)  Heart Rate:  [71-93] 72  Resp:  [16] 16  BP: ()/(62-66) 105/62  General Appearance:    Sleeping but easily aroused   Head:     Normocephalic, atraumatic  Eyes:     Sclerae anicteric  Neck:     Supple, no mass  Lungs: Clear to auscultation bilaterally, respirations unlabored  Heart: Regular rate and rhythm, S1 and S2 normal, no murmur, rub or gallop  Abdomen:  Soft, non-tender,  bowel sounds active, nondistended  Extremities: No clubbing, cyanosis, or edema to lower extremities  Pulses:  2+ and symmetric in distal lower extremities  Skin: No rashes   Neurologic: Oriented x3, Normal strength to extremities     Results Review:    I have reviewed the labs, radiology results and diagnostic studies.          Results from last 7 days   Lab Units 11/04/23  0458   WBC 10*3/mm3 3.59   HEMOGLOBIN g/dL 11.5*   HEMATOCRIT % 34.8   PLATELETS 10*3/mm3 227             Results from last 7 days   Lab Units 11/04/23  2117 11/04/23  0458 11/03/23  0801   SODIUM mmol/L  --  133* 132*   POTASSIUM mmol/L 3.0* 3.0* 4.2   CHLORIDE mmol/L  --  105 98   CO2 mmol/L  --  19.3* 16.4*   BUN mg/dL  --  <2* 4*   CREATININE mg/dL  --  0.36* 0.47*   CALCIUM mg/dL  --  8.6 9.8   BILIRUBIN mg/dL  --   --  0.4   ALK PHOS U/L  --   --  79   ALT (SGPT) U/L  --   --  12   AST (SGOT) U/L  --   --  29   GLUCOSE mg/dL  --  130* 98      Imaging Results (Last 24 Hours)         ** No results found for the last 24 hours. **                I have reviewed the medications.  ---------------------------------------------------------------------------------------------  Assessment & Plan   Assessment/Problem List    Hyperemesis gravidarum        Plan:     Hyperemesis gravidarum  -Consult to Ob-Gyn  -Prn antiemetics with zofran and diclegis  -D5 1/2 NS with 20 milequivalents KCl at 120mL/hr  -Fetal heart tones  -Alternate IV Zofran with OH Phenergan every 4 hours  -N.p.o.  -Admit to obstetrics, Dr. Owusu     Hypokalemia  -Potassium 3.0 this morning  -Patient tolerated  K runs x 2  -IV fluids with 20 milequivalents Kcl  -Patient potassium improving, 3.4 this morning, will switch patient back to D5 half-normal saline as do not create hyperkalemic issue  -Per Dr. Owusu, okay to discontinue telemetry monitoring        Disposition: Admit to obstetrics, Dr. Owusu     This note will serve as a transfer summary/progress note     Sharri Bain,  PA 23 07:53 EST     I have worn appropriate PPE during this patient encounter, sanitized my hands both with entering and exiting patient's room.        47 minutes has been spent by [facility] providers in the care of this patient while under observation status          Electronically signed by Siddharth Hua MD at 23 1921       Jaycee Owusu MD at 23 1235          [facility]  Osbaldo Mayo  : 1995  MRN: [id number]  CSN: [id number]    OB Progress Note    Subjective   Chief Complaint   Patient presents with    Vomiting During Pregnancy     Osbaldo Mayo is a 28 y.o. year old  with an Estimated Date of Delivery: 24 currently at 10w1d presenting with ongoing hyperemesis gravidarum. She vomited repeatedly overnight and this morning after refusing phenergan because she doesn't like the way it makes her feel. The palpitations she was having yesterday have resolved.     Prenatal care has not been established.  It has been complicated by hyperemesis gravidarum.    OB History    Para Term  AB Living   1 0 0 0 0 0   SAB IAB Ectopic Molar Multiple Live Births   0 0 0 0 0 0      # Outcome Date GA Lbr Wm/2nd Weight Sex Delivery Anes PTL Lv   1 Current              Past Medical History:   Diagnosis Date    Anemia     Hypoglycemia      No past surgical history on file.    Current Facility-Administered Medications:     dextrose 5 % and sodium chloride 0.45 % with KCl 20 mEq/L infusion, 125 mL/hr, Intravenous, Continuous, Sharri Bain PA, Last Rate: 125 mL/hr at 23 1114, 125 mL/hr at 23 1114    doxylamine-pyridoxine 25-25 mg combo dose, , Oral, Nightly, Sharri Bain PA, Given at 23    famotidine (PEPCID) injection 20 mg, 20 mg, Intravenous, Daily, Cherelle Panda APRN, 20 mg at 23 0421    ondansetron (ZOFRAN) injection 8 mg, 8 mg, Intravenous, Q8H, Sharri Bain PA, 8 mg at 23 0584     "promethazine (PHENERGAN) suppository 25 mg, 25 mg, Rectal, Q8H, Venice Kang MD, 25 mg at 23 1016    sodium chloride 0.9 % flush 10 mL, 10 mL, Intravenous, Q12H, Herleila Joselyn, APRN, 10 mL at 23 0931    sodium chloride 0.9 % flush 10 mL, 10 mL, Intravenous, PRN, Herth, Joselyn, APRN    sodium chloride 0.9 % infusion 40 mL, 40 mL, Intravenous, PRN, Herth, Joselyn, APRN    No Known Allergies  Social History    Tobacco Use      Smoking status: Former        Types: Cigars        Start date:         Quit date: 2023        Years since quittin.1      Smokeless tobacco: Never      Tobacco comments: Smoke 2 cigars a day.    Review of Systems   Gastrointestinal:  Positive for nausea and vomiting.        Objective   BP 96/57 (BP Location: Left arm, Patient Position: Lying)   Pulse 77   Temp 98.8 °F (37.1 °C) (Oral)   Resp 17   Ht 172.7 cm (68\")   Wt 79.4 kg (175 lb)   LMP 2023 (Exact Date)   SpO2 100%   BMI 26.61 kg/m²   General: well developed; well nourished  mildly distressed   Abdomen: soft, non-tender; no masses  gravid    FHT's: Not assessed      Cervix: was not checked.   Presentation: Unable to appreciate   Contractions: Not monitored   Chest: Unlabored respirations    CV:  RRR   Ext:   No C/C/E   Back: CVA tenderness is deferred bilateral        Prenatal Labs  Lab Results   Component Value Date    HGB 11.5 (L) 2023    HEPBSAG Nonreactive 2020    ABORH A Negative 2023    ABSCRN Positive 2023    QIC2LXJ1 Nonreactive 2023    HEPCVIRUSABY Nonreactive 2020       Current Labs Reviewed   CBC w/ diff:   Lab Results   Component Value Date    WBC 3.59 2023    NEUTRORELPCT 50.7 2023    AUTOIGPER 0.3 2023    LYMPHORELPCT 37.0 2023    MONORELPCT 10.0 2023    EOSRELPCT 1.7 2023    BASORELPCT 0.3 2023    HGB 11.5 (L) 2023    HCT 34.8 2023    MCV 79.3 2023    RDW 14.0 2023     2023 "     CMP:   Lab Results   Component Value Date     11/05/2023    K 3.4 (L) 11/05/2023     (H) 11/05/2023    CO2 21.0 (L) 11/05/2023    BUN <2 (L) 11/05/2023    CREATININE 0.38 (L) 11/05/2023    GLUCOSE 110 (H) 11/05/2023    ALBUMIN 4.2 11/03/2023    CALCIUM 8.6 11/05/2023    AST 29 11/03/2023    ALT 12 11/03/2023    BILITOT 0.4 11/03/2023         Assessment   IUP at 10w1d  Hyperemesis gravidarum- still vomiting this am after phenergan refusal.  Hypokalemia- secondary to GI losses. Currently repleting.    Plan   Admit for further fluids, K+ repletion. I offered her reglan rather than phenergan to help get a handle on her emesis but she states she soils herself with reglan. Given her ongoing issues with emesis, she opts to resume phenergan in conjunction with zofran. Hold NPO. Will need to make full admit due to 48 hours in observation unit.    Jaycee Owusu MD  11/5/2023  12:35 EST      Electronically signed by Jaycee Owusu MD at 11/05/23 1248       Siddharth Hua MD at 11/05/23 1043          MD ATTESTATION NOTE    The ANSLEY and I have discussed this patient's history, physical exam, and treatment plan.  I have reviewed the documentation and personally had a face to face interaction with the patient. I affirm the documentation and agree with the treatment and plan.  The attached note describes my personal findings.      I provided a substantive portion of the care of the patient.  I personally performed the physical exam in its entirety, and below are my findings.      Brief HPI: This patient is a 28-year-old female who is currently approximately 10 weeks pregnant admitted to the observation unit due to hyperemesis gravidarum.  The patient reports that she had similar issues with a previous pregnancy and ended up hospitalized for several days with IV fluids and Zofran infusion.  She currently complains of generalized weakness as well as ongoing nausea.  She denies abdominal pain, fever/chills,  chest pain, or shortness of breath.      PHYSICAL EXAM  ED Triage Vitals   Temp Heart Rate Resp BP SpO2   11/03/23 0731 11/03/23 0731 11/03/23 0731 11/03/23 0733 11/03/23 0731   97.5 °F (36.4 °C) (!) 150 16 (!) 146/114 95 %      Temp src Heart Rate Source Patient Position BP Location FiO2 (%)   11/03/23 1258 11/03/23 1258 11/03/23 1258 11/03/23 1258 --   Oral Monitor Sitting Right arm        GENERAL: Resting comfortably and in no acute distress, nontoxic in appearance  HENT: nares patent  EYES: no scleral icterus  CV: regular rhythm, normal rate, no M/R/G  RESPIRATORY: normal effort, lungs clear bilaterally  ABDOMEN: soft, nontender, no rebound or guarding  MUSCULOSKELETAL: no deformity, no edema  NEURO: alert, moves all extremities, follows commands  PSYCH:  calm, cooperative  SKIN: warm, dry    Vital signs and nursing notes reviewed.        Plan: We will continue to treat the patient with IV fluids as well as IV antiemetics for symptomatic treatment.  OB/GYN will continue to consult on the patient for further treatment planning and ultimate disposition.      Electronically signed by Siddharth Hua MD at 11/05/23 1050       Sharri Bain PA at 11/04/23 1757       Attestation signed by Poncho Ruvalcaba MD at 11/05/23 1104      MD Attestation Note    I supervised care provided by the midlevel provider.    The ANSLEY and I have discussed this patient's history, physical exam, and treatment plan.   I provided a substantive portion of the care of this patient.  I have reviewed the documentation and personally had a face to face interaction with the patient  I affirm the documentation and agree with the treatment and plan.   My personal findings are documented in a separate note.                     ED OBSERVATION PROGRESS/DISCHARGE SUMMARY    Date of Admission: 11/3/2023   LOS: 0 days   PCP: Provider, No Known    Subjective patient reports feeling drained throughout day today, states she still feels  nauseous    Hospital Outcome:   28-year-old female, 10 weeks pregnant, admitted to the observation unit with a complaint of hyperemesis.  In the emergency department lab work shows sodium of 132, CO2 16.4, anion gap 17.6, BUN 4, creatinine 0.47 remainder of her lab work is unremarkable.  Urinalysis shows ketones of 80 mg/dL, protein 30 mg/dL, 3-5 WBCs, and 3-6 squamous cells.  UDS is negative.  D5 0.45% normal saline was started in the emergency department after a bolus of 1 L normal saline.    11/4: Patient was seen by OB/GYN this morning, Dr. Kang.  Alternating IV Zofran with [place] Phenergan.  Patient also has low potassium, she has been unable to tolerate potassium runs in her peripheral IVs due to burning.  Therefore, she is receiving IV fluids with D5, half-normal saline, 20 milequivalents of KCl at 125 mL/h.  Patient n.p.o. except sips with meds and ice chips throughout day today.  We will observe tonight and advance diet in the morning and see how patient does.    ROS:  General: no fevers, chills  Respiratory: no cough, dyspnea  Cardiovascular: no chest pain, palpitations  Abdomen: No abdominal pain, nausea, vomiting, or diarrhea  Neurologic: No focal weakness    Objective   Physical Exam:  I have reviewed the vital signs.  Temp:  [97.4 °F (36.3 °C)-98.6 °F (37 °C)] 98.2 °F (36.8 °C)  Heart Rate:  [] 86  Resp:  [16-18] 16  BP: (109-146)/() 121/81  General Appearance:    Alert, cooperative, no distress  Head:    Normocephalic, atraumatic  Eyes:    Sclerae anicteric  Neck:   Supple, no mass  Lungs: Clear to auscultation bilaterally, respirations unlabored  Heart: Regular rate and rhythm, S1 and S2 normal, no murmur, rub or gallop  Abdomen:  Soft, non-tender, bowel sounds active, nondistended  Extremities: No clubbing, cyanosis, or edema to lower extremities  Pulses:  2+ and symmetric in distal lower extremities  Skin: No rashes   Neurologic: Oriented x3, Normal strength to extremities    Results  Review:    I have reviewed the labs, radiology results and diagnostic studies.    Results from last 7 days   Lab Units 23  0801   WBC 10*3/mm3 5.01   HEMOGLOBIN g/dL 13.5   HEMATOCRIT % 41.3   PLATELETS 10*3/mm3 248     Results from last 7 days   Lab Units 23  0801   SODIUM mmol/L 132*   POTASSIUM mmol/L 4.2   CHLORIDE mmol/L 98   CO2 mmol/L 16.4*   BUN mg/dL 4*   CREATININE mg/dL 0.47*   CALCIUM mg/dL 9.8   BILIRUBIN mg/dL 0.4   ALK PHOS U/L 79   ALT (SGPT) U/L 12   AST (SGOT) U/L 29   GLUCOSE mg/dL 98     Imaging Results (Last 24 Hours)       ** No results found for the last 24 hours. **            I have reviewed the medications.  ---------------------------------------------------------------------------------------------  Assessment & Plan   Assessment/Problem List    Hyperemesis gravidarum      Plan:    Hyperemesis gravidarum  -Consult to Ob-Gyn  -Prn antiemetics with zofran and diclegis  -D5 1/2 NS with 20 milequivalents KCl at 120mL/hr  -Fetal heart tones  -Alternate IV Zofran with [place] Phenergan every 4 hours  -N.p.o. except ice chips, advance diet in a.m.    Hypokalemia  -Potassium 3.0 this morning  -Patient unable to tolerate K runs and peripheral IV due to burning  -IV fluids with 20 milequivalents Kcl  -Telemetry monitoring     Disposition: Anticipate patient will be discharged home tomorrow    This note will serve as progress note    STEPHEN Rice 23 08:04 EDT    I have worn appropriate PPE during this patient encounter, sanitized my hands both with entering and exiting patient's room.      52 minutes has been spent by [facility] providers in the care of this patient while under observation status            Electronically signed by Poncho Ruvalcaba MD at 23 [phone]       Venice Kang MD at 23 0911          [facility]  Osbaldo Mayo  : 1995  MRN: [id number]  CSN: [id number]    Hospital Day: 2    CC: hospital follow-up for  hyperemesis gravidarum    Progress Note    Subjective   The patient is a 28 year old  at 10 weeks admitted for hyperemesis.  The patient states that she has lost 30 pounds during this pregnancy and has had severe hyperemesis with her previous two pregnancies as well.  The patient is scheduled for an initial prenatal appointment with Dr. Vazquez at [place].    The patient reports repeatedly vomiting overnight.  She has vomited four times in the past three hours.  Her potassium yesterday was 4.2.  Her potassium this morning is 3.0.  Patient reports feeling palpitations.  She denies any abdominal cramping or bleeding.    Review of Systems      Objective     Min/max vitals past 24 hours:   Temp  Min: 97.4 °F (36.3 °C)  Max: 98.6 °F (37 °C)  BP  Min: 109/70  Max: 127/87  Pulse  Min: 78  Max: 95  Resp  Min: 16  Max: 18         General: well developed; well nourished   Heart: regular rate and rhythm, S1, S2 normal, no murmur, click, rub or gallop   Lungs: breathing is unlabored   Abdomen: soft, non-tender; no masses       Cervix: was not checked.                  Assessment   IUP at 10w0d  Hyperemesis gravidarum  Hypokalemia  Palpitations    Plan   NPO, bowel rest  Potassium replacement.  Patient unable to tolerate oral intake so she will need IV potassium.  Patient initially concerned that it will burn going through the IV in her hand.  Advised patient that it will probably burn but it is the best way to replace her potassium since she is still vomiting.  Recheck electrolytes in am.  Telemetry was requested due to complaint of palpitations and IV potassium replacement.  Ordered Zofran IV 8 mg and Phenergan 25 mg suppositories.  These should alternated so that one medication is given every 4 hours.  Her previous regimen was not this aggressive because her potassium level was normal at the time of admission and her vomiting was less severe  Pepcid 20 mg IV daily.  Consider a steroid taper if these interventions are not  effective.              Venice Kang MD  11/4/2023  09:12 EDT           Electronically signed by Venice Kang MD at 11/04/23 0929       Poncho Ruvalcaba MD at 11/04/23 0827          MD Attestation Note    I supervised care provided by the midlevel provider.    The ANSLEY and I have discussed this patient's history, physical exam, and treatment plan. I have reviewed the documentation and personally had a face to face interaction with the patient  I affirm the documentation and agree with the treatment and plan.     I provided a substantive portion of the care of the patient.  I personally performed the physical exam in its entirety, and below are my findings.        Subjective  Pt is a 28 y.o. female admitted from Emergency Department for evaluation and treatment of persistent vomiting in pregnancy.  Patient roughly 10 weeks pregnant with history of similar spells in the past.  Unfortunately she remains nauseous despite antacids, antiemetics and IV fluids.    Physical Exam  GENERAL: Alert and in mild distress with occasional vomiting, Vitals reviewed  HENT: nares patent  EYES: no scleral icterus  CV: regular rhythm, regular rate  RESPIRATORY: normal effort  ABDOMEN: soft, mild epigastric tenderness  MUSCULOSKELETAL: no deformity  NEURO: Strength sensation and coordination are grossly intact.  Speech and mentation are unremarkable  SKIN: warm, dry    Assessment/Plan  I discussed tx and evaluation of this patient with STEPHEN Bain.  Appreciate consultation from OB hospitalist Dr. Owusu.  Continue symptomatic treatment with IV fluids, antacids and IV Zofran.  Advance diet as tolerated.        Electronically signed by Poncho Ruvalcaba MD at 11/04/23 0840          Consult Notes (last 72 hours)        Jaycee Owusu MD at 11/03/23 1839        Consult Orders    1. Inpatient Obstetrics / Gynecology Consult [[id number]] ordered by Joselyn Willis APRN at 11/03/23 1059                  Megha Roblero  Gael  : 1995  MRN: [id number]  [facility]: [id number]    OB Consult    Subjective   Chief Complaint   Patient presents with    Vomiting During Pregnancy     Osbaldo Mayo is a 28 y.o. year old  with an Estimated Date of Delivery: 24 currently at 9w6d presenting with ongoing issues with hyperemesis. She was seen in the ER several weeks ago for similar complaints at which time she was noted to be hypokalemic. Her emesis responds to zofran. HG has complicated her other 2 pregnancies.  She has an appointment scheduled with Dr. Vazquez at [place] in several weeks for her 1st OB visit. She denies any significant marijuana use, only using once several days ago. Her emesis has improved since hydration and anti-emetics although she is still noting some nausea.    Prenatal care has been with none.  It has been complicated by hyperemesis gravidarum.    OB History    Para Term  AB Living   1 0 0 0 0 0   SAB IAB Ectopic Molar Multiple Live Births   0 0 0 0 0 0      # Outcome Date GA Lbr Wm/2nd Weight Sex Delivery Anes PTL Lv   1 Current              Past Medical History:   Diagnosis Date    Anemia     Hypoglycemia      No past surgical history on file.    Current Facility-Administered Medications:     dextrose 5 % and sodium chloride 0.45 % infusion, 100 mL/hr, Intravenous, Continuous, Joselyn Willis APRN, Last Rate: 100 mL/hr at 23 1333, 100 mL/hr at 23 1333    doxylamine-pyridoxine 25-25 mg combo dose, , Oral, BID PRN, Joselyn Willis, APRN, Given at 23 1333    ondansetron (ZOFRAN) tablet 4 mg, 4 mg, Oral, Q6H PRN **OR** ondansetron (ZOFRAN) injection 4 mg, 4 mg, Intravenous, Q6H PRN, Joselyn Willis APRN, 4 mg at 23 1726    sodium chloride 0.9 % flush 10 mL, 10 mL, Intravenous, Q12H, Joselyn Willis, APRN, 10 mL at 23 1254    sodium chloride 0.9 % flush 10 mL, 10 mL, Intravenous, PRN, Joselyn Willis, APRN    sodium chloride 0.9 % infusion 40 mL, 40 mL, Intravenous, PRN, [place],  "TIARA Alves    No Known Allergies  Social History    Tobacco Use      Smoking status: Former        Types: Cigars        Start date:         Quit date: 2023        Years since quittin.1      Smokeless tobacco: Never      Tobacco comments: Smoke 2 cigars a day.    Review of Systems   Gastrointestinal:  Positive for nausea.   All other systems reviewed and are negative.       Objective   /87 (BP Location: Right arm, Patient Position: Sitting)   Pulse 89   Temp 97.4 °F (36.3 °C) (Oral)   Resp 18   Ht 172.7 cm (68\")   Wt 79.4 kg (175 lb)   LMP 2023 (Exact Date)   SpO2 100%   BMI 26.61 kg/m²   General: well developed; well nourished  no acute distress   Abdomen: soft, non-tender; no masses  gravid    FHT's: Too early to monitor      Cervix: was not checked.   Presentation: Unable to appreciate   Contractions: Not monitored   Chest: Unlabored respirations    CV:  RRR   Ext:   No C/C/E   Back: CVA tenderness is deferred bilateral        Prenatal Labs  Lab Results   Component Value Date    HGB 13.5 2023    HEPBSAG Nonreactive 2020    ABORH A Negative 2023    ABSCRN Positive 2023    XNL0ZGA8 Nonreactive 2023    HEPCVIRUSABY Nonreactive 2020       Current Labs Reviewed   CBC w/ diff:   Lab Results   Component Value Date    WBC 5.01 2023    NEUTRORELPCT 66.2 2023    AUTOIGPER 0.2 2023    LYMPHORELPCT 24.4 2023    MONORELPCT 8.2 2023    EOSRELPCT 0.8 2023    BASORELPCT 0.2 2023    HGB 13.5 2023    HCT 41.3 2023    MCV 79.6 2023    RDW 14.0 2023     2023     CMP:   Lab Results   Component Value Date     (L) 2023    K 4.2 2023    CL 98 2023    CO2 16.4 (L) 2023    BUN 4 (L) 2023    CREATININE 0.47 (L) 2023    GLUCOSE 98 2023    ALBUMIN 4.2 2023    CALCIUM 9.8 2023    AST 29 2023    ALT 12 2023    BILITOT 0.4 " 11/03/2023     UA:    Lab Results   Component Value Date    SQUAMEPIUA 3-6 (A) 11/03/2023    SPECGRAVUR 1.024 11/03/2023    KETONESU 80 mg/dL (3+) (A) 11/03/2023    BLOODU Negative 11/03/2023    LEUKOCYTESUR Negative 11/03/2023    NITRITEU Negative 11/03/2023    RBCUA 0-2 11/03/2023    WBCUA 3-5 (A) 11/03/2023    BACTERIA None Seen 11/03/2023         Assessment   IUP at 9w6d  Hyperemesis gravidarum- responding well to IV hydration and zofran    Plan   Continue zofran. Upon d/c, provide Rx for both oral zofran and phenergan suppositories that she can alternate every 3 hours.     Jaycee Owusu MD  11/3/2023  18:39 EDT      Electronically signed by Jaycee Owusu MD at 11/03/23 1854       Maternal Vitals (last 3 days)       Date/Time Temp Pulse Resp BP SpO2 Weight    11/06/23 1215 98.7 (37.1) 79 16 95/55 99 --    11/06/23 1055 -- -- -- -- -- --    Comment rows:    OBSERV: Dr. Garza at bedside to discuss POC at 11/06/23 1055    11/06/23 0800 98.7 (37.1) 79 16 98/59 100 --    11/06/23 0539 97.9 (36.6) 67 16 100/50 -- --    11/06/23 0333 -- -- -- -- -- --    Comment rows:    OBSERV: lab at bedside to colect cmp and magnesium at 11/06/23 0333    11/05/23 2217 98.3 (36.8) 76 18 119/75 100 --    11/05/23 1935 -- 79 -- -- 100 --    11/05/23 1934 98.3 (36.8) 79 18 114/68 100 --    11/05/23 1635 98.2 (36.8) 75 20 108/67 -- --    11/05/23 1616 99.3 (37.4) 79 17 109/62 100 --    11/05/23 1316 -- 86 -- -- -- --    11/05/23 1250 99 (37.2) 78 19 112/60 100 --    11/05/23 0943 98.8 (37.1) 77 17 96/57 100 --    11/05/23 0900 -- 76 -- -- -- --    11/05/23 0334 98.2 (36.8) 72 16 105/62 100 --    11/05/23 0107 99.1 (37.3) 71 16 98/66 100 --    11/04/23 1925 99.3 (37.4) 72 16 107/62 100 --    11/04/23 0905 -- 93 -- -- -- --    11/04/23 0448 98.2 (36.8) 86 16 121/81 100 --    11/03/23 2321 98.6 (37) 78 18 112/73 99 --    11/03/23 1900 98.6 (37) 86 18 115/68 98 --    11/03/23 1258 97.4 (36.3) 89 18 127/87 100 --    11/03/23 1229  -- 83 -- 109/70 97 --    11/03/23 1216 -- 95 -- 111/66 98 --    11/03/23 1159 -- 79 -- 112/69 99 --    11/03/23 1116 -- -- -- -- -- 79.4 (175)    11/03/23 1001 -- 87 16 112/73 96 --    11/03/23 0909 -- 89 -- -- 100 --    11/03/23 0901 -- 80 16 114/71 94 --    11/03/23 0733 -- -- -- 146/114 -- --    11/03/23 0731 97.5 (36.4) 150 16 -- 95 --          FHR (last 3 days)       None          Uterine Activity (last 3 days)       None          Labor Pain (last 3 days)       Date/Time (0-10) Pain Rating: Rest (0-10) Pain Rating: Activity Pain Management Interventions    11/06/23 0800 0 0 --    11/06/23 0647 0 0 --    11/06/23 0539 0 0 --    11/06/23 0050 0 0 --    11/05/23 1952 8 -- see MAR    11/05/23 1705 0 0 --    11/05/23 1651 0 0 --    11/05/23 1600 0 -- --    11/05/23 1420 0 0 --    11/05/23 1316 0 -- --    11/05/23 1224 0 -- --    11/05/23 0930 0 0 --    11/04/23 1925 0 -- --    11/04/23 1400 0 0 see MAR    11/04/23 0800 0 0 --    11/03/23 2325 0 -- --    11/03/23 1940 0 -- --

## 2023-11-07 LAB
ALBUMIN SERPL-MCNC: 3.1 G/DL (ref 3.5–5.2)
ALBUMIN/GLOB SERPL: 1.1 G/DL
ALP SERPL-CCNC: 65 U/L (ref 39–117)
ALT SERPL W P-5'-P-CCNC: 6 U/L (ref 1–33)
ANION GAP SERPL CALCULATED.3IONS-SCNC: 10.4 MMOL/L (ref 5–15)
AST SERPL-CCNC: 8 U/L (ref 1–32)
BILIRUB SERPL-MCNC: 0.2 MG/DL (ref 0–1.2)
BUN SERPL-MCNC: <2 MG/DL (ref 6–20)
BUN/CREAT SERPL: ABNORMAL
CALCIUM SPEC-SCNC: 8.5 MG/DL (ref 8.6–10.5)
CHLORIDE SERPL-SCNC: 104 MMOL/L (ref 98–107)
CO2 SERPL-SCNC: 20.6 MMOL/L (ref 22–29)
CREAT SERPL-MCNC: 0.39 MG/DL (ref 0.57–1)
EGFRCR SERPLBLD CKD-EPI 2021: 137.6 ML/MIN/1.73
GLOBULIN UR ELPH-MCNC: 2.7 GM/DL
GLUCOSE SERPL-MCNC: 112 MG/DL (ref 65–99)
MAGNESIUM SERPL-MCNC: 1.6 MG/DL (ref 1.6–2.6)
POTASSIUM SERPL-SCNC: 3.3 MMOL/L (ref 3.5–5.2)
POTASSIUM SERPL-SCNC: 3.3 MMOL/L (ref 3.5–5.2)
PROT SERPL-MCNC: 5.8 G/DL (ref 6–8.5)
SODIUM SERPL-SCNC: 135 MMOL/L (ref 136–145)

## 2023-11-07 PROCEDURE — 25010000002 POTASSIUM CHLORIDE 10 MEQ/100ML SOLUTION: Performed by: OBSTETRICS & GYNECOLOGY

## 2023-11-07 PROCEDURE — 84132 ASSAY OF SERUM POTASSIUM: CPT | Performed by: OBSTETRICS & GYNECOLOGY

## 2023-11-07 PROCEDURE — 25010000002 ONDANSETRON PER 1 MG: Performed by: PHYSICIAN ASSISTANT

## 2023-11-07 PROCEDURE — 80053 COMPREHEN METABOLIC PANEL: CPT | Performed by: OBSTETRICS & GYNECOLOGY

## 2023-11-07 PROCEDURE — 25010000002 SODIUM CHLORIDE 0.9 % WITH KCL 20 MEQ 20-0.9 MEQ/L-% SOLUTION: Performed by: OBSTETRICS & GYNECOLOGY

## 2023-11-07 PROCEDURE — 83735 ASSAY OF MAGNESIUM: CPT | Performed by: OBSTETRICS & GYNECOLOGY

## 2023-11-07 RX ORDER — FAMOTIDINE 10 MG/ML
20 INJECTION, SOLUTION INTRAVENOUS 2 TIMES DAILY
Status: DISCONTINUED | OUTPATIENT
Start: 2023-11-07 | End: 2023-11-15 | Stop reason: HOSPADM

## 2023-11-07 RX ORDER — SODIUM CHLORIDE AND POTASSIUM CHLORIDE 150; 900 MG/100ML; MG/100ML
125 INJECTION, SOLUTION INTRAVENOUS ONCE
Status: COMPLETED | OUTPATIENT
Start: 2023-11-07 | End: 2023-11-07

## 2023-11-07 RX ORDER — CALCIUM CARBONATE 500 MG/1
2 TABLET, CHEWABLE ORAL 3 TIMES DAILY PRN
Status: DISCONTINUED | OUTPATIENT
Start: 2023-11-07 | End: 2023-11-15 | Stop reason: HOSPADM

## 2023-11-07 RX ADMIN — FAMOTIDINE 20 MG: 10 INJECTION INTRAVENOUS at 20:30

## 2023-11-07 RX ADMIN — ANTACID TABLETS 2 TABLET: 500 TABLET, CHEWABLE ORAL at 17:32

## 2023-11-07 RX ADMIN — PROMETHAZINE HYDROCHLORIDE 25 MG: 25 SUPPOSITORY RECTAL at 09:22

## 2023-11-07 RX ADMIN — ONDANSETRON 8 MG: 2 INJECTION INTRAMUSCULAR; INTRAVENOUS at 17:31

## 2023-11-07 RX ADMIN — ONDANSETRON 8 MG: 2 INJECTION INTRAMUSCULAR; INTRAVENOUS at 09:16

## 2023-11-07 RX ADMIN — Medication 10 ML: at 20:32

## 2023-11-07 RX ADMIN — DOXYLAMINE SUCCINATE: 25 TABLET ORAL at 20:30

## 2023-11-07 RX ADMIN — POTASSIUM CHLORIDE AND SODIUM CHLORIDE 125 ML/HR: 900; 150 INJECTION, SOLUTION INTRAVENOUS at 11:42

## 2023-11-07 RX ADMIN — DEXTROSE AND SODIUM CHLORIDE 125 ML/HR: 5; 450 INJECTION, SOLUTION INTRAVENOUS at 06:01

## 2023-11-07 RX ADMIN — FAMOTIDINE 20 MG: 10 INJECTION INTRAVENOUS at 09:18

## 2023-11-07 RX ADMIN — ONDANSETRON 8 MG: 2 INJECTION INTRAMUSCULAR; INTRAVENOUS at 00:01

## 2023-11-07 RX ADMIN — DEXTROSE AND SODIUM CHLORIDE 125 ML/HR: 5; 450 INJECTION, SOLUTION INTRAVENOUS at 20:27

## 2023-11-07 RX ADMIN — POTASSIUM CHLORIDE 10 MEQ: 7.46 INJECTION, SOLUTION INTRAVENOUS at 00:01

## 2023-11-07 NOTE — PLAN OF CARE
Goal Outcome Evaluation:              Outcome Evaluation: VSS.  Pt has not had any emesis this shift.  Pt reports feeling better today and has tolerated crackers, dry Cheerios, and toast with peanut butter on it.  Pt K+ is still 3.3 after three rounds of K+ runs.  Pt switched to IVFs with 20 mEq of K+.  Pt showered today and was a little shaky afterward, but resolved with rest.  Pt taking scheduled antacids and IV emetics.

## 2023-11-07 NOTE — PROGRESS NOTES
[facility]  Osbaldo Mayo  : 1995  MRN: [id number]  CSN: [id number]    Hospital Day: 5    CC: hospital follow-up for hyperemesis gravidarum with electrolyte disturbance (hypokalemia).    Antepartum Progress Note    Subjective   Patient reports feeling better and having better control of nausea.  Diet was advanced and she is tolerating dry cheerios, water, and juice.  She reports pepcid has helped a lot with acid reflux.      Review of Systems   Constitutional:  Negative for chills, fatigue and fever.   HENT:  Negative for congestion, rhinorrhea and sore throat.    Eyes:  Negative for visual disturbance.   Respiratory: Negative.     Cardiovascular: Negative.    Gastrointestinal:  Positive for nausea. Negative for abdominal pain, constipation, diarrhea and vomiting.   Genitourinary:  Negative for difficulty urinating, dyspareunia, dysuria, flank pain, frequency, genital sores, hematuria, pelvic pain, urgency, vaginal bleeding, vaginal discharge and vaginal pain.   Neurological:  Negative for dizziness, seizures, light-headedness and headaches.   Psychiatric/Behavioral:  Negative for sleep disturbance. The patient is not nervous/anxious.           Objective     Min/max vitals past 24 hours:   Temp  Min: 97.8 °F (36.6 °C)  Max: 98.9 °F (37.2 °C)  BP  Min: 99/64  Max: 113/62  Pulse  Min: 68  Max: 90  Resp  Min: 16  Max: 18         General: well developed; well nourished  no acute distress   Heart: Not performed.   Lungs: breathing is unlabored   Abdomen: soft, non-tender; no masses  no umbilical or inguinal hernias are present  no hepato-splenomegaly   FHT's: Normal doptones   Cervix: was not checked.   Contractions: none               Assessment   IUP at 10w3d  Hyperemesis gravidarum with electrolyte disturbance- potassium still decreased to 3.3 this morning.     Plan   Plan to give banana bag with 20 mEq of potassium chloride.  Advance diet as tolerated.  Continue antiemetic/antireflux  regimen  Anticipate discharge tomorrow              Rosaura Zavala MD  11/7/2023  15:09 EST

## 2023-11-07 NOTE — PLAN OF CARE
Problem: Adult Inpatient Plan of Care  Goal: Plan of Care Review  Outcome: Ongoing, Progressing  Flowsheets (Taken 11/7/2023 0515)  Outcome Evaluation: vss, n/v still happening not really having any other issues/ did c/o of heartburn tonight which was tx  Goal: Patient-Specific Goal (Individualized)  Outcome: Ongoing, Progressing  Goal: Absence of Hospital-Acquired Illness or Injury  Outcome: Ongoing, Progressing  Goal: Optimal Comfort and Wellbeing  Outcome: Ongoing, Progressing  Intervention: Provide Person-Centered Care  Recent Flowsheet Documentation  Taken 11/6/2023 2015 by Racheal Barnett, RN  Trust Relationship/Rapport: care explained  Goal: Readiness for Transition of Care  Outcome: Ongoing, Progressing     Problem: Nausea and Vomiting  Goal: Fluid and Electrolyte Balance  Outcome: Ongoing, Progressing     Problem: Hyperemesis Gravidarum  Goal: Nausea and Vomiting Relief  Outcome: Ongoing, Progressing     Problem: Maternal-Fetal Wellbeing  Goal: Optimal Maternal-Fetal Wellbeing  Outcome: Ongoing, Progressing     Problem: Fall Injury Risk  Goal: Absence of Fall and Fall-Related Injury  Outcome: Ongoing, Progressing     Problem: VTE (Venous Thromboembolism)  Goal: VTE (Venous Thromboembolism) Symptom Resolution  Outcome: Ongoing, Progressing     Problem: Oral Intake Inadequate  Goal: Improved Oral Intake  Outcome: Ongoing, Progressing   Goal Outcome Evaluation:              Outcome Evaluation: vss, n/v still happening not really having any other issues/ did c/o of heartburn tonight which was tx

## 2023-11-08 LAB
ALBUMIN SERPL-MCNC: 3.3 G/DL (ref 3.5–5.2)
ALBUMIN/GLOB SERPL: 1.3 G/DL
ALP SERPL-CCNC: 65 U/L (ref 39–117)
ALT SERPL W P-5'-P-CCNC: 7 U/L (ref 1–33)
ANION GAP SERPL CALCULATED.3IONS-SCNC: 7 MMOL/L (ref 5–15)
AST SERPL-CCNC: 8 U/L (ref 1–32)
BILIRUB SERPL-MCNC: 0.2 MG/DL (ref 0–1.2)
BUN SERPL-MCNC: <2 MG/DL (ref 6–20)
BUN/CREAT SERPL: ABNORMAL
CALCIUM SPEC-SCNC: 8.8 MG/DL (ref 8.6–10.5)
CHLORIDE SERPL-SCNC: 103 MMOL/L (ref 98–107)
CO2 SERPL-SCNC: 23 MMOL/L (ref 22–29)
CREAT SERPL-MCNC: 0.43 MG/DL (ref 0.57–1)
EGFRCR SERPLBLD CKD-EPI 2021: 134.4 ML/MIN/1.73
GLOBULIN UR ELPH-MCNC: 2.5 GM/DL
GLUCOSE SERPL-MCNC: 104 MG/DL (ref 65–99)
POTASSIUM SERPL-SCNC: 3.2 MMOL/L (ref 3.5–5.2)
PROT SERPL-MCNC: 5.8 G/DL (ref 6–8.5)
SODIUM SERPL-SCNC: 133 MMOL/L (ref 136–145)

## 2023-11-08 PROCEDURE — 25010000002 DIPHENHYDRAMINE PER 50 MG: Performed by: OBSTETRICS & GYNECOLOGY

## 2023-11-08 PROCEDURE — 25010000002 ONDANSETRON PER 1 MG: Performed by: PHYSICIAN ASSISTANT

## 2023-11-08 PROCEDURE — 25010000002 POTASSIUM CHLORIDE 10 MEQ/100ML SOLUTION: Performed by: OBSTETRICS & GYNECOLOGY

## 2023-11-08 PROCEDURE — 25010000002 PROMETHAZINE PER 50 MG: Performed by: OBSTETRICS & GYNECOLOGY

## 2023-11-08 PROCEDURE — 80053 COMPREHEN METABOLIC PANEL: CPT | Performed by: OBSTETRICS & GYNECOLOGY

## 2023-11-08 RX ORDER — PROMETHAZINE HYDROCHLORIDE 25 MG/1
25 TABLET ORAL EVERY 8 HOURS
Status: DISCONTINUED | OUTPATIENT
Start: 2023-11-08 | End: 2023-11-08

## 2023-11-08 RX ORDER — PROMETHAZINE HYDROCHLORIDE 25 MG/1
25 SUPPOSITORY RECTAL EVERY 8 HOURS
Status: DISCONTINUED | OUTPATIENT
Start: 2023-11-08 | End: 2023-11-08

## 2023-11-08 RX ORDER — DIPHENHYDRAMINE HYDROCHLORIDE 50 MG/ML
25 INJECTION INTRAMUSCULAR; INTRAVENOUS EVERY 6 HOURS
Status: DISCONTINUED | OUTPATIENT
Start: 2023-11-08 | End: 2023-11-15 | Stop reason: HOSPADM

## 2023-11-08 RX ORDER — POTASSIUM CHLORIDE 7.45 MG/ML
10 INJECTION INTRAVENOUS
Status: COMPLETED | OUTPATIENT
Start: 2023-11-08 | End: 2023-11-08

## 2023-11-08 RX ADMIN — ONDANSETRON 8 MG: 2 INJECTION INTRAMUSCULAR; INTRAVENOUS at 00:03

## 2023-11-08 RX ADMIN — DEXTROSE AND SODIUM CHLORIDE 125 ML/HR: 5; 450 INJECTION, SOLUTION INTRAVENOUS at 21:53

## 2023-11-08 RX ADMIN — POTASSIUM CHLORIDE 10 MEQ: 7.46 INJECTION, SOLUTION INTRAVENOUS at 12:58

## 2023-11-08 RX ADMIN — POTASSIUM CHLORIDE 10 MEQ: 7.46 INJECTION, SOLUTION INTRAVENOUS at 16:37

## 2023-11-08 RX ADMIN — POTASSIUM CHLORIDE 10 MEQ: 7.46 INJECTION, SOLUTION INTRAVENOUS at 11:35

## 2023-11-08 RX ADMIN — ONDANSETRON 8 MG: 2 INJECTION INTRAMUSCULAR; INTRAVENOUS at 17:26

## 2023-11-08 RX ADMIN — POTASSIUM CHLORIDE 10 MEQ: 7.46 INJECTION, SOLUTION INTRAVENOUS at 14:39

## 2023-11-08 RX ADMIN — DEXTROSE AND SODIUM CHLORIDE 125 ML/HR: 5; 450 INJECTION, SOLUTION INTRAVENOUS at 05:06

## 2023-11-08 RX ADMIN — FAMOTIDINE 20 MG: 10 INJECTION INTRAVENOUS at 20:06

## 2023-11-08 RX ADMIN — DEXTROSE AND SODIUM CHLORIDE 125 ML/HR: 5; 450 INJECTION, SOLUTION INTRAVENOUS at 13:37

## 2023-11-08 RX ADMIN — ONDANSETRON 8 MG: 2 INJECTION INTRAMUSCULAR; INTRAVENOUS at 07:39

## 2023-11-08 RX ADMIN — PROMETHAZINE HYDROCHLORIDE 12.5 MG: 25 INJECTION INTRAMUSCULAR; INTRAVENOUS at 23:01

## 2023-11-08 RX ADMIN — PROMETHAZINE HYDROCHLORIDE 12.5 MG: 25 INJECTION INTRAMUSCULAR; INTRAVENOUS at 20:10

## 2023-11-08 RX ADMIN — PROMETHAZINE HYDROCHLORIDE 12.5 MG: 25 INJECTION INTRAMUSCULAR; INTRAVENOUS at 15:37

## 2023-11-08 RX ADMIN — ONDANSETRON 8 MG: 2 INJECTION INTRAMUSCULAR; INTRAVENOUS at 23:59

## 2023-11-08 RX ADMIN — FAMOTIDINE 20 MG: 10 INJECTION INTRAVENOUS at 08:51

## 2023-11-08 RX ADMIN — DIPHENHYDRAMINE HYDROCHLORIDE 25 MG: 50 INJECTION, SOLUTION INTRAMUSCULAR; INTRAVENOUS at 18:53

## 2023-11-08 RX ADMIN — DIPHENHYDRAMINE HYDROCHLORIDE 25 MG: 50 INJECTION, SOLUTION INTRAMUSCULAR; INTRAVENOUS at 12:54

## 2023-11-08 NOTE — PLAN OF CARE
Problem: Adult Inpatient Plan of Care  Goal: Plan of Care Review  Outcome: Ongoing, Progressing  Goal: Patient-Specific Goal (Individualized)  Outcome: Ongoing, Progressing  Goal: Absence of Hospital-Acquired Illness or Injury  Outcome: Ongoing, Progressing  Intervention: Prevent Skin Injury  Recent Flowsheet Documentation  Taken 11/8/2023 0851 by Nano Toscano RN  Body Position: position changed independently  Intervention: Prevent and Manage VTE (Venous Thromboembolism) Risk  Recent Flowsheet Documentation  Taken 11/8/2023 0851 by Nano Toscano RN  VTE Prevention/Management: patient refused intervention  Range of Motion: active ROM (range of motion) encouraged  Goal: Optimal Comfort and Wellbeing  Outcome: Ongoing, Progressing  Intervention: Provide Person-Centered Care  Recent Flowsheet Documentation  Taken 11/8/2023 0851 by Nano Toscano RN  Trust Relationship/Rapport:   care explained   choices provided   emotional support provided   empathic listening provided   questions answered   questions encouraged   reassurance provided   thoughts/feelings acknowledged  Goal: Readiness for Transition of Care  Outcome: Ongoing, Progressing     Problem: Nausea and Vomiting  Goal: Fluid and Electrolyte Balance  Outcome: Ongoing, Progressing  Intervention: Prevent and Manage Nausea and Vomiting  Recent Flowsheet Documentation  Taken 11/8/2023 0851 by Nano Toscano RN  Nausea/Vomiting Interventions:   nausea triggers minimized   see MAR   stimuli minimized     Problem: Hyperemesis Gravidarum  Goal: Nausea and Vomiting Relief  Outcome: Ongoing, Progressing     Problem: Maternal-Fetal Wellbeing  Goal: Optimal Maternal-Fetal Wellbeing  Outcome: Ongoing, Progressing     Problem: Fall Injury Risk  Goal: Absence of Fall and Fall-Related Injury  Outcome: Ongoing, Progressing     Problem: VTE (Venous Thromboembolism)  Goal: VTE (Venous Thromboembolism) Symptom Resolution  Outcome: Ongoing, Progressing  Intervention:  Prevent or Manage VTE (Venous Thromboembolism)  Recent Flowsheet Documentation  Taken 11/8/2023 0851 by Nano Toscano, RN  VTE Prevention/Management: patient refused intervention     Problem: Oral Intake Inadequate  Goal: Improved Oral Intake  Outcome: Ongoing, Progressing   Goal Outcome Evaluation:         POC reviewed and updated. Medication regimen changes today with improvement in pt status. Pt has not had emesis in 4+ hours at this time. VSS.

## 2023-11-08 NOTE — PROGRESS NOTES
[facility]  Osbaldo Mayo  : 1995  MRN: [id number]  CSN: [id number]    Hospital Day: 6    CC: hospital follow-up for hyperemesis gravidarum    Antepartum Progress Note    Subjective   The patient is tearful this morning after vomiting overnight and this am.  She had anticipated discharge today.  She does not like the Phenergan suppositories and declines them sometimes because they make her feel like she has to defecate.  The last Phenergan suppository that patient allowed was at 0900 yesterday.  Potassium down to 3.2 this morning.    She denies cramping or bleeding.    Review of Systems       Objective     Min/max vitals past 24 hours:   Temp  Min: 98 °F (36.7 °C)  Max: 98.9 °F (37.2 °C)  BP  Min: 100/59  Max: 110/65  Pulse  Min: 68  Max: 82  Resp  Min: 16  Max: 17         General: Distressed, tearful   Heart: regular rate and rhythm, S1, S2 normal, no murmur, click, rub or gallop   Lungs: breathing is unlabored  clear to auscultation bilaterally   Abdomen: soft, non-tender; no masses       Cervix: was not checked.                   Assessment   IUP at 10w4d  Hyperemesis gravidarum  Hypokalemia  Patient unhappy with phenergan suppositories     Plan   Discussed trying a steroid taper.  However, since patient has not been regularly using phenergan suppositories will first try oral phenergan.  Patient will have the option of having a suppository or oral medication with a sip every 8 hours around the clock.  Steroid taper viewed as a last resort, especially in first trimester of pregnancy.  Patient previously declined Reglan, stating that it gave her diarrhea in the past.  Continue Zofran 8 mg IV.  Alternate Zofran and Phenergan so that patient receives one of these medications every 4 hours.  Continue Pepcid 20 mg BID  NPO/Bowel rest for now  Potassium IV replacement., will recheck BMP in am.              Venice Kang MD  2023  09:48 EST    Addendum:  the patient could not tolerate oral  Phenergan.  Pharmacy will dilute Phenergan and allow IV administration.  This has been ordered.

## 2023-11-08 NOTE — NURSING NOTE
"Pt was able to eat half a piece of toast with peanut butter on it.  An hour later, pt reports that she still feels \"good\" and that she has not had any more emesis since before 0700.  "

## 2023-11-08 NOTE — PLAN OF CARE
Goal Outcome Evaluation:              Outcome Evaluation: VSS. Nausea and vomiting returned during the evening last night.  Unable to keep food down. Patient splet well most of the night.

## 2023-11-09 LAB
ABO GROUP BLD: NORMAL
ANION GAP SERPL CALCULATED.3IONS-SCNC: 7.6 MMOL/L (ref 5–15)
BLD GP AB SCN SERPL QL: NEGATIVE
BUN SERPL-MCNC: <2 MG/DL (ref 6–20)
BUN/CREAT SERPL: ABNORMAL
CALCIUM SPEC-SCNC: 8.7 MG/DL (ref 8.6–10.5)
CHLORIDE SERPL-SCNC: 104 MMOL/L (ref 98–107)
CO2 SERPL-SCNC: 22.4 MMOL/L (ref 22–29)
CREAT SERPL-MCNC: 0.38 MG/DL (ref 0.57–1)
EGFRCR SERPLBLD CKD-EPI 2021: 138.4 ML/MIN/1.73
GLUCOSE SERPL-MCNC: 107 MG/DL (ref 65–99)
POTASSIUM SERPL-SCNC: 3.2 MMOL/L (ref 3.5–5.2)
RH BLD: NEGATIVE
SODIUM SERPL-SCNC: 134 MMOL/L (ref 136–145)
T&S EXPIRATION DATE: NORMAL

## 2023-11-09 PROCEDURE — 25010000002 DIPHENHYDRAMINE PER 50 MG: Performed by: OBSTETRICS & GYNECOLOGY

## 2023-11-09 PROCEDURE — 25010000002 PROMETHAZINE PER 50 MG: Performed by: OBSTETRICS & GYNECOLOGY

## 2023-11-09 PROCEDURE — 86901 BLOOD TYPING SEROLOGIC RH(D): CPT | Performed by: OBSTETRICS & GYNECOLOGY

## 2023-11-09 PROCEDURE — 80048 BASIC METABOLIC PNL TOTAL CA: CPT | Performed by: OBSTETRICS & GYNECOLOGY

## 2023-11-09 PROCEDURE — 86850 RBC ANTIBODY SCREEN: CPT | Performed by: OBSTETRICS & GYNECOLOGY

## 2023-11-09 PROCEDURE — 25010000002 ONDANSETRON PER 1 MG: Performed by: PHYSICIAN ASSISTANT

## 2023-11-09 PROCEDURE — 86900 BLOOD TYPING SEROLOGIC ABO: CPT | Performed by: OBSTETRICS & GYNECOLOGY

## 2023-11-09 RX ADMIN — PROMETHAZINE HYDROCHLORIDE 12.5 MG: 25 INJECTION INTRAMUSCULAR; INTRAVENOUS at 20:19

## 2023-11-09 RX ADMIN — PROMETHAZINE HYDROCHLORIDE 12.5 MG: 25 INJECTION INTRAMUSCULAR; INTRAVENOUS at 08:25

## 2023-11-09 RX ADMIN — FAMOTIDINE 20 MG: 10 INJECTION INTRAVENOUS at 20:16

## 2023-11-09 RX ADMIN — DIPHENHYDRAMINE HYDROCHLORIDE 25 MG: 50 INJECTION, SOLUTION INTRAMUSCULAR; INTRAVENOUS at 06:29

## 2023-11-09 RX ADMIN — PROMETHAZINE HYDROCHLORIDE 12.5 MG: 25 INJECTION INTRAMUSCULAR; INTRAVENOUS at 16:32

## 2023-11-09 RX ADMIN — DIPHENHYDRAMINE HYDROCHLORIDE 25 MG: 50 INJECTION, SOLUTION INTRAMUSCULAR; INTRAVENOUS at 00:12

## 2023-11-09 RX ADMIN — PROMETHAZINE HYDROCHLORIDE 12.5 MG: 25 INJECTION INTRAMUSCULAR; INTRAVENOUS at 03:49

## 2023-11-09 RX ADMIN — Medication 10 ML: at 20:16

## 2023-11-09 RX ADMIN — ONDANSETRON 8 MG: 2 INJECTION INTRAMUSCULAR; INTRAVENOUS at 08:24

## 2023-11-09 RX ADMIN — DEXTROSE AND SODIUM CHLORIDE 125 ML/HR: 5; 450 INJECTION, SOLUTION INTRAVENOUS at 16:00

## 2023-11-09 RX ADMIN — FAMOTIDINE 20 MG: 10 INJECTION INTRAVENOUS at 08:20

## 2023-11-09 RX ADMIN — DIPHENHYDRAMINE HYDROCHLORIDE 25 MG: 50 INJECTION, SOLUTION INTRAMUSCULAR; INTRAVENOUS at 12:17

## 2023-11-09 RX ADMIN — PROMETHAZINE HYDROCHLORIDE 12.5 MG: 25 INJECTION INTRAMUSCULAR; INTRAVENOUS at 12:19

## 2023-11-09 RX ADMIN — ONDANSETRON 8 MG: 2 INJECTION INTRAMUSCULAR; INTRAVENOUS at 16:30

## 2023-11-09 RX ADMIN — DEXTROSE AND SODIUM CHLORIDE 125 ML/HR: 5; 450 INJECTION, SOLUTION INTRAVENOUS at 23:24

## 2023-11-09 RX ADMIN — DEXTROSE AND SODIUM CHLORIDE 125 ML/HR: 5; 450 INJECTION, SOLUTION INTRAVENOUS at 06:34

## 2023-11-09 NOTE — PLAN OF CARE
Problem: Adult Inpatient Plan of Care  Goal: Plan of Care Review  Outcome: Ongoing, Progressing  Goal: Patient-Specific Goal (Individualized)  Outcome: Ongoing, Progressing  Goal: Absence of Hospital-Acquired Illness or Injury  Outcome: Ongoing, Progressing  Goal: Optimal Comfort and Wellbeing  Outcome: Ongoing, Progressing  Intervention: Provide Person-Centered Care  Recent Flowsheet Documentation  Taken 11/9/2023 1016 by Nano Toscano RN  Trust Relationship/Rapport:   care explained   choices provided   emotional support provided   empathic listening provided   questions answered   questions encouraged   reassurance provided   thoughts/feelings acknowledged  Goal: Readiness for Transition of Care  Outcome: Ongoing, Progressing     Problem: Nausea and Vomiting  Goal: Fluid and Electrolyte Balance  Outcome: Ongoing, Progressing  Intervention: Prevent and Manage Nausea and Vomiting  Recent Flowsheet Documentation  Taken 11/9/2023 1016 by Nano Toscano RN  Nausea/Vomiting Interventions:   nausea triggers minimized   see MAR   sips clear liquids given   stimuli minimized   slow deep breathing encouraged     Problem: Hyperemesis Gravidarum  Goal: Nausea and Vomiting Relief  Outcome: Ongoing, Progressing     Problem: Maternal-Fetal Wellbeing  Goal: Optimal Maternal-Fetal Wellbeing  Outcome: Ongoing, Progressing     Problem: Fall Injury Risk  Goal: Absence of Fall and Fall-Related Injury  Outcome: Ongoing, Progressing     Problem: VTE (Venous Thromboembolism)  Goal: VTE (Venous Thromboembolism) Symptom Resolution  Outcome: Ongoing, Progressing     Problem: Oral Intake Inadequate  Goal: Improved Oral Intake  Outcome: Ongoing, Progressing   Goal Outcome Evaluation:

## 2023-11-09 NOTE — PROGRESS NOTES
[facility]  Osbaldo Mayo  : 1995  MRN: [id number]  CSN: [id number]    Hospital Day: 7        Antepartum Progress Note    Subjective       Osbaldo Mayo is a 28 y.o. female  at 10w5d patient reports less nausea and vomiting but it is still present.  She also reports that she becomes very sleepy with the use of Benadryl and Phenergan.  Patient not able to be discharged at this time because her potassium remains 3.2 secondary to nausea and vomiting.      Review of Systems       Objective     Min/max vitals past 24 hours:   Temp  Min: 97.5 °F (36.4 °C)  Max: 99.1 °F (37.3 °C)  BP  Min: 122/69  Max: 146/78  Pulse  Min: 73  Max: 85  Resp  Min: 16  Max: 16               General: well developed; well nourished  no acute distress   Heart: Not performed.   Lungs: breathing is unlabored   Abdomen: soft, non-tender; no masses  no umbilical or inguinal hernias are present  no hepato-splenomegaly       Cervix: was not checked.   Contractions: none        Scheduled Meds:diphenhydrAMINE, 25 mg, Intravenous, Q6H  famotidine, 20 mg, Intravenous, BID  ondansetron, 8 mg, Intravenous, Q8H  promethazine, 12.5 mg, Intravenous, Q4H  sodium chloride, 10 mL, Intravenous, Q12H      Continuous Infusions:dextrose 5 % and sodium chloride 0.45 %, 125 mL/hr, Last Rate: 125 mL/hr (23 0634)      PRN Meds:.  calcium carbonate    famotidine    sodium chloride    sodium chloride            Recent Results (from the past 24 hour(s))   Basic Metabolic Panel    Collection Time: 23  7:00 AM    Specimen: Blood   Result Value Ref Range    Glucose 107 (H) 65 - 99 mg/dL    BUN <2 (L) 6 - 20 mg/dL    Creatinine 0.38 (L) 0.57 - 1.00 mg/dL    Sodium 134 (L) 136 - 145 mmol/L    Potassium 3.2 (L) 3.5 - 5.2 mmol/L    Chloride 104 98 - 107 mmol/L    CO2 22.4 22.0 - 29.0 mmol/L    Calcium 8.7 8.6 - 10.5 mg/dL    BUN/Creatinine Ratio      Anion Gap 7.6 5.0 - 15.0 mmol/L    eGFR 138.4 >60.0 mL/min/1.73   Type & Screen    Collection  Time: 11/09/23  7:00 AM    Specimen: Blood   Result Value Ref Range    ABO Type A     RH type Negative     Antibody Screen Negative     T&S Expiration Date 11/12/2023 11:59:59 PM                  Assessment   IUP at 10w5d  Hyperemesis gravidarum  Hypokalemia secondary to hyperemesis gravidarum     Plan   We will continue potassium replacement as required  IV Zofran every 8 hours to alternate with Phenergan every 8 hours to give her medications every 4 hours to control nausea and vomiting  We will allow patient to attempt to eat today if she feels like eating  Patient would like to deliver at [facility] currently stating her physician is at [facility] and would like to transfer to a doctor at [facility] will discussed with Dr. Silverman who is covering for [facility] who is on for Non attached to patient's today  Repeat CMP in a.m.  Stephane Garza MD  11/9/2023  14:11 EST  OB Hospitalist  Phone:  [phone]

## 2023-11-09 NOTE — PLAN OF CARE
Goal Outcome Evaluation:  Plan of Care Reviewed With: patient           Outcome Evaluation: VSS and afebrile. constant nausea all night while awake and when pt. got up to the bathroom to void, a couple times progressed to actual emesis. pt. feels overall weak. pt. is losing weight since admission and will pass on in report for next nurse to talk to OB about nutrition concerns for pt. BMP and type and screen to be drawn in am

## 2023-11-09 NOTE — PROGRESS NOTES
"Nutrition Services    Patient Name:  Osbaldo Mayo  YOB: 1995  MRN: [id number]  Admit Date:  11/3/2023    Assessment Date:  23    Follow up - d/w pt's nurse, chart reviewed. Pt continues to have n/v and hypokalemia. NPO/Clears since . Diet advanced today to Regular but still does not feel like eating. Both zofran and phenergan being given alternately Q 4 hours. Will continue to monitor diet tolerance and PO intake. May need TPN/nutrition support given lack of intake and weight loss. Recommendations below. RD to follow.     CLINICAL NUTRITION       Reason for Assessment Follow-up Protocol     Diagnosis/Problem   Hyperemesis Gravidarum   -27 yo  with EDC 24 presented with ongoing issues with nausea and vomiting, causing dehydration and hypokalemia/e'lyte abnormality. She has reportedly lost 30 lb with this pregnancy. Getting IV fluids and anti-nausea meds.     Medical/Surgical History Past Medical History:   Diagnosis Date    Anemia     Heart murmur     as a child    Hypoglycemia     Uterine fibroids affecting pregnancy, antepartum        Past Surgical History:   Procedure Laterality Date    DILATATION AND CURETTAGE      WISDOM TOOTH EXTRACTION          Anthropometrics        Current Height  Current Weight  BMI kg/m2 Height: 172.7 cm (68\")  Weight: 76.3 kg (168 lb 3.2 oz) (23 1800)  Body mass index is 25.57 kg/m².   Adjusted BMI (if applicable)    BMI Category Overweight (25 - 29.9)   Ideal Body Weight (IBW) 140#   Usual Body Weight (UBW) 179# 2023   Weight Trend Other: pt reported 30 lb loss?   Weight History Wt Readings from Last 30 Encounters:   23 1800 76.3 kg (168 lb 3.2 oz)   23 1116 79.4 kg (175 lb)   10/16/23 1157 84.4 kg (186 lb)   23 2239 81.2 kg (179 lb)   16 1440 62.1 kg (137 lb)   16 0906 62.1 kg (137 lb)   16 1317 62.1 kg (137 lb)   16 2242 62.1 kg (137 lb)        Estimated/Assessed Needs        Current Weight  Weight: " "76.3 kg (168 lb 3.2 oz) (11/06/23 1800)       Energy Requirements    Weight for Calculation 79.4 kg   Method for Estimation  25 kcal/kg, 30 kcal/kg   EST Needs (kcal/day) 2226-4693       Protein Requirements    Weight for Calculation 79.4 kg   EST Protein Needs (g/kg) 1.0 - 1.2 gm/kg   EST Daily Needs (g/day) 79-95       Fluid Requirements     Method for Estimation 30 mL/kg    EST Needs (mL/day) 2382      Labs       Pertinent Labs    Results from last 7 days   Lab Units 11/09/23  0700 11/08/23  0710 11/07/23  0529 11/06/23  1426 11/06/23  0335   SODIUM mmol/L 134* 133* 135*  --  136   POTASSIUM mmol/L 3.2* 3.2* 3.3*  3.3*   < > 3.3*   CHLORIDE mmol/L 104 103 104  --  107   CO2 mmol/L 22.4 23.0 20.6*  --  20.5*   BUN mg/dL <2* <2* <2*  --  <2*   CREATININE mg/dL 0.38* 0.43* 0.39*  --  0.40*   CALCIUM mg/dL 8.7 8.8 8.5*  --  8.7   BILIRUBIN mg/dL  --  0.2 0.2  --  0.2   ALK PHOS U/L  --  65 65  --  59   ALT (SGPT) U/L  --  7 6  --  6   AST (SGOT) U/L  --  8 8  --  10   GLUCOSE mg/dL 107* 104* 112*  --  109*    < > = values in this interval not displayed.     Results from last 7 days   Lab Units 11/08/23  0710 11/07/23  0529 11/06/23  0335 11/05/23  1643   MAGNESIUM mg/dL  --  1.6 1.6 1.7   HEMOGLOBIN g/dL  --   --   --  11.8*   HEMATOCRIT %  --   --   --  36.5   WBC 10*3/mm3  --   --   --  3.64   ALBUMIN g/dL 3.3* 3.1* 3.0* 3.6     Results from last 7 days   Lab Units 11/05/23  1643 11/04/23  0458 11/03/23  0801   PLATELETS 10*3/mm3 241 227 248     No results found for: \"COVID19\"  No results found for: \"HGBA1C\"       Medications           Scheduled Medications diphenhydrAMINE, 25 mg, Intravenous, Q6H  famotidine, 20 mg, Intravenous, BID  ondansetron, 8 mg, Intravenous, Q8H  promethazine, 12.5 mg, Intravenous, Q4H  sodium chloride, 10 mL, Intravenous, Q12H       Infusions dextrose 5 % and sodium chloride 0.45 %, 125 mL/hr, Last Rate: 125 mL/hr (11/09/23 0634)       PRN Medications   calcium carbonate    famotidine    " sodium chloride    sodium chloride     Physical Findings          General Findings alert, other: drowsy   Oral/Mouth Cavity WDL   Edema  no edema   Gastrointestinal Nausea, vomiting   Skin  skin intact   Tubes/Drains/Lines none   NFPE Not indicated at this time   --  Current Nutrition Orders & Evaluation of Intake       Oral Nutrition     Food Allergies NKFA   Current PO Diet Diet: Regular/House Diet; Texture: Regular Texture (IDDSI 7); Fluid Consistency: Thin (IDDSI 0)   Supplement n/a   PO Evaluation     % PO Intake Little to no intake    Factors Affecting Intake: nausea, vomiting, weakness   --  PES STATEMENT / NUTRITION DIAGNOSIS      Nutrition Dx Problem  Problem: Inadequate Oral Intake  Etiology: Medical Diagnosis - Hyperemesis Gravidarum    Signs/Symptoms: PO Diet Not Tolerated     NUTRITION INTERVENTION / PLAN OF CARE      Intervention Goal(s) Reduce/improve symptoms, Disease management/therapy, Initiate feeding/diet, and Maintain weight         RD Intervention/Action Await initiation/advancement of PO diet and Recommend/order: TPN   --      Prescription/Orders:       PO Diet       Supplements       Enteral Nutrition       Parenteral Nutrition See below   New Prescription Ordered? No changes at this time      Parenteral Prescription:     TPN Route Pending   TPN Rate (mL/hr) Per pharmacy   TPN Recommendation: (GOAL)       Dextrose (kcal) 1300       Amino Acid (gm) 95       Lipid Concentration 20%       Lipid Volume/Frequency  100 mL, daily   Propofol Rate/Kcal    TPN Provision:   1880 kcal, 95 gm protein        Calories 98 % needs met        Protein  100 % needs met        Fluid     Prescription Ordered No, recommended         Monitor/Evaluation Per protocol, I&O, PO intake, Pertinent labs, Weight, GI status, Symptoms, POC/GOC   Discharge Plan/Needs Pending clinical course       RD to follow per protocol.  Electronically signed by:  Nicole Mason RD  11/09/23 16:28 EST

## 2023-11-10 LAB
ALBUMIN SERPL-MCNC: 3 G/DL (ref 3.5–5.2)
ALBUMIN SERPL-MCNC: 3.1 G/DL (ref 3.5–5.2)
ALBUMIN/GLOB SERPL: 1.1 G/DL
ALBUMIN/GLOB SERPL: 1.2 G/DL
ALP SERPL-CCNC: 64 U/L (ref 39–117)
ALP SERPL-CCNC: 66 U/L (ref 39–117)
ALT SERPL W P-5'-P-CCNC: 8 U/L (ref 1–33)
ALT SERPL W P-5'-P-CCNC: 8 U/L (ref 1–33)
ANION GAP SERPL CALCULATED.3IONS-SCNC: 7 MMOL/L (ref 5–15)
ANION GAP SERPL CALCULATED.3IONS-SCNC: 8 MMOL/L (ref 5–15)
ANION GAP SERPL CALCULATED.3IONS-SCNC: 9.1 MMOL/L (ref 5–15)
AST SERPL-CCNC: 7 U/L (ref 1–32)
AST SERPL-CCNC: 9 U/L (ref 1–32)
BILIRUB SERPL-MCNC: 0.3 MG/DL (ref 0–1.2)
BILIRUB SERPL-MCNC: 0.3 MG/DL (ref 0–1.2)
BUN SERPL-MCNC: <2 MG/DL (ref 6–20)
BUN/CREAT SERPL: ABNORMAL
CALCIUM SPEC-SCNC: 8.5 MG/DL (ref 8.6–10.5)
CALCIUM SPEC-SCNC: 8.5 MG/DL (ref 8.6–10.5)
CALCIUM SPEC-SCNC: 8.8 MG/DL (ref 8.6–10.5)
CHLORIDE SERPL-SCNC: 104 MMOL/L (ref 98–107)
CO2 SERPL-SCNC: 22.9 MMOL/L (ref 22–29)
CO2 SERPL-SCNC: 23 MMOL/L (ref 22–29)
CO2 SERPL-SCNC: 23 MMOL/L (ref 22–29)
CREAT SERPL-MCNC: 0.29 MG/DL (ref 0.57–1)
CREAT SERPL-MCNC: 0.3 MG/DL (ref 0.57–1)
CREAT SERPL-MCNC: 0.39 MG/DL (ref 0.57–1)
EGFRCR SERPLBLD CKD-EPI 2021: 137.6 ML/MIN/1.73
EGFRCR SERPLBLD CKD-EPI 2021: 146.6 ML/MIN/1.73
EGFRCR SERPLBLD CKD-EPI 2021: 147.8 ML/MIN/1.73
GLOBULIN UR ELPH-MCNC: 2.6 GM/DL
GLOBULIN UR ELPH-MCNC: 2.7 GM/DL
GLUCOSE SERPL-MCNC: 111 MG/DL (ref 65–99)
GLUCOSE SERPL-MCNC: 112 MG/DL (ref 65–99)
GLUCOSE SERPL-MCNC: 133 MG/DL (ref 65–99)
MAGNESIUM SERPL-MCNC: 1.7 MG/DL (ref 1.6–2.6)
POTASSIUM SERPL-SCNC: 2.7 MMOL/L (ref 3.5–5.2)
POTASSIUM SERPL-SCNC: 2.9 MMOL/L (ref 3.5–5.2)
POTASSIUM SERPL-SCNC: 3 MMOL/L (ref 3.5–5.2)
POTASSIUM SERPL-SCNC: 3 MMOL/L (ref 3.5–5.2)
PROT SERPL-MCNC: 5.7 G/DL (ref 6–8.5)
PROT SERPL-MCNC: 5.7 G/DL (ref 6–8.5)
SODIUM SERPL-SCNC: 134 MMOL/L (ref 136–145)
SODIUM SERPL-SCNC: 135 MMOL/L (ref 136–145)
SODIUM SERPL-SCNC: 136 MMOL/L (ref 136–145)
TRIGL SERPL-MCNC: 79 MG/DL (ref 0–150)

## 2023-11-10 PROCEDURE — 25010000002 ONDANSETRON PER 1 MG: Performed by: PHYSICIAN ASSISTANT

## 2023-11-10 PROCEDURE — 83735 ASSAY OF MAGNESIUM: CPT | Performed by: OBSTETRICS & GYNECOLOGY

## 2023-11-10 PROCEDURE — 25010000002 PROMETHAZINE PER 50 MG: Performed by: OBSTETRICS & GYNECOLOGY

## 2023-11-10 PROCEDURE — 25010000002 DIPHENHYDRAMINE PER 50 MG: Performed by: OBSTETRICS & GYNECOLOGY

## 2023-11-10 PROCEDURE — 80053 COMPREHEN METABOLIC PANEL: CPT | Performed by: OBSTETRICS & GYNECOLOGY

## 2023-11-10 PROCEDURE — 84478 ASSAY OF TRIGLYCERIDES: CPT | Performed by: OBSTETRICS & GYNECOLOGY

## 2023-11-10 PROCEDURE — 25010000002 POTASSIUM CHLORIDE 10 MEQ/100ML SOLUTION: Performed by: OBSTETRICS & GYNECOLOGY

## 2023-11-10 PROCEDURE — 84132 ASSAY OF SERUM POTASSIUM: CPT | Performed by: OBSTETRICS & GYNECOLOGY

## 2023-11-10 RX ORDER — PANTOPRAZOLE SODIUM 40 MG/10ML
40 INJECTION, POWDER, LYOPHILIZED, FOR SOLUTION INTRAVENOUS
Status: DISCONTINUED | OUTPATIENT
Start: 2023-11-11 | End: 2023-11-15 | Stop reason: HOSPADM

## 2023-11-10 RX ORDER — DEXTROSE MONOHYDRATE 100 MG/ML
75 INJECTION, SOLUTION INTRAVENOUS CONTINUOUS
Status: DISCONTINUED | OUTPATIENT
Start: 2023-11-10 | End: 2023-11-15 | Stop reason: HOSPADM

## 2023-11-10 RX ORDER — POTASSIUM CHLORIDE 7.45 MG/ML
10 INJECTION INTRAVENOUS
Status: COMPLETED | OUTPATIENT
Start: 2023-11-10 | End: 2023-11-10

## 2023-11-10 RX ADMIN — ANTACID TABLETS 2 TABLET: 500 TABLET, CHEWABLE ORAL at 17:29

## 2023-11-10 RX ADMIN — PROMETHAZINE HYDROCHLORIDE 12.5 MG: 25 INJECTION INTRAMUSCULAR; INTRAVENOUS at 17:26

## 2023-11-10 RX ADMIN — PROMETHAZINE HYDROCHLORIDE 12.5 MG: 25 INJECTION INTRAMUSCULAR; INTRAVENOUS at 09:29

## 2023-11-10 RX ADMIN — DIPHENHYDRAMINE HYDROCHLORIDE 25 MG: 50 INJECTION, SOLUTION INTRAMUSCULAR; INTRAVENOUS at 00:54

## 2023-11-10 RX ADMIN — DEXTROSE MONOHYDRATE 50 ML/HR: 100 INJECTION, SOLUTION INTRAVENOUS at 18:07

## 2023-11-10 RX ADMIN — POTASSIUM CHLORIDE 10 MEQ: 7.46 INJECTION, SOLUTION INTRAVENOUS at 13:31

## 2023-11-10 RX ADMIN — ONDANSETRON 8 MG: 2 INJECTION INTRAMUSCULAR; INTRAVENOUS at 08:05

## 2023-11-10 RX ADMIN — DIPHENHYDRAMINE HYDROCHLORIDE 25 MG: 50 INJECTION, SOLUTION INTRAMUSCULAR; INTRAVENOUS at 20:07

## 2023-11-10 RX ADMIN — PROMETHAZINE HYDROCHLORIDE 12.5 MG: 25 INJECTION INTRAMUSCULAR; INTRAVENOUS at 13:30

## 2023-11-10 RX ADMIN — FAMOTIDINE 20 MG: 10 INJECTION INTRAVENOUS at 20:07

## 2023-11-10 RX ADMIN — ONDANSETRON 8 MG: 2 INJECTION INTRAMUSCULAR; INTRAVENOUS at 16:18

## 2023-11-10 RX ADMIN — DEXTROSE AND SODIUM CHLORIDE 125 ML/HR: 5; 450 INJECTION, SOLUTION INTRAVENOUS at 08:02

## 2023-11-10 RX ADMIN — DIPHENHYDRAMINE HYDROCHLORIDE 25 MG: 50 INJECTION, SOLUTION INTRAMUSCULAR; INTRAVENOUS at 13:31

## 2023-11-10 RX ADMIN — ONDANSETRON 8 MG: 2 INJECTION INTRAMUSCULAR; INTRAVENOUS at 00:54

## 2023-11-10 RX ADMIN — POTASSIUM CHLORIDE 10 MEQ: 7.46 INJECTION, SOLUTION INTRAVENOUS at 12:28

## 2023-11-10 RX ADMIN — POTASSIUM CHLORIDE 10 MEQ: 7.46 INJECTION, SOLUTION INTRAVENOUS at 14:50

## 2023-11-10 RX ADMIN — POTASSIUM CHLORIDE 10 MEQ: 7.46 INJECTION, SOLUTION INTRAVENOUS at 11:33

## 2023-11-10 RX ADMIN — FAMOTIDINE 20 MG: 10 INJECTION INTRAVENOUS at 06:53

## 2023-11-10 RX ADMIN — POTASSIUM CHLORIDE 10 MEQ: 7.46 INJECTION, SOLUTION INTRAVENOUS at 09:29

## 2023-11-10 RX ADMIN — DIPHENHYDRAMINE HYDROCHLORIDE 25 MG: 50 INJECTION, SOLUTION INTRAMUSCULAR; INTRAVENOUS at 06:38

## 2023-11-10 RX ADMIN — POTASSIUM CHLORIDE 10 MEQ: 7.46 INJECTION, SOLUTION INTRAVENOUS at 10:29

## 2023-11-10 RX ADMIN — PROMETHAZINE HYDROCHLORIDE 12.5 MG: 25 INJECTION INTRAMUSCULAR; INTRAVENOUS at 00:14

## 2023-11-10 RX ADMIN — PROMETHAZINE HYDROCHLORIDE 12.5 MG: 25 INJECTION INTRAMUSCULAR; INTRAVENOUS at 05:08

## 2023-11-10 RX ADMIN — DEXTROSE AND SODIUM CHLORIDE 125 ML/HR: 5; 450 INJECTION, SOLUTION INTRAVENOUS at 16:18

## 2023-11-10 NOTE — NURSING NOTE
Called L&D, notified of order for FHT daily. Said they would notify charge RN and someone would be over this shift.

## 2023-11-10 NOTE — PROGRESS NOTES
Continued Stay Note  [facility]     Patient Name: Osbaldo Mayo  MRN: [id number]  Today's Date: 11/10/2023    Admit Date: 11/3/2023    Plan: Home no needs   Discharge Plan       Row Name 11/10/23 1257       Plan    Plan Home no needs    Plan Comments Introduced self and role of CCP. Patient confirmed DC plan is to return to home. Family will assist as needed and will provide transportation at DC. Denies any needs/equipment.                   Discharge Codes    No documentation.                       Bety Reyna RN

## 2023-11-10 NOTE — PAYOR COMM NOTE
"Osbaldo Ralph (28 y.o. Female)       Date of Birth   1995    Social Security Number       Address   [address] [place] [facility] [address]    Home Phone   [phone]    MRN   [id number]       Christian   None    Marital Status   Single                            Admission Date   11/3/23    Admission Type   Emergency    Admitting Provider   Jaycee Owusu MD    Attending Provider   Jaycee Owusu MD    Department, Room/Bed   [address], P678/1       Discharge Date       Discharge Disposition       Discharge Destination                                 Attending Provider: Jaycee Owusu MD    Allergies: No Known Allergies    Isolation: None   Infection: None   Code Status: CPR    Ht: 172.7 cm (68\")   Wt: 76.3 kg (168 lb 3.2 oz)    Admission Cmt: None   Principal Problem: Hyperemesis gravidarum [O21.0]                   Active Insurance as of 11/3/2023       Primary Coverage       Payor Plan Insurance Group Employer/Plan Group    [employer] [id number]       Payor Plan Address Payor Plan Phone Number Payor Plan Fax Number Effective Dates    [address] [address]   1/1/2021 - None Entered    [facility] [id number]         Subscriber Name Subscriber Birth Date Member ID       OSBALDO RALPH 1995 [id number]                     Emergency Contacts        (Rel.) Home Phone Work Phone Mobile Phone    Carolyn Ralph (Mother) -- -- [phone]              Insurance Information                  [employer] Phone: --    Subscriber: Osbaldo Ralph Subscriber#: [id number]    Group#: [id number] Precert#: --             History & Physical        Joselyn Willis APRN at 11/03/23 1106       Attestation signed by Yusuf Myrick MD at 11/03/23 8426    I have reviewed this documentation and agree.  MD ATTESTATION NOTE    The ANSLEY and I have discussed this patient's history, physical exam, and " treatment plan.  I have reviewed the documentation and personally had a face to face interaction with the patient. I affirm the documentation and agree with the treatment and plan.  The attached note describes my personal findings.      I provided a substantive portion of the care of the patient.  I personally performed the physical exam in its entirety, and below are my findings.     Brief HPI: 28-year-old who is 9 weeks pregnant with recurrent nausea and vomiting.  The patient denies abdominal pain, fevers, chills or dysuria.  She denies vaginal bleeding, vaginal discharge or lower abdominal cramping.  The patient states that she has had similar symptoms in prior pregnancies.  The patient was seen in the emergency room and noted to be dehydrated with a mild metabolic acidosis.  We were asked to admit the patient to the observation unit for IV hydration and IV antiemetics and consult OB.    General : 28-year-old patient is awake alert and oriented  HEENT: NCAT  CV: Heart is regular with no murmurs  Respiratory: CTA bilaterally  Abd: Soft and nontender  Ext: No acute abnormalities  Skin: No rash  Neuro: Cranial nerves II through XII grossly intact as tested.  No acute lateralizing deficits.  Psych: Normal mood and affect    Plan: We will admit the patient for observation, check fetal heart tones, treat her with Zofran and likely just and place her on D5 half-normal saline.  We will consult OB to see the patient.  Currently in the room the patient states she feels better and understands and agrees with the plan.                   [facility]   HISTORY AND PHYSICAL    Patient Name: Osbaldo Mayo  : 1995  MRN: [id number]  Primary Care Physician:  Provider, No Known  Date of admission: 11/3/2023    Subjective  Subjective     Chief Complaint:   Chief Complaint   Patient presents with    Vomiting During Pregnancy         HPI:    Osbaldo Mayo is a pleasant afebrile ambulatory 28 y.o. black female with a  past medical history of hyperemesis gravidarum, anemia and hypoglycemia.    She presents to the emergency department at [facility] today with complaint of nausea and vomiting.  She has been admitted to the ED observation unit for further testing and evaluation.    Patient reports that she is currently approximately 10 weeks pregnant.  She states she follows with Dr. Cronin with [facility].  She advises that she has had nonbilious nonbloody emesis since approximately 2100 last evening.  She has taken Zofran and Diclegis which have worked with prior pregnancies but does not seem to be getting any relief.  She endorses some nausea but denies any abdominal pain, fever, chills, dysuria or urinary frequency.  She denies any vaginal bleeding or abnormal vaginal discharge.  States she has not had a prenatal appointment with her OB/GYN for this pregnancy yet.    OB History:   Para Term  AB Living   5 3 3 1 3     Review of Systems   All systems were reviewed and negative except for: What is mentioned above    Personal History     Past Medical History:   Diagnosis Date    Anemia     Hypoglycemia        No past surgical history on file.    Family History: family history includes Asthma in her brother and sister; Cancer in her maternal grandfather and another family member; Diabetes in her mother and another family member; Hypertension in her mother and another family member; No Known Problems in her father, maternal aunt, maternal grandmother, maternal uncle, paternal aunt, paternal grandfather, paternal grandmother, and paternal uncle. Otherwise pertinent FHx was reviewed and not pertinent to current issue.    Social History:  reports that she quit smoking about 6 weeks ago. Her smoking use included cigars. She started smoking about 6 years ago. She has never used smokeless tobacco. She reports current alcohol use. She reports that she does not currently use drugs after having used the  following drugs: Marijuana.    Home Medications:  doxylamine, doxylamine-pyridoxine ER, and ondansetron ODT    Allergies:  No Known Allergies    Objective  Objective     Vitals:   Temp:  [97.5 °F (36.4 °C)] 97.5 °F (36.4 °C)  Heart Rate:  [] 87  Resp:  [16] 16  BP: (112-146)/() 112/73  Physical Exam    Constitutional: Awake, alert   Eyes: PERRLA, sclerae anicteric, no conjunctival injection   HENT: NCAT, mucous membranes are dry and tacky   Neck: Supple, no thyromegaly, no lymphadenopathy, trachea midline   Respiratory: Clear to auscultation bilaterally, nonlabored respirations    Cardiovascular: RRR, no murmurs, rubs, or gallops, palpable pedal pulses bilaterally   Gastrointestinal: Positive bowel sounds, soft, nontender, nondistended   Musculoskeletal: No bilateral ankle edema, no clubbing or cyanosis to extremities   Psychiatric: Anxious affect, cooperative   Neurologic: Oriented x 3, strength symmetric in all extremities, Cranial Nerves grossly intact to confrontation, speech clear   Skin: No rashes     Result Review   Result Review:  I have personally reviewed the results from the time of this admission to 11/3/2023 11:06 EDT and agree with these findings:  [x]  Laboratory list / accordion  []  Microbiology  []  Radiology  []  EKG/Telemetry   []  Cardiology/Vascular   []  Pathology  []  Old records  []  Other:  Most notable findings include: Serum sodium 132, CO2 16.4, urinalysis positive for 80 ketones, protein, 3-5 WBCs, negative for bacteria, 3-6 squamous epithelials      Assessment & Plan  Assessment / Plan     Brief Patient Summary:  Osbaldo Mayo is a 28 y.o. female who is being evaluated for hyperemesis gravidarum    Active Hospital Problems:  Active Hospital Problems    Diagnosis     **Hyperemesis gravidarum      Plan:     Hyperemesis gravidarum  Consult to Ob-Gyn  Prn antiemetics with zofran and diclegis  D5 1/s NS @ 100mL/hr  Fetal heart tones      DVT prophylaxis:  Mechanical DVT  prophylaxis orders are present.    CODE STATUS:    Level Of Support Discussed With: Patient  Code Status (Patient has no pulse and is not breathing): CPR (Attempt to Resuscitate)  Medical Interventions (Patient has pulse or is breathing): Full Support    Admission Status:  I believe this patient meets observation status.    Electronically signed by TIARA Patel, 11/03/23, 11:06 AM EDT.        78 minutes has been spent by [facility] providers in the care of this patient while under observation status      I have worn appropriate PPE during this patient encounter, sanitized my hands both with entering and exiting patient's room.             Electronically signed by Yusuf Myrick MD at 11/03/23 2158       Facility-Administered Medications as of 11/10/2023   Medication Dose Route Frequency Provider Last Rate Last Admin    calcium carbonate (TUMS) chewable tablet 500 mg (200 mg elemental)  2 tablet Oral TID PRN Rosaura Zavala MD   2 tablet at 11/07/23 1732    Calcium Replacement - Follow Nurse / BPA Driven Protocol   Does not apply PRN Rosaura Zavala MD        dextrose 5 % and sodium chloride 0.45 % infusion  125 mL/hr Intravenous Continuous Sharri Bain  mL/hr at 11/10/23 1618 125 mL/hr at 11/10/23 1618    diphenhydrAMINE (BENADRYL) injection 25 mg  25 mg Intravenous Q6H Venice Kang MD   25 mg at 11/10/23 1331    doxylamine-pyridoxine 25-25 mg combo dose   Oral BID PRN Rosaura Zavala MD        [COMPLETED] famotidine (PEPCID) injection 20 mg  20 mg Intravenous Once Jeffry Srivastava PA   20 mg at 11/03/23 0919    [COMPLETED] famotidine (PEPCID) injection 20 mg  20 mg Intravenous Once Sharri Bain PA   20 mg at 11/04/23 0906    famotidine (PEPCID) injection 20 mg  20 mg Intravenous Q12H PRN Jaycee Owusu MD   20 mg at 11/06/23 2128    famotidine (PEPCID) injection 20 mg  20 mg Intravenous BID Rosaura Zavala MD   20 mg at 11/10/23 0653     Magnesium Standard Dose Replacement - Follow Nurse / BPA Driven Protocol   Does not apply PRN Rosaura Zavala MD        [COMPLETED] ondansetron (ZOFRAN) injection 4 mg  4 mg Intravenous Once Jeffry Srivastava PA   4 mg at 23 0800    [COMPLETED] ondansetron (ZOFRAN) injection 4 mg  4 mg Intravenous Once Jeffry Srivastava PA   4 mg at 23 0919    ondansetron (ZOFRAN) injection 8 mg  8 mg Intravenous Q8H Sharri Bain PA   8 mg at 11/10/23 1618    Phosphorus Replacement - Follow Nurse / BPA Driven Protocol   Does not apply PRN Rosaura Zavala MD        [] potassium chloride 10 mEq in 100 mL IVPB  10 mEq Intravenous Q1H Sharri Bain  mL/hr at 23 1015 10 mEq at 23 1015    [COMPLETED] potassium chloride 10 mEq in 100 mL IVPB  10 mEq Intravenous Once Cherelle Panda APRN 100 mL/hr at 23 2232 10 mEq at 23 2232    [COMPLETED] potassium chloride 10 mEq in 100 mL IVPB  10 mEq Intravenous Once Cherelle Panda APRN 100 mL/hr at 23 0103 10 mEq at 23 0103    [COMPLETED] potassium chloride 10 mEq in 100 mL IVPB  10 mEq Intravenous Once Cherelle Panda APRN 100 mL/hr at 23 0549 10 mEq at 23 0549    [COMPLETED] potassium chloride 10 mEq in 100 mL IVPB  10 mEq Intravenous Q1H Jaycee Owusu  mL/hr at 23 2227 10 mEq at 23 2227    [COMPLETED] potassium chloride 10 mEq in 100 mL IVPB  10 mEq Intravenous Q1H Jaycee Owusu  mL/hr at 23 1016 10 mEq at 23 1016    [COMPLETED] potassium chloride 10 mEq in 100 mL IVPB  10 mEq Intravenous Q1H Stephane Garza MD   Stopped at 23    [COMPLETED] potassium chloride 10 mEq in 100 mL IVPB  10 mEq Intravenous Q1H Venice Kang MD   Stopped at 23 4606    [COMPLETED] potassium chloride 10 mEq in 100 mL IVPB  10 mEq Intravenous Q1H Jaycee Owusu  mL/hr at 11/10/23 1450 10 mEq at 11/10/23 1450    Potassium Replacement - Follow Nurse / BPA Driven  Protocol   Does not apply PRN Rosaura Zavala MD        Potassium Replacement - Follow Nurse / BPA Driven Protocol   Does not apply PRN Rosaura Zavala MD        promethazine (PHENERGAN) 12.5 mg in sodium chloride 0.9 % 50 mL  12.5 mg Intravenous Q4H Venice Kang MD 0 mL/hr at 11/09/23 2038 12.5 mg at 11/10/23 1330    [COMPLETED] sodium chloride 0.9 % bolus 1,000 mL  1,000 mL Intravenous Once Jeffry Srivastava PA   Stopped at 11/03/23 1031    sodium chloride 0.9 % flush 10 mL  10 mL Intravenous Q12H Joselyn Willis APRN   10 mL at 11/09/23 2016    sodium chloride 0.9 % flush 10 mL  10 mL Intravenous PRN Lazaro Joselyn, APRN        sodium chloride 0.9 % infusion 40 mL  40 mL Intravenous PRN Joselyn Willis, APRN        [COMPLETED] sodium chloride 0.9 % with KCl 20 mEq/L infusion  125 mL/hr Intravenous Once Rosaura Zavala  mL/hr at 11/07/23 1142 125 mL/hr at 11/07/23 1142     Lab Results (last 72 hours)       Procedure Component Value Units Date/Time    Basic Metabolic Panel [[id number]]  (Abnormal) Collected: 11/10/23 0748    Specimen: Blood Updated: 11/10/23 0837     Glucose 112 mg/dL      BUN <2 mg/dL      Creatinine 0.30 mg/dL      Sodium 135 mmol/L      Potassium 2.9 mmol/L      Chloride 104 mmol/L      CO2 23.0 mmol/L      Calcium 8.5 mg/dL      BUN/Creatinine Ratio --     Comment: Unable to calculate Bun/Crea Ratio.        Anion Gap 8.0 mmol/L      eGFR 146.6 mL/min/1.73     Narrative:      GFR Normal >60  Chronic Kidney Disease <60  Kidney Failure <15      Magnesium [[id number]]  (Normal) Collected: 11/10/23 0549    Specimen: Blood Updated: 11/10/23 0736     Magnesium 1.7 mg/dL     Comprehensive Metabolic Panel [[id number]]  (Abnormal) Collected: 11/10/23 0549    Specimen: Blood Updated: 11/10/23 0642     Glucose 111 mg/dL      BUN <2 mg/dL      Creatinine 0.29 mg/dL      Sodium 136 mmol/L      Potassium 2.7 mmol/L      Chloride 104 mmol/L      CO2 22.9 mmol/L      Calcium 8.5 mg/dL      Total  Protein 5.7 g/dL      Albumin 3.0 g/dL      ALT (SGPT) 8 U/L      AST (SGOT) 9 U/L      Alkaline Phosphatase 64 U/L      Total Bilirubin 0.3 mg/dL      Globulin 2.7 gm/dL      A/G Ratio 1.1 g/dL      BUN/Creatinine Ratio --     Comment: Unable to calculate Bun/Crea Ratio.        Anion Gap 9.1 mmol/L      eGFR 147.8 mL/min/1.73     Narrative:      GFR Normal >60  Chronic Kidney Disease <60  Kidney Failure <15      Basic Metabolic Panel [[id number]]  (Abnormal) Collected: 11/09/23 0700    Specimen: Blood Updated: 11/09/23 0754     Glucose 107 mg/dL      BUN <2 mg/dL      Creatinine 0.38 mg/dL      Sodium 134 mmol/L      Potassium 3.2 mmol/L      Chloride 104 mmol/L      CO2 22.4 mmol/L      Calcium 8.7 mg/dL      BUN/Creatinine Ratio --     Comment: Unable to calculate Bun/Crea Ratio.        Anion Gap 7.6 mmol/L      eGFR 138.4 mL/min/1.73     Narrative:      GFR Normal >60  Chronic Kidney Disease <60  Kidney Failure <15      Comprehensive Metabolic Panel [[id number]]  (Abnormal) Collected: 11/08/23 0710    Specimen: Blood Updated: 11/08/23 0829     Glucose 104 mg/dL      BUN <2 mg/dL      Creatinine 0.43 mg/dL      Sodium 133 mmol/L      Potassium 3.2 mmol/L      Chloride 103 mmol/L      CO2 23.0 mmol/L      Calcium 8.8 mg/dL      Total Protein 5.8 g/dL      Albumin 3.3 g/dL      ALT (SGPT) 7 U/L      AST (SGOT) 8 U/L      Alkaline Phosphatase 65 U/L      Total Bilirubin 0.2 mg/dL      Globulin 2.5 gm/dL      A/G Ratio 1.3 g/dL      BUN/Creatinine Ratio --     Comment: Unable to calculate Bun/Crea Ratio.        Anion Gap 7.0 mmol/L      eGFR 134.4 mL/min/1.73     Narrative:      GFR Normal >60  Chronic Kidney Disease <60  Kidney Failure <15            Imaging Results (Last 72 Hours)       ** No results found for the last 72 hours. **          ECG/EMG Results (last 72 hours)       ** No results found for the last 72 hours. **          Orders (last 72 hrs)        Start     Ordered    11/10/23 1923  Potassium  Timed          11/10/23 0723    11/10/23 1619  Monitor Fetal Heart Tones  Once        Comments: Doppler fetal heart tones before discharge or until order modified.    11/10/23 1619    11/10/23 1242  doxylamine-pyridoxine 25-25 mg combo dose  2 Times Daily PRN         11/10/23 1242    11/10/23 0815  potassium chloride 10 mEq in 100 mL IVPB  Every 1 Hour         11/10/23 0723    11/10/23 0719  Basic Metabolic Panel  Daily       11/10/23 0718    11/10/23 0719  Magnesium  Once         11/10/23 0718    11/10/23 0717  Potassium Replacement - Follow Nurse / BPA Driven Protocol  As Needed         11/10/23 0718    11/10/23 0717  Magnesium Standard Dose Replacement - Follow Nurse / BPA Driven Protocol  As Needed         11/10/23 0718    11/10/23 0717  Phosphorus Replacement - Follow Nurse / BPA Driven Protocol  As Needed         11/10/23 0718    11/10/23 0717  Calcium Replacement - Follow Nurse / BPA Driven Protocol  As Needed         11/10/23 0718    11/10/23 0717  Potassium Replacement - Follow Nurse / BPA Driven Protocol  As Needed         11/10/23 0718    11/10/23 0600  Comprehensive Metabolic Panel  Morning Draw         11/09/23 1420    11/09/23 2000  Transfer Patient  Once        Comments: Transfer to [facility].    11/09/23 2000    11/09/23 1111  Diet: Regular/House Diet; Texture: Regular Texture (IDDSI 7); Fluid Consistency: Thin (IDDSI 0)  Diet Effective Now        Comments: As tolerated.    11/09/23 1110    11/09/23 0800  [facility]  1 Time Imaging,   Status:  Canceled         11/08/23 1906    11/09/23 0600  Basic Metabolic Panel  Morning Draw         11/08/23 1441    11/09/23 0600  Type & Screen  Morning Draw         11/08/23 1938    11/09/23 0600  Comprehensive Metabolic Panel  Morning Draw,   Status:  Canceled         11/08/23 2034    11/08/23 1300  promethazine (PHENERGAN) 12.5 mg in sodium chloride 0.9 % 50 mL  Every 4 Hours         11/08/23 1205    11/08/23 1300  diphenhydrAMINE (BENADRYL) injection 25  mg  Every 6 Hours         11/08/23 1210    11/08/23 1100  potassium chloride 10 mEq in 100 mL IVPB  Every 1 Hour         11/08/23 1005    11/08/23 1045  promethazine (PHENERGAN) tablet 25 mg  Every 8 Hours,   Status:  Discontinued        See Hyperspace for full Linked Orders Report.    11/08/23 0947    11/08/23 1045  promethazine (PHENERGAN) suppository 25 mg  Every 8 Hours,   Status:  Discontinued        See Hyperspace for full Linked Orders Report.    11/08/23 0947    11/08/23 0946  NPO Diet NPO Type: Sips with Meds  Diet Effective Now,   Status:  Canceled         11/08/23 0945    11/08/23 0800  Diet: Liquid Diets; Clear Liquid; Fluid Consistency: Thin (IDDSI 0)  Diet Effective Now,   Status:  Canceled         11/08/23 0800    11/08/23 0600  Comprehensive Metabolic Panel  Morning Draw         11/07/23 1629    11/08/23 0015  Type & Screen  Once,   Status:  Canceled         11/07/23 2005 11/07/23 1506  calcium carbonate (TUMS) chewable tablet 500 mg (200 mg elemental)  3 Times Daily PRN         11/07/23 1506    11/07/23 0915  famotidine (PEPCID) injection 20 mg  2 Times Daily         11/07/23 0823    11/06/23 1845  potassium chloride 10 mEq in 100 mL IVPB  Every 1 Hour         11/06/23 1754    11/05/23 1830  famotidine (PEPCID) injection 20 mg  Every 12 Hours PRN         11/05/23 1822    11/05/23 1600  Vital Signs q 4 while awake  Every 4 Hours      Comments: While the patient is awake.    11/05/23 1559    11/05/23 1400  dextrose 5 % and sodium chloride 0.45 % infusion  Continuous         11/05/23 1300    11/04/23 2100  doxylamine-pyridoxine 25-25 mg combo dose  Nightly,   Status:  Discontinued         11/04/23 0802    11/04/23 1400  ondansetron (ZOFRAN) injection 8 mg  Every 8 Hours         11/04/23 0958    11/04/23 1000  promethazine (PHENERGAN) suppository 25 mg  Every 8 Hours,   Status:  Discontinued         11/04/23 0910    11/03/23 1114  sodium chloride 0.9 % flush 10 mL  Every 12 Hours Scheduled          23 1059    23 1057  Intake & Output  Every Shift       23 1059    23 1056  sodium chloride 0.9 % infusion 40 mL  As Needed         23 1059    23 1056  sodium chloride 0.9 % flush 10 mL  As Needed         23 1059    --  doxylamine (UNISOM) 25 MG tablet  Nightly PRN         23 1101    --  SCANNED - TELEMETRY           23 0000    --  SCANNED - TELEMETRY           23 0000    --  SCANNED - TELEMETRY           23 0000    --  SCANNED - TELEMETRY           23 0000    --  SCANNED - TELEMETRY           23 0000    --  SCANNED - TELEMETRY           23 0000                  Operative/Procedure Notes (last 72 hours)  Notes from 23 1641 through 11/10/23 164   No notes of this type exist for this encounter.          Physician Progress Notes (last 72 hours)        Stephane Garza MD at 23 1411          [facility]  Osbaldo Mayo  : 1995  MRN: [id number]  CSN: [id number]    Hospital Day: 7        Antepartum Progress Note    Subjective       Osbaldo Mayo is a 28 y.o. female  at 10w5d patient reports less nausea and vomiting but it is still present.  She also reports that she becomes very sleepy with the use of Benadryl and Phenergan.  Patient not able to be discharged at this time because her potassium remains 3.2 secondary to nausea and vomiting.      Review of Systems      Objective     Min/max vitals past 24 hours:   Temp  Min: 97.5 °F (36.4 °C)  Max: 99.1 °F (37.3 °C)  BP  Min: 122/69  Max: 146/78  Pulse  Min: 73  Max: 85  Resp  Min: 16  Max: 16               General: well developed; well nourished  no acute distress   Heart: Not performed.   Lungs: breathing is unlabored   Abdomen: soft, non-tender; no masses  no umbilical or inguinal hernias are present  no hepato-splenomegaly       Cervix: was not checked.   Contractions: none        Scheduled Meds:diphenhydrAMINE, 25 mg, Intravenous, Q6H  famotidine,  20 mg, Intravenous, BID  ondansetron, 8 mg, Intravenous, Q8H  promethazine, 12.5 mg, Intravenous, Q4H  sodium chloride, 10 mL, Intravenous, Q12H      Continuous Infusions:dextrose 5 % and sodium chloride 0.45 %, 125 mL/hr, Last Rate: 125 mL/hr (11/09/23 0634)      PRN Meds:.  calcium carbonate    famotidine    sodium chloride    sodium chloride            Recent Results (from the past 24 hour(s))   Basic Metabolic Panel    Collection Time: 11/09/23  7:00 AM    Specimen: Blood   Result Value Ref Range    Glucose 107 (H) 65 - 99 mg/dL    BUN <2 (L) 6 - 20 mg/dL    Creatinine 0.38 (L) 0.57 - 1.00 mg/dL    Sodium 134 (L) 136 - 145 mmol/L    Potassium 3.2 (L) 3.5 - 5.2 mmol/L    Chloride 104 98 - 107 mmol/L    CO2 22.4 22.0 - 29.0 mmol/L    Calcium 8.7 8.6 - 10.5 mg/dL    BUN/Creatinine Ratio      Anion Gap 7.6 5.0 - 15.0 mmol/L    eGFR 138.4 >60.0 mL/min/1.73   Type & Screen    Collection Time: 11/09/23  7:00 AM    Specimen: Blood   Result Value Ref Range    ABO Type A     RH type Negative     Antibody Screen Negative     T&S Expiration Date 11/12/2023 11:59:59 PM                 Assessment   IUP at 10w5d  Hyperemesis gravidarum  Hypokalemia secondary to hyperemesis gravidarum    Plan   We will continue potassium replacement as required  IV Zofran every 8 hours to alternate with Phenergan every 8 hours to give her medications every 4 hours to control nausea and vomiting  We will allow patient to attempt to eat today if she feels like eating  Patient would like to deliver at [facility] currently stating her physician is at [facility] and would like to transfer to a doctor at [facility] will discussed with Dr. Silverman who is covering for [facility] who is on for Non attached to patient's today  Repeat CMP in a.m.  Stephane Garza MD  11/9/2023  14:11 EST  OB Hospitalist  Phone:  [phone]    Electronically signed by Stephane Garza MD at 11/09/23 1471       Venice Kang MD at 11/08/23 5762           [facility]  Osbaldo Mayo  : 1995  MRN: [id number]  CSN: [id number]    Hospital Day: 6    CC: hospital follow-up for hyperemesis gravidarum    Antepartum Progress Note    Subjective   The patient is tearful this morning after vomiting overnight and this am.  She had anticipated discharge today.  She does not like the Phenergan suppositories and declines them sometimes because they make her feel like she has to defecate.  The last Phenergan suppository that patient allowed was at 0900 yesterday.  Potassium down to 3.2 this morning.    She denies cramping or bleeding.    Review of Systems      Objective     Min/max vitals past 24 hours:   Temp  Min: 98 °F (36.7 °C)  Max: 98.9 °F (37.2 °C)  BP  Min: 100/59  Max: 110/65  Pulse  Min: 68  Max: 82  Resp  Min: 16  Max: 17         General: Distressed, tearful   Heart: regular rate and rhythm, S1, S2 normal, no murmur, click, rub or gallop   Lungs: breathing is unlabored  clear to auscultation bilaterally   Abdomen: soft, non-tender; no masses       Cervix: was not checked.                  Assessment   IUP at 10w4d  Hyperemesis gravidarum  Hypokalemia  Patient unhappy with phenergan suppositories    Plan   Discussed trying a steroid taper.  However, since patient has not been regularly using phenergan suppositories will first try oral phenergan.  Patient will have the option of having a suppository or oral medication with a sip every 8 hours around the clock.  Steroid taper viewed as a last resort, especially in first trimester of pregnancy.  Patient previously declined Reglan, stating that it gave her diarrhea in the past.  Continue Zofran 8 mg IV.  Alternate Zofran and Phenergan so that patient receives one of these medications every 4 hours.  Continue Pepcid 20 mg BID  NPO/Bowel rest for now  Potassium IV replacement., will recheck BMP in am.              Venice Kang MD  2023  09:48 EST    Addendum:  the patient could not tolerate oral Phenergan.   Pharmacy will dilute Phenergan and allow IV administration.  This has been ordered.             Electronically signed by Venice Kang MD at 11/08/23 1206       Consult Notes (last 72 hours)  Notes from 11/07/23 1641 through 11/10/23 1641   No notes of this type exist for this encounter.

## 2023-11-10 NOTE — NURSING NOTE
Patient transferred to [facility] room 678 via stretcher accompanied by Ya with Transport.

## 2023-11-10 NOTE — PLAN OF CARE
Goal Outcome Evaluation:  Plan of Care Reviewed With: patient        Progress: no change  Outcome Evaluation: Patient with intermittent nausea and small amounts of emesis. Scheduled Zofran & Phenergan given. IVF infusing. IV K+ replacement given for 2.7 this AM, re-check this evening. Up ad halle. Resting in bed between care. FHT ordered daily, L&D notified. Regular diet ordered, but patient only taking sips of clear liquids. VSS.

## 2023-11-10 NOTE — PROGRESS NOTES
[facility]  Osbaldo Mayo  : 1995  MRN: [id number]  CSN: [id number]    Hospital Day: 8    CC: hospital follow-up for hyperemesis.      Antepartum Progress Note    Subjective   Patient with dinner at bedside but she hasn't eaten any of it.  She reports she is tolerating liquids okay but the burning from the reflux is worsening and is the main cause of her ongoing emesis.  She states the Pepcid and Tums help but she hasn't received Tums since getting transferred.  I encouraged her to call nurse for this as it is ordered PRN.    Review of Systems   Constitutional:  Negative for chills, fatigue and fever.   HENT:  Negative for congestion, rhinorrhea and sore throat.    Eyes:  Negative for visual disturbance.   Respiratory: Negative.     Cardiovascular: Negative.    Gastrointestinal:  Positive for nausea and vomiting. Negative for abdominal pain, constipation and diarrhea.        Reflux   Genitourinary:  Negative for difficulty urinating, dyspareunia, dysuria, flank pain, frequency, genital sores, hematuria, pelvic pain, urgency, vaginal bleeding, vaginal discharge and vaginal pain.   Neurological:  Negative for dizziness, seizures, light-headedness and headaches.   Psychiatric/Behavioral:  Negative for sleep disturbance. The patient is not nervous/anxious.           Objective     Min/max vitals past 24 hours:   Temp  Min: 97.4 °F (36.3 °C)  Max: 98.9 °F (37.2 °C)  BP  Min: 99/64  Max: 155/73  Pulse  Min: 69  Max: 91  Resp  Min: 16  Max: 18         General: well developed; well nourished  no acute distress   Heart: Not performed.   Lungs: breathing is unlabored   Abdomen: soft, non-tender; no masses  no umbilical or inguinal hernias are present  no hepato-splenomegaly   FHT's: Normal doptones present   Cervix: was not checked.   Contractions: none               Assessment   IUP at 10w6d  Hyperemesis gravidarum with ongoing hypokalemia and weight loss- potassium not replaced yesterday and this morning down  to 2.7.  Replacement protocol ordered for today.  At this point, patient has not improved either clinically or with potassium levels.  She is having ongoing reflux that is not controlled entirely with Pepcid. Since October 16th, she has a documented weight loss of almost twenty pounds.        Plan   Potassium replacement per procotol with repeat BMP in AM.  Continue zofran and phenergan as needed for nausea.  TPN ordered as this was recommended by nutrition yesterday.  Plan to start in AM and pharmacy will dose.  Pharmacy also recommends starting D10W now at 50 mL/hour.    Diclegis and pantoprazole added to see if this helps her symptoms.  She has taken Diclegis before and it has helped.    At this point if patient doesn't improve in next day or two, would strongly consider glucocorticoids and MFM consult for further guidance.                Rosaura Zavala MD  11/10/2023  16:46 EST

## 2023-11-10 NOTE — PLAN OF CARE
Goal Outcome Evaluation:  Plan of Care Reviewed With: patient        Progress: no change  Outcome Evaluation: Continues with nausea and small amts of emesis. Medicated with scheduled zofran, phenergan and benadryl as ordered.Pt states nurse checked her fetal hearttones before she was transfered to [facility]. She states they cant assess it for too long--they lose it easily. Call placed to L&D to request that they come to check fetal hearttones for us. No vaginal bleeding. No c/o pain. Taking sips of water. IVFS contiue. The transferring nurse from antepartum states she checked with the OB/GYN to inquire if potassium needed to be replaced and was told that they will wait and recheck labs in am.

## 2023-11-10 NOTE — NURSING NOTE
Call to Dr. Garza to notify of Transfer to [facility] room 678. Clarified potassium replacement plan because she was 3.2 this am. Per MD will hold off on replacement at this time and address with the AM lab results.

## 2023-11-11 LAB
ALBUMIN SERPL-MCNC: 3.1 G/DL (ref 3.5–5.2)
ALBUMIN/GLOB SERPL: 1.1 G/DL
ALP SERPL-CCNC: 69 U/L (ref 39–117)
ALT SERPL W P-5'-P-CCNC: 6 U/L (ref 1–33)
ANION GAP SERPL CALCULATED.3IONS-SCNC: 8.1 MMOL/L (ref 5–15)
AST SERPL-CCNC: 8 U/L (ref 1–32)
BILIRUB SERPL-MCNC: 0.3 MG/DL (ref 0–1.2)
BUN SERPL-MCNC: <2 MG/DL (ref 6–20)
BUN/CREAT SERPL: ABNORMAL
CALCIUM SPEC-SCNC: 8.8 MG/DL (ref 8.6–10.5)
CHLORIDE SERPL-SCNC: 103 MMOL/L (ref 98–107)
CO2 SERPL-SCNC: 21.9 MMOL/L (ref 22–29)
CREAT SERPL-MCNC: 0.33 MG/DL (ref 0.57–1)
EGFRCR SERPLBLD CKD-EPI 2021: 143.2 ML/MIN/1.73
GLOBULIN UR ELPH-MCNC: 2.9 GM/DL
GLUCOSE SERPL-MCNC: 118 MG/DL (ref 65–99)
MAGNESIUM SERPL-MCNC: 1.7 MG/DL (ref 1.6–2.6)
PHOSPHATE SERPL-MCNC: 2.8 MG/DL (ref 2.5–4.5)
POTASSIUM SERPL-SCNC: 3.3 MMOL/L (ref 3.5–5.2)
POTASSIUM SERPL-SCNC: 3.4 MMOL/L (ref 3.5–5.2)
PROT SERPL-MCNC: 6 G/DL (ref 6–8.5)
SODIUM SERPL-SCNC: 133 MMOL/L (ref 136–145)

## 2023-11-11 PROCEDURE — 25010000002 ONDANSETRON PER 1 MG: Performed by: PHYSICIAN ASSISTANT

## 2023-11-11 PROCEDURE — 25010000002 POTASSIUM CHLORIDE PER 2 MEQ OF POTASSIUM: Performed by: OBSTETRICS & GYNECOLOGY

## 2023-11-11 PROCEDURE — 25010000002 POTASSIUM CHLORIDE 10 MEQ/100ML SOLUTION: Performed by: OBSTETRICS & GYNECOLOGY

## 2023-11-11 PROCEDURE — 25010000002 DIPHENHYDRAMINE PER 50 MG: Performed by: OBSTETRICS & GYNECOLOGY

## 2023-11-11 PROCEDURE — 02HV33Z INSERTION OF INFUSION DEVICE INTO SUPERIOR VENA CAVA, PERCUTANEOUS APPROACH: ICD-10-PCS | Performed by: OBSTETRICS & GYNECOLOGY

## 2023-11-11 PROCEDURE — 25010000002 CALCIUM GLUCONATE PER 10 ML: Performed by: OBSTETRICS & GYNECOLOGY

## 2023-11-11 PROCEDURE — 3E0436Z INTRODUCTION OF NUTRITIONAL SUBSTANCE INTO CENTRAL VEIN, PERCUTANEOUS APPROACH: ICD-10-PCS | Performed by: OBSTETRICS & GYNECOLOGY

## 2023-11-11 PROCEDURE — 83735 ASSAY OF MAGNESIUM: CPT | Performed by: OBSTETRICS & GYNECOLOGY

## 2023-11-11 PROCEDURE — 84100 ASSAY OF PHOSPHORUS: CPT | Performed by: OBSTETRICS & GYNECOLOGY

## 2023-11-11 PROCEDURE — C1751 CATH, INF, PER/CENT/MIDLINE: HCPCS

## 2023-11-11 PROCEDURE — 84132 ASSAY OF SERUM POTASSIUM: CPT | Performed by: OBSTETRICS & GYNECOLOGY

## 2023-11-11 PROCEDURE — 25010000002 MAGNESIUM SULFATE PER 500 MG OF MAGNESIUM: Performed by: OBSTETRICS & GYNECOLOGY

## 2023-11-11 PROCEDURE — 25010000002 PROMETHAZINE PER 50 MG: Performed by: OBSTETRICS & GYNECOLOGY

## 2023-11-11 PROCEDURE — 80053 COMPREHEN METABOLIC PANEL: CPT | Performed by: OBSTETRICS & GYNECOLOGY

## 2023-11-11 RX ORDER — SODIUM CHLORIDE 0.9 % (FLUSH) 0.9 %
20 SYRINGE (ML) INJECTION AS NEEDED
Status: DISCONTINUED | OUTPATIENT
Start: 2023-11-11 | End: 2023-11-15 | Stop reason: HOSPADM

## 2023-11-11 RX ORDER — ACETAMINOPHEN 325 MG/1
650 TABLET ORAL ONCE
Status: COMPLETED | OUTPATIENT
Start: 2023-11-11 | End: 2023-11-11

## 2023-11-11 RX ORDER — POTASSIUM CHLORIDE 7.45 MG/ML
10 INJECTION INTRAVENOUS
Status: COMPLETED | OUTPATIENT
Start: 2023-11-11 | End: 2023-11-12

## 2023-11-11 RX ORDER — SODIUM CHLORIDE 0.9 % (FLUSH) 0.9 %
10 SYRINGE (ML) INJECTION EVERY 12 HOURS SCHEDULED
Status: DISCONTINUED | OUTPATIENT
Start: 2023-11-11 | End: 2023-11-15 | Stop reason: HOSPADM

## 2023-11-11 RX ORDER — POTASSIUM CHLORIDE 7.45 MG/ML
10 INJECTION INTRAVENOUS
Status: COMPLETED | OUTPATIENT
Start: 2023-11-11 | End: 2023-11-11

## 2023-11-11 RX ORDER — SODIUM CHLORIDE 0.9 % (FLUSH) 0.9 %
10 SYRINGE (ML) INJECTION AS NEEDED
Status: DISCONTINUED | OUTPATIENT
Start: 2023-11-11 | End: 2023-11-15 | Stop reason: HOSPADM

## 2023-11-11 RX ADMIN — POTASSIUM CHLORIDE 10 MEQ: 7.46 INJECTION, SOLUTION INTRAVENOUS at 18:47

## 2023-11-11 RX ADMIN — PANTOPRAZOLE SODIUM 40 MG: 40 INJECTION, POWDER, FOR SOLUTION INTRAVENOUS at 06:21

## 2023-11-11 RX ADMIN — POTASSIUM CHLORIDE 10 MEQ: 7.46 INJECTION, SOLUTION INTRAVENOUS at 07:28

## 2023-11-11 RX ADMIN — POTASSIUM CHLORIDE 10 MEQ: 7.46 INJECTION, SOLUTION INTRAVENOUS at 03:53

## 2023-11-11 RX ADMIN — DIPHENHYDRAMINE HYDROCHLORIDE 25 MG: 50 INJECTION, SOLUTION INTRAMUSCULAR; INTRAVENOUS at 00:47

## 2023-11-11 RX ADMIN — ONDANSETRON 8 MG: 2 INJECTION INTRAMUSCULAR; INTRAVENOUS at 15:45

## 2023-11-11 RX ADMIN — ANTACID TABLETS 2 TABLET: 500 TABLET, CHEWABLE ORAL at 09:50

## 2023-11-11 RX ADMIN — POTASSIUM CHLORIDE 10 MEQ: 7.46 INJECTION, SOLUTION INTRAVENOUS at 23:48

## 2023-11-11 RX ADMIN — PROMETHAZINE HYDROCHLORIDE 12.5 MG: 25 INJECTION INTRAMUSCULAR; INTRAVENOUS at 18:01

## 2023-11-11 RX ADMIN — FAMOTIDINE 20 MG: 10 INJECTION INTRAVENOUS at 08:37

## 2023-11-11 RX ADMIN — PROMETHAZINE HYDROCHLORIDE 12.5 MG: 25 INJECTION INTRAMUSCULAR; INTRAVENOUS at 05:22

## 2023-11-11 RX ADMIN — DEXTROSE AND SODIUM CHLORIDE 125 ML/HR: 5; 450 INJECTION, SOLUTION INTRAVENOUS at 09:52

## 2023-11-11 RX ADMIN — DIPHENHYDRAMINE HYDROCHLORIDE 25 MG: 50 INJECTION, SOLUTION INTRAMUSCULAR; INTRAVENOUS at 15:43

## 2023-11-11 RX ADMIN — CALCIUM GLUCONATE: 98 INJECTION, SOLUTION INTRAVENOUS at 21:34

## 2023-11-11 RX ADMIN — ANTACID TABLETS 2 TABLET: 500 TABLET, CHEWABLE ORAL at 03:51

## 2023-11-11 RX ADMIN — POTASSIUM CHLORIDE 10 MEQ: 7.46 INJECTION, SOLUTION INTRAVENOUS at 00:47

## 2023-11-11 RX ADMIN — ACETAMINOPHEN 650 MG: 325 TABLET ORAL at 09:50

## 2023-11-11 RX ADMIN — DEXTROSE AND SODIUM CHLORIDE 125 ML/HR: 5; 450 INJECTION, SOLUTION INTRAVENOUS at 21:39

## 2023-11-11 RX ADMIN — DIPHENHYDRAMINE HYDROCHLORIDE 25 MG: 50 INJECTION, SOLUTION INTRAMUSCULAR; INTRAVENOUS at 06:21

## 2023-11-11 RX ADMIN — DEXTROSE AND SODIUM CHLORIDE 125 ML/HR: 5; 450 INJECTION, SOLUTION INTRAVENOUS at 00:48

## 2023-11-11 RX ADMIN — POTASSIUM CHLORIDE 10 MEQ: 7.46 INJECTION, SOLUTION INTRAVENOUS at 06:21

## 2023-11-11 RX ADMIN — POTASSIUM CHLORIDE 10 MEQ: 7.46 INJECTION, SOLUTION INTRAVENOUS at 05:21

## 2023-11-11 RX ADMIN — Medication 10 ML: at 21:34

## 2023-11-11 RX ADMIN — POTASSIUM CHLORIDE 10 MEQ: 7.46 INJECTION, SOLUTION INTRAVENOUS at 02:18

## 2023-11-11 RX ADMIN — ONDANSETRON 8 MG: 2 INJECTION INTRAMUSCULAR; INTRAVENOUS at 00:47

## 2023-11-11 RX ADMIN — DIPHENHYDRAMINE HYDROCHLORIDE 25 MG: 50 INJECTION, SOLUTION INTRAMUSCULAR; INTRAVENOUS at 21:35

## 2023-11-11 RX ADMIN — FAMOTIDINE 20 MG: 10 INJECTION INTRAVENOUS at 21:34

## 2023-11-11 RX ADMIN — PHENOL 1 SPRAY: 1.5 LIQUID ORAL at 04:06

## 2023-11-11 RX ADMIN — PROMETHAZINE HYDROCHLORIDE 12.5 MG: 25 INJECTION INTRAMUSCULAR; INTRAVENOUS at 09:53

## 2023-11-11 RX ADMIN — ONDANSETRON 8 MG: 2 INJECTION INTRAMUSCULAR; INTRAVENOUS at 08:30

## 2023-11-11 NOTE — PROGRESS NOTES
"[facility] Clinical Pharmacy Services: Total Parental Nutrition Initial Consult    Indication:  Severe hyperemsis gravidarum  Route: peripheral  Type: standard    Relevant clinical data and objective history reviewed:  28 y.o. female 172.7 cm (68\") 76.3 kg (168 lb 3.2 oz)    Results from last 7 days   Lab Units 11/11/23  0623 11/10/23  1912   SODIUM mmol/L 133* 134*   POTASSIUM mmol/L 3.4* 3.0*  3.0*   CHLORIDE mmol/L 103 104   CO2 mmol/L 21.9* 23.0   BUN mg/dL <2* <2*   CREATININE mg/dL 0.33* 0.39*   CALCIUM mg/dL 8.8 8.8   ALBUMIN g/dL 3.1* 3.1*   BILIRUBIN mg/dL 0.3 0.3   ALK PHOS U/L 69 66   ALT (SGPT) U/L 6 8   AST (SGOT) U/L 8 7   GLUCOSE mg/dL 118* 133*   MAGNESIUM mg/dL 1.7  --    PHOSPHORUS mg/dL 2.8  --    TRIGLYCERIDES mg/dL  --  79        Estimated Creatinine Clearance: 300.3 mL/min (A) (by C-G formula based on SCr of 0.33 mg/dL (L)).    Active fluid orders:   D5W with 1/2NS @125 ml/hr    Dietary Orders (From admission, onward)       Start     Ordered    11/09/23 1111  Diet: Regular/House Diet; Texture: Regular Texture (IDDSI 7); Fluid Consistency: Thin (IDDSI 0)  Diet Effective Now        Comments: As tolerated.   References:    Diet Order [place]   Question Answer Comment   Diets: Regular/House Diet    Texture: Regular Texture (IDDSI 7)    Fluid Consistency: Thin (IDDSI 0)        11/09/23 1110                  Assessment  Ideal Body Weight: 63.9 kg  Body Weight Used for Calculations: 76.3 kg  Daily Est. Vicente: 1880 kCal/Day  Estimated Fluid Requirements: ~2300 mL/day    Goal   Protein: 1.3 grams/kg/day (95 grams/day)   Dextrose: 1300 Kcal/day   Lipids: 100 ml of 20% lipid infusion 7 days/week    Plan  Will start TPN at half goal and will taper up as appropriate. Will initiate TPN with the following macros:   Protein/Dextrose/Lipids: 80/500/0    Volume: 1800 ml (75 mL/hr over 24 hrs daily)    Based on the above labs, will add the following electrolytes/additives to the TPN.    Sodium Chloride: 50 " mEq   Sodium Acetate: 0 mEq   Sodium Phosphate: 15 mEq   Potassium Chloride: 40 mEq   Potassium Acetate: 0 mEq   Potassium Phosphate: 0 mEq   Calcium Gluconate: 9 mEq   Magnesium Sulfate: 8 mEq   MVI for TPN   Trace Elements                Labs ordered: Daily CMP, Mg, Phos    Spoke with RN who confirms patient currently only has a peripheral line. Given the patient's clinical status and location indicated, ordered TPN to be administered peripherally although can also be administered centrally. Had to minimally reduce protein/dextrose in the TPN to allow for peripheral administration (not enough to be clinically different from what I would do with a central TPN, though). Conveyed that to reach dietary macronutrient goals, a central line would need to be placed. Pharmacy will continue to follow.     Raheem Manriquez, PharmD, BCPS, BCOP  Clinical Pharmacist

## 2023-11-11 NOTE — PROGRESS NOTES
[facility]  Osbaldo Mayo  : 1995  MRN: [id number]  CSN: [id number]    Hospital Day: 9        Antepartum Progress Note    Subjective       Osbaldo Mayo is a 28 y.o. female  at 11w0d patient reports still feeling weak and is having continued nausea and vomiting when she attempts to eat.  She is starting on peripheral TPN at 6 PM this evening      Review of Systems   Constitutional:  Negative for chills, fatigue and fever.   HENT: Negative.     Eyes:  Negative for photophobia and visual disturbance.   Respiratory:  Negative for cough, chest tightness and shortness of breath.    Cardiovascular:  Negative for chest pain and leg swelling.   Gastrointestinal:  Positive for nausea and vomiting. Negative for abdominal pain and diarrhea.        Heartburn   Genitourinary:  Negative for dysuria, flank pain, hematuria, pelvic pain, vaginal bleeding and vaginal discharge.   Musculoskeletal:  Negative for back pain.   Neurological:  Negative for dizziness, seizures, weakness and headaches.          Objective     Min/max vitals past 24 hours:   Temp  Min: 97.3 °F (36.3 °C)  Max: 98.6 °F (37 °C)  BP  Min: 119/72  Max: 132/75  Pulse  Min: 73  Max: 86  Resp  Min: 16  Max: 18               General: no acute distress   Heart: Not performed.   Lungs: breathing is unlabored   Abdomen: soft, non-tender; no masses       Cervix: was not checked.              Recent Results (from the past 24 hour(s))   Potassium    Collection Time: 11/10/23  7:12 PM    Specimen: Blood   Result Value Ref Range    Potassium 3.0 (L) 3.5 - 5.2 mmol/L   Triglycerides    Collection Time: 11/10/23  7:12 PM    Specimen: Blood   Result Value Ref Range    Triglycerides 79 0 - 150 mg/dL   Comprehensive Metabolic Panel    Collection Time: 11/10/23  7:12 PM    Specimen: Blood   Result Value Ref Range    Glucose 133 (H) 65 - 99 mg/dL    BUN <2 (L) 6 - 20 mg/dL    Creatinine 0.39 (L) 0.57 - 1.00 mg/dL    Sodium 134 (L) 136 - 145 mmol/L    Potassium  3.0 (L) 3.5 - 5.2 mmol/L    Chloride 104 98 - 107 mmol/L    CO2 23.0 22.0 - 29.0 mmol/L    Calcium 8.8 8.6 - 10.5 mg/dL    Total Protein 5.7 (L) 6.0 - 8.5 g/dL    Albumin 3.1 (L) 3.5 - 5.2 g/dL    ALT (SGPT) 8 1 - 33 U/L    AST (SGOT) 7 1 - 32 U/L    Alkaline Phosphatase 66 39 - 117 U/L    Total Bilirubin 0.3 0.0 - 1.2 mg/dL    Globulin 2.6 gm/dL    A/G Ratio 1.2 g/dL    BUN/Creatinine Ratio      Anion Gap 7.0 5.0 - 15.0 mmol/L    eGFR 137.6 >60.0 mL/min/1.73   Magnesium    Collection Time: 11/11/23  6:23 AM    Specimen: Blood   Result Value Ref Range    Magnesium 1.7 1.6 - 2.6 mg/dL   Phosphorus    Collection Time: 11/11/23  6:23 AM    Specimen: Blood   Result Value Ref Range    Phosphorus 2.8 2.5 - 4.5 mg/dL   Comprehensive Metabolic Panel    Collection Time: 11/11/23  6:23 AM    Specimen: Blood   Result Value Ref Range    Glucose 118 (H) 65 - 99 mg/dL    BUN <2 (L) 6 - 20 mg/dL    Creatinine 0.33 (L) 0.57 - 1.00 mg/dL    Sodium 133 (L) 136 - 145 mmol/L    Potassium 3.4 (L) 3.5 - 5.2 mmol/L    Chloride 103 98 - 107 mmol/L    CO2 21.9 (L) 22.0 - 29.0 mmol/L    Calcium 8.8 8.6 - 10.5 mg/dL    Total Protein 6.0 6.0 - 8.5 g/dL    Albumin 3.1 (L) 3.5 - 5.2 g/dL    ALT (SGPT) 6 1 - 33 U/L    AST (SGOT) 8 1 - 32 U/L    Alkaline Phosphatase 69 39 - 117 U/L    Total Bilirubin 0.3 0.0 - 1.2 mg/dL    Globulin 2.9 gm/dL    A/G Ratio 1.1 g/dL    BUN/Creatinine Ratio      Anion Gap 8.1 5.0 - 15.0 mmol/L    eGFR 143.2 >60.0 mL/min/1.73   Potassium    Collection Time: 11/11/23  1:27 PM    Specimen: Hand, Left; Blood   Result Value Ref Range    Potassium 3.3 (L) 3.5 - 5.2 mmol/L            Assessment   IUP at 11w0d  Hypokalemia improved with most recent potassium 3.3, she is to receive peripheral TPN which contains 40 mEq of potassium with repeat potassium level to be obtained in the morning  In view of need for TPN patient will require a PICC line which will be placed either later today or tomorrow  Will order ultrasound for  tomorrow for fetal evaluation and for viability  Will begin daily weights as she has lost approximately 20 pounds since October 16  Will need maternal-fetal medicine consult, will obtain on Monday, 11/13/2023 as we have no in-house MFM on weekends     Plan   PICC line for TPN, pharmacy managing TPN  Follow serial labs as required to assess electrolytes  Ultrasound tomorrow for evaluation of fetus and viability  MFM consult planned for 11/13/2023  Daily weight    Stephane Garza MD  11/11/2023  16:17 EST  OB Hospitalist  Phone:  [phone]

## 2023-11-11 NOTE — PLAN OF CARE
Goal Outcome Evaluation:  Plan of Care Reviewed With: patient        Progress: no change  Outcome Evaluation: VSS, nausea and vomiting continue, phenergan, zofran, and tums given, IV potassium replacement given, timed re-check set for 1223, IVF infusing, pt only tolerating small sips of clear liquids, order for chloraseptic obtained and given for c/o sore throat

## 2023-11-11 NOTE — PROGRESS NOTES
"Nutrition Services    Patient Name:  Osbaldo Mayo  YOB: 1995  MRN: [id number]  Admit Date:  11/3/2023    Assessment Date:  23    TPN Consult:  Pt continues to have n/v and hypokalemia.  Getting zofran and phenergan routinely. Minimal po -fluid mostly.  Struggling with heartburn as well. Labs reviewed. K+ 3.4. Na+ 133, Phos 2.8, Mg 1.7. Wt 168  on . Last BM documented was . On D10 @ 50mL/hr, Agree with TPN.    TPN Goal Recommendation:  1300 dextrose calories, 95 g amino acids, 100mL 20% lipids daily  Pharmacist to monitor and replace/adjust lytes in TPN    Will cont to follow clinical course, nutrition needs.    CLINICAL NUTRITION       Reason for Assessment Physician Consult, TPN Assessment     Diagnosis/Problem   Hyperemesis Gravidarum   -27 yo  with EDC 24 presented with ongoing issues with nausea and vomiting, causing dehydration and hypokalemia/e'lyte abnormality. She has reportedly lost 30 lb with this pregnancy. Getting IV fluids and anti-nausea meds.     Medical/Surgical History Past Medical History:   Diagnosis Date    Anemia     Heart murmur     as a child    Hypoglycemia     Uterine fibroids affecting pregnancy, antepartum        Past Surgical History:   Procedure Laterality Date    DILATATION AND CURETTAGE      WISDOM TOOTH EXTRACTION          Anthropometrics        Current Height  Current Weight  BMI kg/m2 Height: 172.7 cm (68\")  Weight: 76.3 kg (168 lb 3.2 oz) (23 1800)  Body mass index is 25.57 kg/m².   Adjusted BMI (if applicable)    BMI Category Overweight (25 - 29.9)   Ideal Body Weight (IBW) 140#   Usual Body Weight (UBW) 179# 2023   Weight Trend Other: pt reported 30 lb loss?   Weight History Wt Readings from Last 30 Encounters:   23 1800 76.3 kg (168 lb 3.2 oz)   23 1116 79.4 kg (175 lb)   10/16/23 1157 84.4 kg (186 lb)   23 2239 81.2 kg (179 lb)   16 1440 62.1 kg (137 lb)   16 0906 62.1 kg (137 lb)   16 1317 " "62.1 kg (137 lb)   07/28/16 2242 62.1 kg (137 lb)        Estimated/Assessed Needs        Current Weight  Weight: 76.3 kg (168 lb 3.2 oz) (11/06/23 1800)       Energy Requirements    Weight for Calculation 79.4 kg   Method for Estimation  25 kcal/kg, 30 kcal/kg   EST Needs (kcal/day) 3447-8819       Protein Requirements    Weight for Calculation 79.4 kg   EST Protein Needs (g/kg) 1.0 - 1.2 gm/kg   EST Daily Needs (g/day) 79-95       Fluid Requirements     Method for Estimation 30 mL/kg    EST Needs (mL/day) 2382      Labs       Pertinent Labs    Results from last 7 days   Lab Units 11/11/23  0623 11/10/23  1912 11/10/23  0748 11/10/23  0549   SODIUM mmol/L 133* 134* 135* 136   POTASSIUM mmol/L 3.4* 3.0*  3.0* 2.9* 2.7*   CHLORIDE mmol/L 103 104 104 104   CO2 mmol/L 21.9* 23.0 23.0 22.9   BUN mg/dL <2* <2* <2* <2*   CREATININE mg/dL 0.33* 0.39* 0.30* 0.29*   CALCIUM mg/dL 8.8 8.8 8.5* 8.5*   BILIRUBIN mg/dL 0.3 0.3  --  0.3   ALK PHOS U/L 69 66  --  64   ALT (SGPT) U/L 6 8  --  8   AST (SGOT) U/L 8 7  --  9   GLUCOSE mg/dL 118* 133* 112* 111*     Results from last 7 days   Lab Units 11/11/23  0623 11/10/23  1912 11/10/23  0549 11/08/23  0710 11/07/23  0529 11/06/23  0335 11/05/23  1643   MAGNESIUM mg/dL 1.7  --  1.7  --  1.6   < > 1.7   PHOSPHORUS mg/dL 2.8  --   --   --   --   --   --    HEMOGLOBIN g/dL  --   --   --   --   --   --  11.8*   HEMATOCRIT %  --   --   --   --   --   --  36.5   WBC 10*3/mm3  --   --   --   --   --   --  3.64   TRIGLYCERIDES mg/dL  --  79  --   --   --   --   --    ALBUMIN g/dL 3.1* 3.1* 3.0*   < > 3.1*   < > 3.6    < > = values in this interval not displayed.     Results from last 7 days   Lab Units 11/05/23  1643   PLATELETS 10*3/mm3 241     No results found for: \"COVID19\"  No results found for: \"HGBA1C\"       Medications           Scheduled Medications diphenhydrAMINE, 25 mg, Intravenous, Q6H  famotidine, 20 mg, Intravenous, BID  ondansetron, 8 mg, Intravenous, Q8H  pantoprazole, 40 " mg, Intravenous, Q AM  promethazine, 12.5 mg, Intravenous, Q4H  sodium chloride, 10 mL, Intravenous, Q12H       Infusions dextrose, 50 mL/hr, Last Rate: 50 mL/hr (11/10/23 5857)  dextrose 5 % and sodium chloride 0.45 %, 125 mL/hr, Last Rate: 125 mL/hr (11/11/23 0048)  Pharmacy to Dose TPN,        PRN Medications   calcium carbonate    Calcium Replacement - Follow Nurse / BPA Driven Protocol    doxylamine-pyridoxine 25-25 mg combo dose    famotidine    Magnesium Standard Dose Replacement - Follow Nurse / BPA Driven Protocol    Pharmacy to Dose TPN    phenol    Phosphorus Replacement - Follow Nurse / BPA Driven Protocol    Potassium Replacement - Follow Nurse / BPA Driven Protocol    Potassium Replacement - Follow Nurse / BPA Driven Protocol    sodium chloride    sodium chloride     Physical Findings          General Findings alert, oriented, other: pregnant   Oral/Mouth Cavity other:sore throat   Edema  no edema   Gastrointestinal Nausea, vomiting, Last BM  11/5?   Skin  skin intact   Tubes/Drains/Lines none   NFPE Not indicated at this time   --  Current Nutrition Orders & Evaluation of Intake       Oral Nutrition     Food Allergies NKFA   Current PO Diet Diet: Regular/House Diet; Texture: Regular Texture (IDDSI 7); Fluid Consistency: Thin (IDDSI 0)   Supplement n/a   PO Evaluation     % PO Intake Little to no intake, aj e fluids    Factors Affecting Intake: nausea, vomiting, weakness   --  PES STATEMENT / NUTRITION DIAGNOSIS      Nutrition Dx Problem  Problem: Inadequate Oral Intake  Etiology: Medical Diagnosis - Hyperemesis Gravidarum    Signs/Symptoms: PO Diet Not Tolerated     NUTRITION INTERVENTION / PLAN OF CARE      Intervention Goal(s) Reduce/improve symptoms, Disease management/therapy, Initiate feeding/diet, and Maintain weight         RD Intervention/Action Await initiation/advancement of PO diet and Recommend/order: TPN   --      Prescription/Orders:       PO Diet       Supplements       Enteral  Nutrition       Parenteral Nutrition See below   New Prescription Ordered? No changes at this time      Parenteral Prescription:     TPN Route Pending   TPN Rate (mL/hr) Per pharmacy   TPN Recommendation: (GOAL)       Dextrose (kcal) 1300       Amino Acid (gm) 95       Lipid Concentration 20%       Lipid Volume/Frequency  100 mL, daily   Propofol Rate/Kcal    TPN Provision:   1880 kcal, 95 gm protein        Calories 98 % needs met        Protein  100 % needs met        Fluid     Prescription Ordered No, recommended         Monitor/Evaluation Per protocol, I&O, PO intake, Pertinent labs, Weight, GI status, Symptoms, POC/GOC   Discharge Plan/Needs Pending clinical course       RD to follow per protocol.  Electronically signed by:  Lexi Montelongo RD  11/11/23 09:40 EST

## 2023-11-12 ENCOUNTER — APPOINTMENT (OUTPATIENT)
Dept: ULTRASOUND IMAGING | Facility: HOSPITAL | Age: 28
End: 2023-11-12
Payer: COMMERCIAL

## 2023-11-12 ENCOUNTER — APPOINTMENT (OUTPATIENT)
Dept: GENERAL RADIOLOGY | Facility: HOSPITAL | Age: 28
End: 2023-11-12
Payer: COMMERCIAL

## 2023-11-12 LAB
ALBUMIN SERPL-MCNC: 3.2 G/DL (ref 3.5–5.2)
ALBUMIN/GLOB SERPL: 1.2 G/DL
ALP SERPL-CCNC: 68 U/L (ref 39–117)
ALT SERPL W P-5'-P-CCNC: 6 U/L (ref 1–33)
ANION GAP SERPL CALCULATED.3IONS-SCNC: 8 MMOL/L (ref 5–15)
AST SERPL-CCNC: 8 U/L (ref 1–32)
BILIRUB SERPL-MCNC: 0.3 MG/DL (ref 0–1.2)
BUN SERPL-MCNC: 5 MG/DL (ref 6–20)
BUN/CREAT SERPL: 13.2 (ref 7–25)
CALCIUM SPEC-SCNC: 8.9 MG/DL (ref 8.6–10.5)
CHLORIDE SERPL-SCNC: 104 MMOL/L (ref 98–107)
CO2 SERPL-SCNC: 21 MMOL/L (ref 22–29)
CREAT SERPL-MCNC: 0.38 MG/DL (ref 0.57–1)
EGFRCR SERPLBLD CKD-EPI 2021: 138.4 ML/MIN/1.73
GLOBULIN UR ELPH-MCNC: 2.7 GM/DL
GLUCOSE SERPL-MCNC: 105 MG/DL (ref 65–99)
HCG INTACT+B SERPL-ACNC: NORMAL MIU/ML
MAGNESIUM SERPL-MCNC: 1.6 MG/DL (ref 1.6–2.6)
PHOSPHATE SERPL-MCNC: 3 MG/DL (ref 2.5–4.5)
POTASSIUM SERPL-SCNC: 3.5 MMOL/L (ref 3.5–5.2)
POTASSIUM SERPL-SCNC: 3.5 MMOL/L (ref 3.5–5.2)
POTASSIUM SERPL-SCNC: 3.7 MMOL/L (ref 3.5–5.2)
PROT SERPL-MCNC: 5.9 G/DL (ref 6–8.5)
SODIUM SERPL-SCNC: 133 MMOL/L (ref 136–145)

## 2023-11-12 PROCEDURE — 25010000002 POTASSIUM CHLORIDE PER 2 MEQ OF POTASSIUM: Performed by: OBSTETRICS & GYNECOLOGY

## 2023-11-12 PROCEDURE — 25010000002 POTASSIUM CHLORIDE 10 MEQ/100ML SOLUTION: Performed by: OBSTETRICS & GYNECOLOGY

## 2023-11-12 PROCEDURE — 25010000002 PROMETHAZINE PER 50 MG: Performed by: OBSTETRICS & GYNECOLOGY

## 2023-11-12 PROCEDURE — 25010000002 MAGNESIUM SULFATE PER 500 MG OF MAGNESIUM: Performed by: OBSTETRICS & GYNECOLOGY

## 2023-11-12 PROCEDURE — 25010000002 DIPHENHYDRAMINE PER 50 MG: Performed by: OBSTETRICS & GYNECOLOGY

## 2023-11-12 PROCEDURE — 76815 OB US LIMITED FETUS(S): CPT

## 2023-11-12 PROCEDURE — 84100 ASSAY OF PHOSPHORUS: CPT | Performed by: OBSTETRICS & GYNECOLOGY

## 2023-11-12 PROCEDURE — 25010000002 ONDANSETRON PER 1 MG: Performed by: PHYSICIAN ASSISTANT

## 2023-11-12 PROCEDURE — 25010000002 CALCIUM GLUCONATE PER 10 ML: Performed by: OBSTETRICS & GYNECOLOGY

## 2023-11-12 PROCEDURE — 84702 CHORIONIC GONADOTROPIN TEST: CPT | Performed by: OBSTETRICS & GYNECOLOGY

## 2023-11-12 PROCEDURE — 71045 X-RAY EXAM CHEST 1 VIEW: CPT

## 2023-11-12 PROCEDURE — 80053 COMPREHEN METABOLIC PANEL: CPT | Performed by: OBSTETRICS & GYNECOLOGY

## 2023-11-12 PROCEDURE — 83735 ASSAY OF MAGNESIUM: CPT | Performed by: OBSTETRICS & GYNECOLOGY

## 2023-11-12 PROCEDURE — 84132 ASSAY OF SERUM POTASSIUM: CPT | Performed by: OBSTETRICS & GYNECOLOGY

## 2023-11-12 RX ORDER — POTASSIUM CHLORIDE 7.45 MG/ML
10 INJECTION INTRAVENOUS
Status: DISPENSED | OUTPATIENT
Start: 2023-11-12 | End: 2023-11-12

## 2023-11-12 RX ADMIN — POTASSIUM CHLORIDE 10 MEQ: 7.46 INJECTION, SOLUTION INTRAVENOUS at 01:34

## 2023-11-12 RX ADMIN — DIPHENHYDRAMINE HYDROCHLORIDE 25 MG: 50 INJECTION, SOLUTION INTRAMUSCULAR; INTRAVENOUS at 06:18

## 2023-11-12 RX ADMIN — Medication 10 ML: at 20:08

## 2023-11-12 RX ADMIN — ONDANSETRON 8 MG: 2 INJECTION INTRAMUSCULAR; INTRAVENOUS at 08:04

## 2023-11-12 RX ADMIN — I.V. FAT EMULSION 20 G: 20 EMULSION INTRAVENOUS at 19:21

## 2023-11-12 RX ADMIN — DIPHENHYDRAMINE HYDROCHLORIDE 25 MG: 50 INJECTION, SOLUTION INTRAMUSCULAR; INTRAVENOUS at 12:09

## 2023-11-12 RX ADMIN — DIPHENHYDRAMINE HYDROCHLORIDE 25 MG: 50 INJECTION, SOLUTION INTRAMUSCULAR; INTRAVENOUS at 20:04

## 2023-11-12 RX ADMIN — Medication 10 ML: at 08:04

## 2023-11-12 RX ADMIN — Medication 10 ML: at 08:05

## 2023-11-12 RX ADMIN — DIPHENHYDRAMINE HYDROCHLORIDE 25 MG: 50 INJECTION, SOLUTION INTRAMUSCULAR; INTRAVENOUS at 00:30

## 2023-11-12 RX ADMIN — Medication 10 ML: at 20:09

## 2023-11-12 RX ADMIN — POTASSIUM CHLORIDE 10 MEQ: 7.46 INJECTION, SOLUTION INTRAVENOUS at 14:53

## 2023-11-12 RX ADMIN — POTASSIUM CHLORIDE 10 MEQ: 7.46 INJECTION, SOLUTION INTRAVENOUS at 16:41

## 2023-11-12 RX ADMIN — POTASSIUM CHLORIDE 10 MEQ: 7.46 INJECTION, SOLUTION INTRAVENOUS at 02:37

## 2023-11-12 RX ADMIN — Medication 10 ML: at 08:06

## 2023-11-12 RX ADMIN — PROMETHAZINE HYDROCHLORIDE 12.5 MG: 25 INJECTION INTRAMUSCULAR; INTRAVENOUS at 06:18

## 2023-11-12 RX ADMIN — FAMOTIDINE 20 MG: 10 INJECTION INTRAVENOUS at 08:14

## 2023-11-12 RX ADMIN — FAMOTIDINE 20 MG: 10 INJECTION INTRAVENOUS at 20:08

## 2023-11-12 RX ADMIN — PANTOPRAZOLE SODIUM 40 MG: 40 INJECTION, POWDER, FOR SOLUTION INTRAVENOUS at 06:18

## 2023-11-12 RX ADMIN — MAGNESIUM SULFATE HEPTAHYDRATE: 500 INJECTION, SOLUTION INTRAMUSCULAR; INTRAVENOUS at 18:59

## 2023-11-12 RX ADMIN — PROMETHAZINE HYDROCHLORIDE 12.5 MG: 25 INJECTION INTRAMUSCULAR; INTRAVENOUS at 01:10

## 2023-11-12 RX ADMIN — POTASSIUM CHLORIDE 10 MEQ: 7.46 INJECTION, SOLUTION INTRAVENOUS at 13:46

## 2023-11-12 RX ADMIN — ONDANSETRON 8 MG: 2 INJECTION INTRAMUSCULAR; INTRAVENOUS at 16:41

## 2023-11-12 RX ADMIN — ONDANSETRON 8 MG: 2 INJECTION INTRAMUSCULAR; INTRAVENOUS at 00:30

## 2023-11-12 RX ADMIN — PROMETHAZINE HYDROCHLORIDE 12.5 MG: 25 INJECTION INTRAMUSCULAR; INTRAVENOUS at 09:35

## 2023-11-12 NOTE — PLAN OF CARE
Goal Outcome Evaluation:  Plan of Care Reviewed With: patient        Progress: no change  Outcome Evaluation: VSS, double lumen PICC line placed and TPN started this shift, tolerating well, potassium replaced, timed lab ordered for 0730 this morning, daily weight

## 2023-11-12 NOTE — PROGRESS NOTES
[facility] Clinical Pharmacy Services: TPN Daily Progress Note    TPN Day # 2   Indication:  Severe hyperemesis gravidarum  Route: central  Type: standard    Subjective/Objective  Results from last 7 days   Lab Units 23  0725 23  0623 11/10/23  1912   SODIUM mmol/L 133*   < > 134*   POTASSIUM mmol/L 3.5  3.5   < > 3.0*  3.0*   CHLORIDE mmol/L 104   < > 104   CO2 mmol/L 21.0*   < > 23.0   BUN mg/dL 5*   < > <2*   CREATININE mg/dL 0.38*   < > 0.39*   CALCIUM mg/dL 8.9   < > 8.8   ALBUMIN g/dL 3.2*   < > 3.1*   BILIRUBIN mg/dL 0.3   < > 0.3   ALK PHOS U/L 68   < > 66   ALT (SGPT) U/L 6   < > 8   AST (SGOT) U/L 8   < > 7   GLUCOSE mg/dL 105*   < > 133*   MAGNESIUM mg/dL 1.6   < >  --    PHOSPHORUS mg/dL 3.0   < >  --    TRIGLYCERIDES mg/dL  --   --  79    < > = values in this interval not displayed.        Diet Orders (active) (From admission, onward)       Start     Ordered    23 1111  Diet: Regular/House Diet; Texture: Regular Texture (IDDSI 7); Fluid Consistency: Thin (IDDSI 0)  Diet Effective Now        Comments: As tolerated.    23 1110                  Additional insulin administration while previous TPN infusin units  Additional electrolyte administration while previous TPN infusing: none  Acid suppression: famotidine 20 mg IV q12h    Goal TPN Formula Recommendations: /100 AA/Dex/Lipids; 1880 kcal/day  Current TPN Formula: 80/500/0 AA/Dex/Lipids    Assessment/Plan    Macronutrients: will plan to increase amino acids/lipids to goal and increase dextrose to 500 kcal today  Electrolytes/Additives:               Sodium Chloride: 70 mEq              Sodium Acetate: 0 mEq              Sodium Phosphate: 15 mEq              Potassium Chloride: 50 mEq              Potassium Acetate: 0 mEq              Potassium Phosphate: 0 mEq              Calcium Gluconate: 9 mEq              Magnesium Sulfate: 12 mEq  MVI and trace  Labs: CMP, Phos, Mg daily  Comments: N/a    Raheem Manriquez  PharmD, BCPS, BCOP  Clinical Staff Pharmacist

## 2023-11-12 NOTE — PROGRESS NOTES
[facility]  [facility] 1995  MRN: [id number]  CSN: [id number]    Hospital Day: 10    CC: hospital follow-up for hyperemesis gravidarum    Antepartum Progress Note    Subjective   The patient is a 28 year old  @ 10w1d admitted for hyperemesis.  She is now receiving TPN through a PICC line and Protonix was added to her medication regimen.  Patient now states that she is feeling better than she has felt during the entire admission.  She has not vomited today and has tolerated very small amounts of oral intake so far.  She had her first bowel movement since admission this morning.  The stool was loose with some formed parts.    She denies cramping or bleeding.    Review of Systems  Negative except as per HPI     Objective     Min/max vitals past 24 hours:   Temp  Min: 97.8 °F (36.6 °C)  Max: 98.5 °F (36.9 °C)  BP  Min: 114/69  Max: 138/76  Pulse  Min: 75  Max: 101  Resp  Min: 16  Max: 18         General: well developed; well nourished  no acute distress   Heart: regular rate and rhythm, S1, S2 normal, no murmur, click, rub or gallop   Lungs: breathing is unlabored   Abdomen: soft, non-tender; no masses       Cervix: was not checked.                   Assessment   IUP at 11w1d  Hyperemesis gravidarum  Hypokalemia  GERD       Plan   Continue current management  Will request MFM consult tomorrow.        Venice Kang MD  2023  10:06 EST

## 2023-11-13 ENCOUNTER — APPOINTMENT (OUTPATIENT)
Dept: CARDIOLOGY | Facility: HOSPITAL | Age: 28
End: 2023-11-13
Payer: COMMERCIAL

## 2023-11-13 ENCOUNTER — APPOINTMENT (OUTPATIENT)
Dept: CT IMAGING | Facility: HOSPITAL | Age: 28
End: 2023-11-13
Payer: COMMERCIAL

## 2023-11-13 LAB
ALBUMIN SERPL-MCNC: 3.1 G/DL (ref 3.5–5.2)
ALBUMIN/GLOB SERPL: 1.1 G/DL
ALP SERPL-CCNC: 68 U/L (ref 39–117)
ALT SERPL W P-5'-P-CCNC: 9 U/L (ref 1–33)
ANION GAP SERPL CALCULATED.3IONS-SCNC: 9 MMOL/L (ref 5–15)
AST SERPL-CCNC: 11 U/L (ref 1–32)
BH CV UPPER VENOUS LEFT AXILLARY AUGMENT: NORMAL
BH CV UPPER VENOUS LEFT AXILLARY COLOR: 1
BH CV UPPER VENOUS LEFT AXILLARY COMPRESS: NORMAL
BH CV UPPER VENOUS LEFT AXILLARY PHASIC: NORMAL
BH CV UPPER VENOUS LEFT AXILLARY SPONT: NORMAL
BH CV UPPER VENOUS LEFT AXILLARY THROMBUS: NORMAL
BH CV UPPER VENOUS LEFT BASILIC FOREARM COMPRESS: NORMAL
BH CV UPPER VENOUS LEFT BASILIC UPPER COLOR: 1
BH CV UPPER VENOUS LEFT BASILIC UPPER COMPRESS: NORMAL
BH CV UPPER VENOUS LEFT BASILIC UPPER THROMBUS: NORMAL
BH CV UPPER VENOUS LEFT BRACHIAL COMPRESS: NORMAL
BH CV UPPER VENOUS LEFT CEPHALIC FOREARM COLOR: 1
BH CV UPPER VENOUS LEFT CEPHALIC FOREARM COMPRESS: NORMAL
BH CV UPPER VENOUS LEFT CEPHALIC FOREARM THROMBUS: NORMAL
BH CV UPPER VENOUS LEFT CEPHALIC UPPER COMPRESS: NORMAL
BH CV UPPER VENOUS LEFT INTERNAL JUGULAR AUGMENT: NORMAL
BH CV UPPER VENOUS LEFT INTERNAL JUGULAR COMPRESS: NORMAL
BH CV UPPER VENOUS LEFT INTERNAL JUGULAR PHASIC: NORMAL
BH CV UPPER VENOUS LEFT INTERNAL JUGULAR SPONT: NORMAL
BH CV UPPER VENOUS LEFT RADIAL COMPRESS: NORMAL
BH CV UPPER VENOUS LEFT SUBCLAVIAN AUGMENT: NORMAL
BH CV UPPER VENOUS LEFT SUBCLAVIAN COMPRESS: NORMAL
BH CV UPPER VENOUS LEFT SUBCLAVIAN PHASIC: NORMAL
BH CV UPPER VENOUS LEFT SUBCLAVIAN SPONT: NORMAL
BH CV UPPER VENOUS LEFT ULNAR COMPRESS: NORMAL
BH CV UPPER VENOUS RIGHT INTERNAL JUGULAR AUGMENT: NORMAL
BH CV UPPER VENOUS RIGHT INTERNAL JUGULAR COMPRESS: NORMAL
BH CV UPPER VENOUS RIGHT INTERNAL JUGULAR PHASIC: NORMAL
BH CV UPPER VENOUS RIGHT INTERNAL JUGULAR SPONT: NORMAL
BH CV UPPER VENOUS RIGHT SUBCLAVIAN AUGMENT: NORMAL
BH CV UPPER VENOUS RIGHT SUBCLAVIAN COMPRESS: NORMAL
BH CV UPPER VENOUS RIGHT SUBCLAVIAN PHASIC: NORMAL
BH CV UPPER VENOUS RIGHT SUBCLAVIAN SPONT: NORMAL
BH CV VAS PRELIMINARY FINDINGS SCRIPTING: 1
BILIRUB SERPL-MCNC: <0.2 MG/DL (ref 0–1.2)
BUN SERPL-MCNC: 7 MG/DL (ref 6–20)
BUN/CREAT SERPL: 18.4 (ref 7–25)
CALCIUM SPEC-SCNC: 8.7 MG/DL (ref 8.6–10.5)
CHLORIDE SERPL-SCNC: 104 MMOL/L (ref 98–107)
CO2 SERPL-SCNC: 18 MMOL/L (ref 22–29)
CREAT SERPL-MCNC: 0.38 MG/DL (ref 0.57–1)
EGFRCR SERPLBLD CKD-EPI 2021: 138.4 ML/MIN/1.73
GLOBULIN UR ELPH-MCNC: 2.7 GM/DL
GLUCOSE SERPL-MCNC: 112 MG/DL (ref 65–99)
MAGNESIUM SERPL-MCNC: 1.9 MG/DL (ref 1.6–2.6)
PHOSPHATE SERPL-MCNC: 2.8 MG/DL (ref 2.5–4.5)
POTASSIUM SERPL-SCNC: 4.3 MMOL/L (ref 3.5–5.2)
PROT SERPL-MCNC: 5.8 G/DL (ref 6–8.5)
SODIUM SERPL-SCNC: 131 MMOL/L (ref 136–145)

## 2023-11-13 PROCEDURE — 25010000002 ONDANSETRON PER 1 MG: Performed by: OBSTETRICS & GYNECOLOGY

## 2023-11-13 PROCEDURE — 25010000002 ONDANSETRON PER 1 MG: Performed by: PHYSICIAN ASSISTANT

## 2023-11-13 PROCEDURE — 93971 EXTREMITY STUDY: CPT

## 2023-11-13 PROCEDURE — 93010 ELECTROCARDIOGRAM REPORT: CPT | Performed by: INTERNAL MEDICINE

## 2023-11-13 PROCEDURE — 83735 ASSAY OF MAGNESIUM: CPT | Performed by: OBSTETRICS & GYNECOLOGY

## 2023-11-13 PROCEDURE — 25010000002 THIAMINE HCL 200 MG/2ML SOLUTION 2 ML VIAL: Performed by: OBSTETRICS & GYNECOLOGY

## 2023-11-13 PROCEDURE — 25010000002 POTASSIUM CHLORIDE PER 2 MEQ OF POTASSIUM: Performed by: OBSTETRICS & GYNECOLOGY

## 2023-11-13 PROCEDURE — 99253 IP/OBS CNSLTJ NEW/EST LOW 45: CPT | Performed by: OBSTETRICS & GYNECOLOGY

## 2023-11-13 PROCEDURE — 25510000001 IOPAMIDOL PER 1 ML: Performed by: OBSTETRICS & GYNECOLOGY

## 2023-11-13 PROCEDURE — 71275 CT ANGIOGRAPHY CHEST: CPT

## 2023-11-13 PROCEDURE — 25010000002 ENOXAPARIN PER 10 MG: Performed by: OBSTETRICS & GYNECOLOGY

## 2023-11-13 PROCEDURE — 93005 ELECTROCARDIOGRAM TRACING: CPT | Performed by: OBSTETRICS & GYNECOLOGY

## 2023-11-13 PROCEDURE — 80053 COMPREHEN METABOLIC PANEL: CPT | Performed by: OBSTETRICS & GYNECOLOGY

## 2023-11-13 PROCEDURE — 25010000002 DIPHENHYDRAMINE PER 50 MG: Performed by: OBSTETRICS & GYNECOLOGY

## 2023-11-13 PROCEDURE — 84100 ASSAY OF PHOSPHORUS: CPT | Performed by: OBSTETRICS & GYNECOLOGY

## 2023-11-13 RX ORDER — CYCLOBENZAPRINE HCL 10 MG
10 TABLET ORAL 3 TIMES DAILY PRN
Status: DISCONTINUED | OUTPATIENT
Start: 2023-11-13 | End: 2023-11-15 | Stop reason: HOSPADM

## 2023-11-13 RX ORDER — ONDANSETRON 2 MG/ML
4 INJECTION INTRAMUSCULAR; INTRAVENOUS EVERY 4 HOURS
Status: DISCONTINUED | OUTPATIENT
Start: 2023-11-13 | End: 2023-11-15 | Stop reason: HOSPADM

## 2023-11-13 RX ORDER — METOCLOPRAMIDE 10 MG/1
10 TABLET ORAL
Status: DISCONTINUED | OUTPATIENT
Start: 2023-11-13 | End: 2023-11-15 | Stop reason: HOSPADM

## 2023-11-13 RX ORDER — PANTOPRAZOLE SODIUM 40 MG/10ML
40 INJECTION, POWDER, LYOPHILIZED, FOR SOLUTION INTRAVENOUS EVERY 12 HOURS SCHEDULED
Status: DISCONTINUED | OUTPATIENT
Start: 2023-11-13 | End: 2023-11-13 | Stop reason: SDUPTHER

## 2023-11-13 RX ORDER — SCOLOPAMINE TRANSDERMAL SYSTEM 1 MG/1
1 PATCH, EXTENDED RELEASE TRANSDERMAL
Status: DISCONTINUED | OUTPATIENT
Start: 2023-11-13 | End: 2023-11-15 | Stop reason: HOSPADM

## 2023-11-13 RX ORDER — ENOXAPARIN SODIUM 100 MG/ML
1 INJECTION SUBCUTANEOUS EVERY 12 HOURS
Status: DISCONTINUED | OUTPATIENT
Start: 2023-11-13 | End: 2023-11-15 | Stop reason: HOSPADM

## 2023-11-13 RX ADMIN — ONDANSETRON 8 MG: 2 INJECTION INTRAMUSCULAR; INTRAVENOUS at 08:06

## 2023-11-13 RX ADMIN — DIPHENHYDRAMINE HYDROCHLORIDE 25 MG: 50 INJECTION, SOLUTION INTRAMUSCULAR; INTRAVENOUS at 06:09

## 2023-11-13 RX ADMIN — ENOXAPARIN SODIUM 70 MG: 100 INJECTION SUBCUTANEOUS at 12:28

## 2023-11-13 RX ADMIN — DIPHENHYDRAMINE HYDROCHLORIDE 25 MG: 50 INJECTION, SOLUTION INTRAMUSCULAR; INTRAVENOUS at 19:52

## 2023-11-13 RX ADMIN — FAMOTIDINE 20 MG: 10 INJECTION INTRAVENOUS at 08:06

## 2023-11-13 RX ADMIN — IOPAMIDOL 62 ML: 755 INJECTION, SOLUTION INTRAVENOUS at 18:06

## 2023-11-13 RX ADMIN — ONDANSETRON 4 MG: 2 INJECTION INTRAMUSCULAR; INTRAVENOUS at 16:46

## 2023-11-13 RX ADMIN — ONDANSETRON 8 MG: 2 INJECTION INTRAMUSCULAR; INTRAVENOUS at 00:49

## 2023-11-13 RX ADMIN — FAMOTIDINE 20 MG: 10 INJECTION INTRAVENOUS at 20:49

## 2023-11-13 RX ADMIN — DIPHENHYDRAMINE HYDROCHLORIDE 25 MG: 50 INJECTION, SOLUTION INTRAMUSCULAR; INTRAVENOUS at 14:04

## 2023-11-13 RX ADMIN — Medication 10 ML: at 20:02

## 2023-11-13 RX ADMIN — PANTOPRAZOLE SODIUM 40 MG: 40 INJECTION, POWDER, FOR SOLUTION INTRAVENOUS at 06:09

## 2023-11-13 RX ADMIN — ONDANSETRON 4 MG: 2 INJECTION INTRAMUSCULAR; INTRAVENOUS at 20:49

## 2023-11-13 RX ADMIN — SCOPALAMINE 1 PATCH: 1 PATCH, EXTENDED RELEASE TRANSDERMAL at 18:37

## 2023-11-13 RX ADMIN — POTASSIUM CHLORIDE 999 ML/HR: 2 INJECTION, SOLUTION, CONCENTRATE INTRAVENOUS at 18:38

## 2023-11-13 RX ADMIN — DIPHENHYDRAMINE HYDROCHLORIDE 25 MG: 50 INJECTION, SOLUTION INTRAMUSCULAR; INTRAVENOUS at 00:49

## 2023-11-13 NOTE — PLAN OF CARE
Goal Outcome Evaluation:  Plan of Care Reviewed With: patient           Outcome Evaluation: VSS. TPN & Lipids infusing thur Picc line, Zofran given, refused Phenergan , c/o gastric reflux but no vomiting.

## 2023-11-13 NOTE — PAYOR COMM NOTE
"Osbaldo Ralph (28 y.o. Female)       Date of Birth   1995    Social Security Number       Address   [address] [facility] [address]    Home Phone   [phone]    MRN   [id number]       [place]   None    Marital Status   Single                            Admission Date   11/3/23    Admission Type   Emergency    Admitting Provider   Jaycee Owusu MD    Attending Provider   Jaycee Owusu MD    Department, Room/Bed   [address], 78/1       Discharge Date       Discharge Disposition       Discharge Destination                                 Attending Provider: Jaycee Owusu MD    Allergies: No Known Allergies    Isolation: None   Infection: None   Code Status: CPR    Ht: 172.7 cm (68\")   Wt: 72.4 kg (159 lb 9.8 oz)    Admission Cmt: None   Principal Problem: Hyperemesis gravidarum [O21.0]                   Active Insurance as of 11/3/2023       Primary Coverage       Payor Plan Insurance Group Employer/Plan Group    [employer] [id number]       Payor Plan Address Payor Plan Phone Number Payor Plan Fax Number Effective Dates    [address] [address]   1/1/2021 - None Entered    [facility] [id number]         Subscriber Name Subscriber Birth Date Member ID       OSBALDO RALPH 1995 [id number]                     Emergency Contacts        (Rel.) Home Phone Work Phone Mobile Phone    Carolyn Ralph (Mother) -- -- [phone]              Insurance Information                  [employer] BY [employer]STRO Phone: --    Subscriber: Osbaldo Ralph Subscriber#: [id number]    Group#: [id number] Precert#: --          Problem List             Codes Noted - Resolved       Hospital    Pregnancy ICD-10-CM: Z34.90  ICD-9-CM: V22.2 11/5/2023 - Present    * (Principal) Hyperemesis gravidarum ICD-10-CM: O21.0  ICD-9-CM: 643.00 10/16/2023 - Present     Facility-Administered Medications as of " 2023   Medication Dose Route Frequency Provider Last Rate Last Admin    [COMPLETED] acetaminophen (TYLENOL) tablet 650 mg  650 mg Oral Once Stephane Garza MD   650 mg at 23 0950    Adult Central 2-in-1 TPN   Intravenous Continuous Jaycee Owusu MD 75 mL/hr at 23 185 New Bag at 23    [] Adult Peripheral or Midline 2-in-1 TPN   Intravenous Continuous Jaycee Owusu MD 75 mL/hr at 23 New Bag at 23    calcium carbonate (TUMS) chewable tablet 500 mg (200 mg elemental)  2 tablet Oral TID PRN Rosaura Zavala MD   2 tablet at 23 0950    Calcium Replacement - Follow Nurse / BPA Driven Protocol   Does not apply PRN Rosaura Zavala MD        dextrose 10 % infusion  50 mL/hr Intravenous Continuous Rosaura Zavala MD 50 mL/hr at 11/10/23 1807 50 mL/hr at 11/10/23 1807    dextrose 5 % and sodium chloride 0.45 % infusion  125 mL/hr Intravenous Continuous Sharri Bain  mL/hr at 23 125 mL/hr at 23    diphenhydrAMINE (BENADRYL) injection 25 mg  25 mg Intravenous Q6H Venice Kang MD   25 mg at 23 0609    doxylamine-pyridoxine 25-25 mg combo dose   Oral BID PRN Rosaura Zavala MD        Enoxaparin Sodium (LOVENOX) syringe 70 mg  1 mg/kg Subcutaneous Q12H Stephane Garza MD        [COMPLETED] famotidine (PEPCID) injection 20 mg  20 mg Intravenous Once Jeffry Srivastava PA   20 mg at 23 0919    [COMPLETED] famotidine (PEPCID) injection 20 mg  20 mg Intravenous Once Sharri Bain PA   20 mg at 23 0906    famotidine (PEPCID) injection 20 mg  20 mg Intravenous Q12H PRN Jaycee Owusu MD   20 mg at 23    famotidine (PEPCID) injection 20 mg  20 mg Intravenous BID Rosaura Zavala MD   20 mg at 23 0806    Fat Emulsion Plant Based (Soy) 20 % infusion 20 g  100 mL Intravenous Q24H (TPN) Jaycee Owusu MD   20 g at 23    Magnesium Standard Dose  Replacement - Follow Nurse / BPA Driven Protocol   Does not apply PRN Rosaura Zavala MD        [COMPLETED] ondansetron (ZOFRAN) injection 4 mg  4 mg Intravenous Once Jeffry Srivastava PA   4 mg at 23 0800    [COMPLETED] ondansetron (ZOFRAN) injection 4 mg  4 mg Intravenous Once Jeffry Srivastava PA   4 mg at 23 0919    ondansetron (ZOFRAN) injection 8 mg  8 mg Intravenous Q8H Sharri Bain PA   8 mg at 23 0806    pantoprazole (PROTONIX) injection 40 mg  40 mg Intravenous Q AM Rosaura Zavala MD   40 mg at 23 0609    Pharmacy to Dose TPN   Does not apply Continuous PRN Rosaura Zavala MD        phenol (CHLORASEPTIC) 1.4 % liquid 1 spray  1 spray Mouth/Throat Q2H PRN Rosaura Zavala MD   1 spray at 23 0406    Phosphorus Replacement - Follow Nurse / BPA Driven Protocol   Does not apply PRN Rosaura Zavala MD        [] potassium chloride 10 mEq in 100 mL IVPB  10 mEq Intravenous Q1H Sharri Bain  mL/hr at 23 1015 10 mEq at 23 1015    [COMPLETED] potassium chloride 10 mEq in 100 mL IVPB  10 mEq Intravenous Once Cherelle Panda APRN 100 mL/hr at 23 2232 10 mEq at 23 2232    [COMPLETED] potassium chloride 10 mEq in 100 mL IVPB  10 mEq Intravenous Once Cherelle Panda APRN 100 mL/hr at 23 0103 10 mEq at 23 0103    [COMPLETED] potassium chloride 10 mEq in 100 mL IVPB  10 mEq Intravenous Once Cherelle Panda APRN 100 mL/hr at 23 0549 10 mEq at 23 0549    [COMPLETED] potassium chloride 10 mEq in 100 mL IVPB  10 mEq Intravenous Q1H Jaycee Owusu  mL/hr at 237 10 mEq at 23    [COMPLETED] potassium chloride 10 mEq in 100 mL IVPB  10 mEq Intravenous Q1H Jaycee Owusu  mL/hr at 23 1016 10 mEq at 23 1016    [COMPLETED] potassium chloride 10 mEq in 100 mL IVPB  10 mEq Intravenous Q1H Stephane Garza MD   Stopped at 23    [COMPLETED] potassium  chloride 10 mEq in 100 mL IVPB  10 mEq Intravenous Q1H Venice Kang MD   Stopped at 23 1743    [COMPLETED] potassium chloride 10 mEq in 100 mL IVPB  10 mEq Intravenous Q1H Jaycee Owusu  mL/hr at 11/10/23 1450 10 mEq at 11/10/23 1450    [COMPLETED] potassium chloride 10 mEq in 100 mL IVPB  10 mEq Intravenous Q1H Jaycee Owusu  mL/hr at 23 0728 10 mEq at 23 0728    [COMPLETED] potassium chloride 10 mEq in 100 mL IVPB  10 mEq Intravenous Q1H Stephane Garza  mL/hr at 23 0237 10 mEq at 23 0237    [] potassium chloride 10 mEq in 100 mL IVPB  10 mEq Intravenous Q1H Jaycee Owusu  mL/hr at 23 1641 10 mEq at 23 1641    Potassium Replacement - Follow Nurse / BPA Driven Protocol   Does not apply PRN Rosaura Zavala MD        Potassium Replacement - Follow Nurse / BPA Driven Protocol   Does not apply PRN Rosaura Zavala MD        promethazine (PHENERGAN) 12.5 mg in sodium chloride 0.9 % 50 mL  12.5 mg Intravenous Q4H Venice Kang MD 0 mL/hr at 23 2038 12.5 mg at 23 0935    [COMPLETED] sodium chloride 0.9 % bolus 1,000 mL  1,000 mL Intravenous Once Jeffry Srivastava PA   Stopped at 23 1031    sodium chloride 0.9 % flush 10 mL  10 mL Intravenous Q12H Joselyn Willis APRN   10 mL at 23    sodium chloride 0.9 % flush 10 mL  10 mL Intravenous PRN Joselyn Willis APRN        sodium chloride 0.9 % flush 10 mL  10 mL Intravenous Q12H Stephane Garza MD   10 mL at 23    sodium chloride 0.9 % flush 10 mL  10 mL Intravenous Q12H Stephane Garza MD   10 mL at 23    sodium chloride 0.9 % flush 10 mL  10 mL Intravenous PRN Stephane Garza MD        sodium chloride 0.9 % flush 20 mL  20 mL Intravenous PRN Stephane Garza MD        sodium chloride 0.9 % infusion 40 mL  40 mL Intravenous PRN Joselyn Willis APRN        [COMPLETED] sodium chloride 0.9 % with KCl 20  mEq/L infusion  125 mL/hr Intravenous Once Rosaura Zavala  mL/hr at 11/07/23 1142 125 mL/hr at 11/07/23 1142     Lab Results (last 72 hours)       Procedure Component Value Units Date/Time    Comprehensive Metabolic Panel [[id number]]  (Abnormal) Collected: 11/13/23 0508    Specimen: Blood Updated: 11/13/23 0613     Glucose 112 mg/dL      BUN 7 mg/dL      Creatinine 0.38 mg/dL      Sodium 131 mmol/L      Potassium 4.3 mmol/L      Comment: Slight hemolysis detected by analyzer. Result may be falsely elevated.        Chloride 104 mmol/L      CO2 18.0 mmol/L      Calcium 8.7 mg/dL      Total Protein 5.8 g/dL      Albumin 3.1 g/dL      ALT (SGPT) 9 U/L      AST (SGOT) 11 U/L      Alkaline Phosphatase 68 U/L      Total Bilirubin <0.2 mg/dL      Globulin 2.7 gm/dL      A/G Ratio 1.1 g/dL      BUN/Creatinine Ratio 18.4     Anion Gap 9.0 mmol/L      eGFR 138.4 mL/min/1.73     Narrative:      GFR Normal >60  Chronic Kidney Disease <60  Kidney Failure <15      Magnesium [[id number]]  (Normal) Collected: 11/13/23 0508    Specimen: Blood Updated: 11/13/23 0613     Magnesium 1.9 mg/dL     Phosphorus [[id number]]  (Normal) Collected: 11/13/23 0508    Specimen: Blood Updated: 11/13/23 0611     Phosphorus 2.8 mg/dL     Potassium [[id number]]  (Normal) Collected: 11/12/23 2231    Specimen: Blood Updated: 11/12/23 2320     Potassium 3.7 mmol/L     hCG, Quantitative, Pregnancy [[id number]] Collected: 11/12/23 0725    Specimen: Blood Updated: 11/12/23 0853     HCG Quantitative 68,725.00 mIU/mL     Narrative:      HCG Ranges by Gestational Age    Females - non-pregnant premenopausal   </= 1mIU/mL HCG  Females - postmenopausal               </= 7mIU/mL HCG    3 Weeks       5.4   -      72 mIU/mL  4 Weeks      10.2   -     708 mIU/mL  5 Weeks       217   -   8,245 mIU/mL  6 Weeks       152   -  32,177 mIU/mL  7 Weeks     4,059   - 153,767 mIU/mL  8 Weeks    31,366   - 149,094 mIU/mL  9 Weeks    59,109   - 135,901 mIU/mL  10 Weeks    44,186   - 170,409 mIU/mL  12 Weeks   27,107   - 201,615 mIU/mL  14 Weeks   24,302   -  93,646 mIU/mL  15 Weeks   12,540   -  69,747 mIU/mL  16 Weeks    8,904   -  55,332 mIU/mL  17 Weeks    8,240   -  51,793 mIU/mL  18 Weeks    9,649   -  55,271 mIU/mL    Results may be falsely decreased if patient taking Biotin.      Comprehensive Metabolic Panel [[id number]]  (Abnormal) Collected: 11/12/23 0725    Specimen: Blood Updated: 11/12/23 0851     Glucose 105 mg/dL      BUN 5 mg/dL      Creatinine 0.38 mg/dL      Sodium 133 mmol/L      Potassium 3.5 mmol/L      Chloride 104 mmol/L      CO2 21.0 mmol/L      Calcium 8.9 mg/dL      Total Protein 5.9 g/dL      Albumin 3.2 g/dL      ALT (SGPT) 6 U/L      AST (SGOT) 8 U/L      Alkaline Phosphatase 68 U/L      Total Bilirubin 0.3 mg/dL      Globulin 2.7 gm/dL      A/G Ratio 1.2 g/dL      BUN/Creatinine Ratio 13.2     Anion Gap 8.0 mmol/L      eGFR 138.4 mL/min/1.73     Narrative:      GFR Normal >60  Chronic Kidney Disease <60  Kidney Failure <15      Magnesium [[id number]]  (Normal) Collected: 11/12/23 0725    Specimen: Blood Updated: 11/12/23 0851     Magnesium 1.6 mg/dL     Phosphorus [[id number]]  (Normal) Collected: 11/12/23 0725    Specimen: Blood Updated: 11/12/23 0849     Phosphorus 3.0 mg/dL     Potassium [[id number]]  (Normal) Collected: 11/12/23 0725    Specimen: Blood Updated: 11/12/23 0849     Potassium 3.5 mmol/L     Potassium [[id number]]  (Abnormal) Collected: 11/11/23 1327    Specimen: Blood from Hand, Left Updated: 11/11/23 1356     Potassium 3.3 mmol/L     Magnesium [[id number]]  (Normal) Collected: 11/11/23 0623    Specimen: Blood Updated: 11/11/23 0827     Magnesium 1.7 mg/dL     Comprehensive Metabolic Panel [[id number]]  (Abnormal) Collected: 11/11/23 0623    Specimen: Blood Updated: 11/11/23 0827     Glucose 118 mg/dL      BUN <2 mg/dL      Creatinine 0.33 mg/dL      Sodium 133 mmol/L      Potassium 3.4 mmol/L      Chloride 103 mmol/L      CO2 21.9 mmol/L       Calcium 8.8 mg/dL      Total Protein 6.0 g/dL      Albumin 3.1 g/dL      ALT (SGPT) 6 U/L      AST (SGOT) 8 U/L      Alkaline Phosphatase 69 U/L      Total Bilirubin 0.3 mg/dL      Globulin 2.9 gm/dL      A/G Ratio 1.1 g/dL      BUN/Creatinine Ratio --     Comment: Unable to calculate Bun/Crea Ratio.        Anion Gap 8.1 mmol/L      eGFR 143.2 mL/min/1.73     Narrative:      GFR Normal >60  Chronic Kidney Disease <60  Kidney Failure <15      Phosphorus [[id number]]  (Normal) Collected: 11/11/23 0623    Specimen: Blood Updated: 11/11/23 0824     Phosphorus 2.8 mg/dL     Comprehensive Metabolic Panel [[id number]]  (Abnormal) Collected: 11/10/23 1912    Specimen: Blood Updated: 11/10/23 2003     Glucose 133 mg/dL      BUN <2 mg/dL      Creatinine 0.39 mg/dL      Sodium 134 mmol/L      Potassium 3.0 mmol/L      Chloride 104 mmol/L      CO2 23.0 mmol/L      Calcium 8.8 mg/dL      Total Protein 5.7 g/dL      Albumin 3.1 g/dL      ALT (SGPT) 8 U/L      AST (SGOT) 7 U/L      Alkaline Phosphatase 66 U/L      Total Bilirubin 0.3 mg/dL      Globulin 2.6 gm/dL      A/G Ratio 1.2 g/dL      BUN/Creatinine Ratio --     Comment: Unable to calculate Bun/Crea Ratio.        Anion Gap 7.0 mmol/L      eGFR 137.6 mL/min/1.73     Narrative:      GFR Normal >60  Chronic Kidney Disease <60  Kidney Failure <15      Potassium [[id number]]  (Abnormal) Collected: 11/10/23 1912    Specimen: Blood Updated: 11/10/23 1949     Potassium 3.0 mmol/L     Triglycerides [[id number]]  (Normal) Collected: 11/10/23 1912    Specimen: Blood Updated: 11/10/23 1949     Triglycerides 79 mg/dL           Imaging Results (Last 72 Hours)       Procedure Component Value Units Date/Time    XR Chest 1 View [[id number]] Collected: 11/12/23 2006     Updated: 11/12/23 2010    Narrative:      SINGLE VIEW OF THE CHEST     HISTORY: PICC placement     COMPARISON: None available.     FINDINGS:  Left-sided PICC appears to terminate within the superior vena cava. No  pneumothorax,  pleural effusion, or acute infiltrate is seen.       Impression:      No acute findings.     This report was finalized on 11/12/2023 8:07 PM by Dr. Graciela Aviles M.D on Workstation: [id number]       US Ob Limited 1 + Fetuses [[id number]] Collected: 11/12/23 1351     Updated: 11/12/23 1351    Narrative:      US OB LIMITED 1 + FETUSES-TRANSABDOMINAL 11/12/2023     HISTORY: Approximate 11 weeks pregnancy with hyperemesis.     Single live intrauterine pregnancy is seen with gestational age of  approximately 11 weeks 4 days. Fetal cardiac activity is seen with heart  rate of approximately 164 bpm. There is an approximately 3.4 cm  partially calcified or other echogenic mass with posterior shadowing.     There is an approximately 2.9 cm slightly hypoechoic right adnexal  lesion, possibly a hemorrhagic corpus luteum cyst. No other adnexal  masses are seen bilaterally. No free fluid is seen.     Pression:  1. Single live intrauterine pregnancy with gestational age of  approximately 11 weeks 4 days. No gross fetal abnormalities are seen.  2. Echogenic or partially calcified uterine mass, possibly a fibroid.  3. A 2.9 cm hypoechoic right adnexal lesion may represent a slightly  hemorrhagic corpus luteum-type cyst.  4. No free fluid is seen.             ECG/EMG Results (last 72 hours)       ** No results found for the last 72 hours. **          Orders (last 72 hrs)        Start     Ordered    11/13/23 1200  Enoxaparin Sodium (LOVENOX) syringe 70 mg  Every 12 Hours         11/13/23 1038    11/13/23 1034  PICC Line Removal  Once         11/13/23 1033    11/13/23 1002  Inpatient Maternal & Fetal Medicine Consult  Once        Specialty:  Maternal and Fetal Medicine  Provider:  Cele Goldberg MD    11/13/23 1009    11/13/23 0906  Duplex Venous Upper Extremity - Left CAR  Once         11/13/23 0906    11/12/23 2209  Potassium  Timed         11/12/23 1208    11/12/23 1938  XR Chest 1 View  1 Time Imaging         11/12/23 1938     11/12/23 1800  Adult Central 2-in-1 TPN  Continuous TPN         11/12/23 1156    11/12/23 1800  Fat Emulsion Plant Based (Soy) 20 % infusion 20 g  Every 24 Hours Scheduled (TPN)         11/12/23 1156    11/12/23 1300  potassium chloride 10 mEq in 100 mL IVPB  Every 1 Hour         11/12/23 1208    11/12/23 0730  Potassium  Timed         11/12/23 0240    11/12/23 0600  Magnesium  Daily       11/11/23 1159    11/12/23 0600  Phosphorus  Daily       11/11/23 1159    11/12/23 0600  hCG, Quantitative, Pregnancy  Morning Draw         11/11/23 1801    11/12/23 0400  Potassium  Timed,   Status:  Canceled         11/11/23 1801    11/12/23 0000  US Ob Limited 1 + Fetuses  1 Time Imaging        Comments: Ultrasound may be performed portable    11/11/23 1627    11/11/23 2145  sodium chloride 0.9 % flush 10 mL  Every 12 Hours Scheduled         11/11/23 2053 11/11/23 2145  sodium chloride 0.9 % flush 10 mL  Every 12 Hours Scheduled         11/11/23 2053 11/11/23 2056  Catheter Care PICC  Per Order Details        Comments: 1) Follow PICC Protocol For Care  2) No Blood Pressure in Arm With PICC  3) Warm Packs to PICC Arm As Needed For Discomfort  4) Measure & Record Arm Circumference if Patient Experiences Pain, Swelling, Redness or Warmth in PICC Arm; Compare Measurement to Initial Measure, Report to Provider if Greater Than 3cm Difference  5) Follow Flush Protocol Per Facility    11/11/23 2055 11/11/23 2056  Ensure Needleless Connectors are In Place  Per Order Details        Comments: Change Needleless Connectors Per [facility] Policy    11/11/23 2055 11/11/23 2056  No Venipuncture or BP in PICC Arm  Continuous         11/11/23 2055 11/11/23 2056  For Sluggish / Occluded Line, Use CATHFLO Activase Per Policy (Per IV Nurse Only)  Once        Comments: Per Facility Policy    11/11/23 2055 11/11/23 2056  Use 3CG Technology For Placement Verification (PICC Nurse)  Once         11/11/23 2055 11/11/23 2054  PICC LINE  CARE - Change Transparent Dressing With CHG Disk & Securement Device Every 7 Days  Weekly        Comments: Per [facility] Policy    11/11/23 2053 11/11/23 2054  PICC LINE CARE - Connectors / Hubs Must Be Scrubbed 15 Seconds Using 70% Alcohol & Allowed to Dry Before Accessing Line  Continuous         11/11/23 2053 11/11/23 2054  PICC LINE CARE - Change Needleless Connectors  Per Order Details        Comments: Per [facility] Policy    11/11/23 2053 11/11/23 2053  sodium chloride 0.9 % flush 10 mL  As Needed         11/11/23 2053 11/11/23 2053  sodium chloride 0.9 % flush 20 mL  As Needed         11/11/23 2053 11/11/23 1900  potassium chloride 10 mEq in 100 mL IVPB  Every 1 Hour         11/11/23 1801    11/11/23 1800  Adult Peripheral or Midline 2-in-1 TPN  Continuous TPN,   Status:  Discontinued         11/11/23 1159    11/11/23 1800  Adult Peripheral or Midline 2-in-1 TPN  Continuous TPN         11/11/23 1214    11/11/23 1800  Inpatient IV Team Consult PICC 2 Lumens  Once        Provider:  (Not yet assigned)    11/11/23 1801    11/11/23 1800  RN To Release PICC Line Care Orders Once Line in Place  Once         11/11/23 1801    11/11/23 1628  Daily Weights  Daily       11/11/23 1627    11/11/23 1223  Potassium  Timed         11/11/23 0022    11/11/23 1030  acetaminophen (TYLENOL) tablet 650 mg  Once         11/11/23 0942    11/11/23 0600  pantoprazole (PROTONIX) injection 40 mg  Every Early Morning         11/10/23 1730    11/11/23 0600  Magnesium  Morning Draw         11/10/23 1740    11/11/23 0600  Phosphorus  Morning Draw         11/10/23 1740    11/11/23 0400  phenol (CHLORASEPTIC) 1.4 % liquid 1 spray  Every 2 Hours PRN         11/11/23 0401    11/11/23 0115  potassium chloride 10 mEq in 100 mL IVPB  Every 1 Hour         11/11/23 0022    11/10/23 1923  Potassium  Timed         11/10/23 0723    11/10/23 1830  dextrose 10 % infusion  Continuous         11/10/23 1736    11/10/23 1747  Comprehensive Metabolic  Panel  Daily       11/10/23 1740    11/10/23 1738  Triglycerides  Once         11/10/23 1740    11/10/23 1728  Inpatient Nutrition Consult  Once        Provider:  (Not yet assigned)    11/10/23 1728    11/10/23 1727  Pharmacy to Dose TPN  Continuous PRN         11/10/23 1728    11/10/23 1619  Monitor Fetal Heart Tones  Once        Comments: Doppler fetal heart tones before discharge or until order modified.    11/10/23 1619    11/10/23 1242  doxylamine-pyridoxine 25-25 mg combo dose  2 Times Daily PRN         11/10/23 1242    11/10/23 0815  potassium chloride 10 mEq in 100 mL IVPB  Every 1 Hour         11/10/23 0723    11/10/23 0717  Potassium Replacement - Follow Nurse / BPA Driven Protocol  As Needed         11/10/23 0718    11/10/23 0717  Magnesium Standard Dose Replacement - Follow Nurse / BPA Driven Protocol  As Needed         11/10/23 0718    11/10/23 0717  Phosphorus Replacement - Follow Nurse / BPA Driven Protocol  As Needed         11/10/23 0718    11/10/23 0717  Calcium Replacement - Follow Nurse / BPA Driven Protocol  As Needed         11/10/23 0718    11/10/23 0717  Potassium Replacement - Follow Nurse / BPA Driven Protocol  As Needed         11/10/23 0718    11/08/23 1300  promethazine (PHENERGAN) 12.5 mg in sodium chloride 0.9 % 50 mL  Every 4 Hours         11/08/23 1205    11/08/23 1300  diphenhydrAMINE (BENADRYL) injection 25 mg  Every 6 Hours         11/08/23 1210    11/07/23 1506  calcium carbonate (TUMS) chewable tablet 500 mg (200 mg elemental)  3 Times Daily PRN         11/07/23 1506    11/07/23 0915  famotidine (PEPCID) injection 20 mg  2 Times Daily         11/07/23 0823    11/05/23 1830  famotidine (PEPCID) injection 20 mg  Every 12 Hours PRN         11/05/23 1822    11/05/23 1600  Vital Signs q 4 while awake  Every 4 Hours      Comments: While the patient is awake.    11/05/23 1559    11/05/23 1400  dextrose 5 % and sodium chloride 0.45 % infusion  Continuous         11/05/23 1300     23 1400  ondansetron (ZOFRAN) injection 8 mg  Every 8 Hours         23 0958    23 1114  sodium chloride 0.9 % flush 10 mL  Every 12 Hours Scheduled         23 1059    23 1057  Intake & Output  Every Shift       23 1059    23 1056  sodium chloride 0.9 % infusion 40 mL  As Needed         23 1059    23 1056  sodium chloride 0.9 % flush 10 mL  As Needed         23 105    Unscheduled  PICC LINE CARE - Change Dressing As Needed When Damp, Loose or Soiled  As Needed       23    --  doxylamine (UNISOM) 25 MG tablet  Nightly PRN         23 1101    --  SCANNED - TELEMETRY           23 0000    --  SCANNED - TELEMETRY           23 0000    --  SCANNED - TELEMETRY           23 0000    --  SCANNED - TELEMETRY           23 0000    --  SCANNED - TELEMETRY           23 0000    --  SCANNED - TELEMETRY           23 0000                  Operative/Procedure Notes (last 72 hours)  Notes from 11/10/23 1052 through 23 1052   No notes of this type exist for this encounter.          Physician Progress Notes (last 72 hours)        Stephane Garza MD at 23 1009          [facility]  Osbaldo Mayo  : 1995  MRN: [id number]  CSN: [id number]    Hospital Day: 11        Antepartum Progress Note    Subjective       Osbaldo Mayo is a 28 y.o. female  at 11w2d.  Patient is receiving TPN through a PICC line in the left upper arm, in general she feels better and has been able to tolerate some p.o. intake.  However, she is developed a sensation of feeling the PICC line in her left upper shoulder and a pressure feeling in the chest.  She does not feel shortness of breath.  Chest x-ray yesterday was unremarkable with demonstration of the tip of the SVC and the appropriate place.      Review of Systems   Constitutional:  Negative for chills, fatigue and fever.   HENT: Negative.     Eyes:  Negative for  photophobia and visual disturbance.   Respiratory:  Negative for cough, chest tightness and shortness of breath.    Cardiovascular:  Positive for chest pain (Mild chest discomfort). Negative for leg swelling.   Gastrointestinal:  Positive for nausea (But decreased from admission). Negative for abdominal pain, diarrhea and vomiting.   Genitourinary:  Negative for dysuria, flank pain, hematuria, pelvic pain, vaginal bleeding and vaginal discharge.   Musculoskeletal:  Negative for back pain.        Left upper shoulder discomfort   Neurological:  Negative for dizziness, seizures, weakness and headaches.         Objective     Min/max vitals past 24 hours:   Temp  Min: 97.8 °F (36.6 °C)  Max: 98.6 °F (37 °C)  BP  Min: 98/59  Max: 121/75  Pulse  Min: 87  Max: 92  Resp  Min: 15  Max: 18               General: No acute distress   Heart: Not performed.   Lungs: breathing is unlabored  clear to auscultation bilaterally   Abdomen: soft, non-tender; no masses       Cervix: was not checked.   Contractions: none          Recent Results (from the past 24 hour(s))   Potassium    Collection Time: 11/12/23 10:31 PM    Specimen: Blood   Result Value Ref Range    Potassium 3.7 3.5 - 5.2 mmol/L   Comprehensive Metabolic Panel    Collection Time: 11/13/23  5:08 AM    Specimen: Blood   Result Value Ref Range    Glucose 112 (H) 65 - 99 mg/dL    BUN 7 6 - 20 mg/dL    Creatinine 0.38 (L) 0.57 - 1.00 mg/dL    Sodium 131 (L) 136 - 145 mmol/L    Potassium 4.3 3.5 - 5.2 mmol/L    Chloride 104 98 - 107 mmol/L    CO2 18.0 (L) 22.0 - 29.0 mmol/L    Calcium 8.7 8.6 - 10.5 mg/dL    Total Protein 5.8 (L) 6.0 - 8.5 g/dL    Albumin 3.1 (L) 3.5 - 5.2 g/dL    ALT (SGPT) 9 1 - 33 U/L    AST (SGOT) 11 1 - 32 U/L    Alkaline Phosphatase 68 39 - 117 U/L    Total Bilirubin <0.2 0.0 - 1.2 mg/dL    Globulin 2.7 gm/dL    A/G Ratio 1.1 g/dL    BUN/Creatinine Ratio 18.4 7.0 - 25.0    Anion Gap 9.0 5.0 - 15.0 mmol/L    eGFR 138.4 >60.0 mL/min/1.73   Magnesium     Collection Time: 23  5:08 AM    Specimen: Blood   Result Value Ref Range    Magnesium 1.9 1.6 - 2.6 mg/dL   Phosphorus    Collection Time: 23  5:08 AM    Specimen: Blood   Result Value Ref Range    Phosphorus 2.8 2.5 - 4.5 mg/dL         23  1800 23  0524 23  0517   Weight: 76.3 kg (168 lb 3.2 oz) 71.6 kg (157 lb 13.6 oz) 72.4 kg (159 lb 9.8 oz)           Assessment   IUP at 11w2d  Hyperemesis gravidarum currently on TPN through a PICC line in the left upper arm, now with swelling in the left upper arm and complaints of chest discomfort.  Doppler of the left upper arm has been ordered.  Following results of Doppler we will make management decision regarding removal versus continued use of the PICC line.  Hypokalemia is improved  Patient does have mild hyponatremia with sodium 131.  She has demonstrated a mild weight gain improving from 71.6 kg to 72.4 kg  In view of the fact the patient is now on TPN have requested MFM consult for advice and management    Plan   Doppler study of left upper arm has been ordered in view of patient's complaint of discomfort at the PICC line site and chest discomfort  We will await recommendations of MFM  We will continue to monitor electrolytes while patient is on TPN and until stable  We will plan for discharge once patient is able to be stable without loss of electrolytes and is tolerating p.o. foods.  Patient is not stable for discharge at this time    Stephane Garza MD  2023  10:10 EST  OB Hospitalist  Phone:  [phone]    Electronically signed by Stephane Garza MD at 23 1021       Venice Kang MD at 23 1006          [facility]  Osbaldo Mayo  : 1995  MRN: [id number]  CSN: [id number]    Hospital Day: 10    CC: hospital follow-up for hyperemesis gravidarum    Antepartum Progress Note    Subjective   The patient is a 28 year old  @ 10w1d admitted for hyperemesis.  She is now receiving TPN through a  PICC line and Protonix was added to her medication regimen.  Patient now states that she is feeling better than she has felt during the entire admission.  She has not vomited today and has tolerated very small amounts of oral intake so far.  She had her first bowel movement since admission this morning.  The stool was loose with some formed parts.    She denies cramping or bleeding.    Review of Systems  Negative except as per HPI    Objective     Min/max vitals past 24 hours:   Temp  Min: 97.8 °F (36.6 °C)  Max: 98.5 °F (36.9 °C)  BP  Min: 114/69  Max: 138/76  Pulse  Min: 75  Max: 101  Resp  Min: 16  Max: 18         General: well developed; well nourished  no acute distress   Heart: regular rate and rhythm, S1, S2 normal, no murmur, click, rub or gallop   Lungs: breathing is unlabored   Abdomen: soft, non-tender; no masses       Cervix: was not checked.                  Assessment   IUP at 11w1d  Hyperemesis gravidarum  Hypokalemia  GERD      Plan   Continue current management  Will request MFM consult tomorrow.        Venice Kang MD  2023  10:06 EST           Electronically signed by Venice Kang MD at 23 1102       Stephane Garza MD at 23 1616          [facility]  Osbaldo Mayo  : 1995  MRN: [id number]  CSN: [id number]    Hospital Day: 9        Antepartum Progress Note    Subjective       Osbaldo Mayo is a 28 y.o. female  at 11w0d patient reports still feeling weak and is having continued nausea and vomiting when she attempts to eat.  She is starting on peripheral TPN at 6 PM this evening      Review of Systems   Constitutional:  Negative for chills, fatigue and fever.   HENT: Negative.     Eyes:  Negative for photophobia and visual disturbance.   Respiratory:  Negative for cough, chest tightness and shortness of breath.    Cardiovascular:  Negative for chest pain and leg swelling.   Gastrointestinal:  Positive for nausea and vomiting. Negative for  abdominal pain and diarrhea.        Heartburn   Genitourinary:  Negative for dysuria, flank pain, hematuria, pelvic pain, vaginal bleeding and vaginal discharge.   Musculoskeletal:  Negative for back pain.   Neurological:  Negative for dizziness, seizures, weakness and headaches.         Objective     Min/max vitals past 24 hours:   Temp  Min: 97.3 °F (36.3 °C)  Max: 98.6 °F (37 °C)  BP  Min: 119/72  Max: 132/75  Pulse  Min: 73  Max: 86  Resp  Min: 16  Max: 18               General: no acute distress   Heart: Not performed.   Lungs: breathing is unlabored   Abdomen: soft, non-tender; no masses       Cervix: was not checked.              Recent Results (from the past 24 hour(s))   Potassium    Collection Time: 11/10/23  7:12 PM    Specimen: Blood   Result Value Ref Range    Potassium 3.0 (L) 3.5 - 5.2 mmol/L   Triglycerides    Collection Time: 11/10/23  7:12 PM    Specimen: Blood   Result Value Ref Range    Triglycerides 79 0 - 150 mg/dL   Comprehensive Metabolic Panel    Collection Time: 11/10/23  7:12 PM    Specimen: Blood   Result Value Ref Range    Glucose 133 (H) 65 - 99 mg/dL    BUN <2 (L) 6 - 20 mg/dL    Creatinine 0.39 (L) 0.57 - 1.00 mg/dL    Sodium 134 (L) 136 - 145 mmol/L    Potassium 3.0 (L) 3.5 - 5.2 mmol/L    Chloride 104 98 - 107 mmol/L    CO2 23.0 22.0 - 29.0 mmol/L    Calcium 8.8 8.6 - 10.5 mg/dL    Total Protein 5.7 (L) 6.0 - 8.5 g/dL    Albumin 3.1 (L) 3.5 - 5.2 g/dL    ALT (SGPT) 8 1 - 33 U/L    AST (SGOT) 7 1 - 32 U/L    Alkaline Phosphatase 66 39 - 117 U/L    Total Bilirubin 0.3 0.0 - 1.2 mg/dL    Globulin 2.6 gm/dL    A/G Ratio 1.2 g/dL    BUN/Creatinine Ratio      Anion Gap 7.0 5.0 - 15.0 mmol/L    eGFR 137.6 >60.0 mL/min/1.73   Magnesium    Collection Time: 11/11/23  6:23 AM    Specimen: Blood   Result Value Ref Range    Magnesium 1.7 1.6 - 2.6 mg/dL   Phosphorus    Collection Time: 11/11/23  6:23 AM    Specimen: Blood   Result Value Ref Range    Phosphorus 2.8 2.5 - 4.5 mg/dL   Comprehensive  Metabolic Panel    Collection Time: 11/11/23  6:23 AM    Specimen: Blood   Result Value Ref Range    Glucose 118 (H) 65 - 99 mg/dL    BUN <2 (L) 6 - 20 mg/dL    Creatinine 0.33 (L) 0.57 - 1.00 mg/dL    Sodium 133 (L) 136 - 145 mmol/L    Potassium 3.4 (L) 3.5 - 5.2 mmol/L    Chloride 103 98 - 107 mmol/L    CO2 21.9 (L) 22.0 - 29.0 mmol/L    Calcium 8.8 8.6 - 10.5 mg/dL    Total Protein 6.0 6.0 - 8.5 g/dL    Albumin 3.1 (L) 3.5 - 5.2 g/dL    ALT (SGPT) 6 1 - 33 U/L    AST (SGOT) 8 1 - 32 U/L    Alkaline Phosphatase 69 39 - 117 U/L    Total Bilirubin 0.3 0.0 - 1.2 mg/dL    Globulin 2.9 gm/dL    A/G Ratio 1.1 g/dL    BUN/Creatinine Ratio      Anion Gap 8.1 5.0 - 15.0 mmol/L    eGFR 143.2 >60.0 mL/min/1.73   Potassium    Collection Time: 11/11/23  1:27 PM    Specimen: Hand, Left; Blood   Result Value Ref Range    Potassium 3.3 (L) 3.5 - 5.2 mmol/L           Assessment   IUP at 11w0d  Hypokalemia improved with most recent potassium 3.3, she is to receive peripheral TPN which contains 40 mEq of potassium with repeat potassium level to be obtained in the morning  In view of need for TPN patient will require a PICC line which will be placed either later today or tomorrow  Will order ultrasound for tomorrow for fetal evaluation and for viability  Will begin daily weights as she has lost approximately 20 pounds since October 16  Will need maternal-fetal medicine consult, will obtain on Monday, 11/13/2023 as we have no in-house MFM on weekends    Plan   PICC line for TPN, pharmacy managing TPN  Follow serial labs as required to assess electrolytes  Ultrasound tomorrow for evaluation of fetus and viability  MFM consult planned for 11/13/2023  Daily weight    Stephane Garza MD  11/11/2023  16:17 EST  OB Hospitalist  Phone:  [phone]    Electronically signed by Stephane Garza MD at 11/11/23 1627       Rosaura Zavala MD at 11/10/23 1646          [facility]  [place]ael  : 1995  MRN: [id number]  CSN: [id number]    Hospital Day: 8    CC: hospital follow-up for hyperemesis.      Antepartum Progress Note    Subjective   Patient with dinner at bedside but she hasn't eaten any of it.  She reports she is tolerating liquids okay but the burning from the reflux is worsening and is the main cause of her ongoing emesis.  She states the Pepcid and Tums help but she hasn't received Tums since getting transferred.  I encouraged her to call nurse for this as it is ordered PRN.    Review of Systems   Constitutional:  Negative for chills, fatigue and fever.   HENT:  Negative for congestion, rhinorrhea and sore throat.    Eyes:  Negative for visual disturbance.   Respiratory: Negative.     Cardiovascular: Negative.    Gastrointestinal:  Positive for nausea and vomiting. Negative for abdominal pain, constipation and diarrhea.        Reflux   Genitourinary:  Negative for difficulty urinating, dyspareunia, dysuria, flank pain, frequency, genital sores, hematuria, pelvic pain, urgency, vaginal bleeding, vaginal discharge and vaginal pain.   Neurological:  Negative for dizziness, seizures, light-headedness and headaches.   Psychiatric/Behavioral:  Negative for sleep disturbance. The patient is not nervous/anxious.          Objective     Min/max vitals past 24 hours:   Temp  Min: 97.4 °F (36.3 °C)  Max: 98.9 °F (37.2 °C)  BP  Min: 99/64  Max: 155/73  Pulse  Min: 69  Max: 91  Resp  Min: 16  Max: 18         General: well developed; well nourished  no acute distress   Heart: Not performed.   Lungs: breathing is unlabored   Abdomen: soft, non-tender; no masses  no umbilical or inguinal hernias are present  no hepato-splenomegaly   FHT's: Normal doptones present   Cervix: was not checked.   Contractions: none              Assessment   IUP at 10w6d  Hyperemesis gravidarum with ongoing hypokalemia and weight loss- potassium not replaced yesterday and this morning down to 2.7.  Replacement  protocol ordered for today.  At this point, patient has not improved either clinically or with potassium levels.  She is having ongoing reflux that is not controlled entirely with Pepcid. Since October 16th, she has a documented weight loss of almost twenty pounds.       Plan   Potassium replacement per procotol with repeat BMP in AM.  Continue zofran and phenergan as needed for nausea.  TPN ordered as this was recommended by nutrition yesterday.  Plan to start in AM and pharmacy will dose.  Pharmacy also recommends starting D10W now at 50 mL/hour.    Diclegis and pantoprazole added to see if this helps her symptoms.  She has taken Diclegis before and it has helped.    At this point if patient doesn't improve in next day or two, would strongly consider glucocorticoids and MFM consult for further guidance.                Rosaura Zavala MD  11/10/2023  16:46 EST           Electronically signed by Rosaura Zavala MD at 11/10/23 1740       Consult Notes (last 72 hours)  Notes from 11/10/23 1052 through 11/13/23 1052   No notes of this type exist for this encounter.

## 2023-11-13 NOTE — PROGRESS NOTES
Continued Stay Note  [facility]     Patient Name: Osbaldo Mayo  MRN: [id number]  Today's Date: 11/13/2023    Admit Date: 11/3/2023    Plan: Home No needs   Discharge Plan       Row Name 11/13/23 1521       Plan    Plan Home No needs    Plan Comments CCP is continuing to follow for poss needs/equipment.                   Discharge Codes    No documentation.                 Expected Discharge Date and Time       Expected Discharge Date Expected Discharge Time    Nov 14, 2023               Bety Reyna RN

## 2023-11-13 NOTE — PLAN OF CARE
Goal Outcome Evaluation:  Plan of Care Reviewed With: patient        Progress: no change  Outcome Evaluation: Patient c/o left chest/upper back pain this AM. IV RN notified to assess. LUE doppler done. PICC line removed and Lovenox started. MFM consulted for management. Patient is taking in small amounts PO. Peripheral IV placed, scheduled Benadryl & Zofran given, but patient is refusing Phenergan. Up ad halle. VSS on room air. Still c/o left chest/upper back pain this afternoon, but states pain is improved since PICC line removal. CT angio chest ordered to r/o PE.

## 2023-11-13 NOTE — PROGRESS NOTES
[facility] Clinical Pharmacy Services: TPN Daily Progress Note    TPN Day # 3  Indication:  Severe hyperemesis gravidarum  Route: peripheral  Type: standard    Subjective/Objective  Results from last 7 days   Lab Units 23  0508 23  0623 11/10/23  1912   SODIUM mmol/L 131*   < > 134*   POTASSIUM mmol/L 4.3   < > 3.0*  3.0*   CHLORIDE mmol/L 104   < > 104   CO2 mmol/L 18.0*   < > 23.0   BUN mg/dL 7   < > <2*   CREATININE mg/dL 0.38*   < > 0.39*   CALCIUM mg/dL 8.7   < > 8.8   ALBUMIN g/dL 3.1*   < > 3.1*   BILIRUBIN mg/dL <0.2   < > 0.3   ALK PHOS U/L 68   < > 66   ALT (SGPT) U/L 9   < > 8   AST (SGOT) U/L 11   < > 7   GLUCOSE mg/dL 112*   < > 133*   MAGNESIUM mg/dL 1.9   < >  --    PHOSPHORUS mg/dL 2.8   < >  --    TRIGLYCERIDES mg/dL  --   --  79    < > = values in this interval not displayed.        Diet Orders (active) (From admission, onward)       Start     Ordered    23 1111  Diet: Regular/House Diet; Texture: Regular Texture (IDDSI 7); Fluid Consistency: Thin (IDDSI 0)  Diet Effective Now        Comments: As tolerated.    23 1110                  Additional insulin administration while previous TPN infusin units  Additional electrolyte administration while previous TPN infusing: none  Acid suppression: famotidine 20 mg IV q12h    Goal TPN Formula Recommendations: /100 AA/Dex/Lipids; 1880 kcal/day  Current TPN Formula: 0/0/0 AA/Dex/Lipids    Assessment/Plan      PICC removed after doppler completed showing DVT in LUE. Peripheral placed. Will stop TPN for now per Dr Garza pending further MFM recommendations. Will start D10W at 75 ml/hr.     Labs: CMP, Phos, Mg daily    Uma Lou, PharmD  Clinical Staff Pharmacist

## 2023-11-13 NOTE — PROGRESS NOTES
FYI   [facility]  Osbaldo Mayo  : 1995  MRN: [id number]  CSN: [id number]    Hospital Day: 11        Antepartum Progress Note    Subjective       Osbaldo Mayo is a 28 y.o. female  at 11w2d.  Patient is receiving TPN through a PICC line in the left upper arm, in general she feels better and has been able to tolerate some p.o. intake.  However, she is developed a sensation of feeling the PICC line in her left upper shoulder and a pressure feeling in the chest.  She does not feel shortness of breath.  Chest x-ray yesterday was unremarkable with demonstration of the tip of the SVC and the appropriate place.      Review of Systems   Constitutional:  Negative for chills, fatigue and fever.   HENT: Negative.     Eyes:  Negative for photophobia and visual disturbance.   Respiratory:  Negative for cough, chest tightness and shortness of breath.    Cardiovascular:  Positive for chest pain (Mild chest discomfort). Negative for leg swelling.   Gastrointestinal:  Positive for nausea (But decreased from admission). Negative for abdominal pain, diarrhea and vomiting.   Genitourinary:  Negative for dysuria, flank pain, hematuria, pelvic pain, vaginal bleeding and vaginal discharge.   Musculoskeletal:  Negative for back pain.        Left upper shoulder discomfort   Neurological:  Negative for dizziness, seizures, weakness and headaches.          Objective     Min/max vitals past 24 hours:   Temp  Min: 97.8 °F (36.6 °C)  Max: 98.6 °F (37 °C)  BP  Min: 98/59  Max: 121/75  Pulse  Min: 87  Max: 92  Resp  Min: 15  Max: 18               General: No acute distress   Heart: Not performed.   Lungs: breathing is unlabored  clear to auscultation bilaterally   Abdomen: soft, non-tender; no masses       Cervix: was not checked.   Contractions: none          Recent Results (from the past 24 hour(s))   Potassium    Collection Time: 23 10:31 PM    Specimen: Blood   Result Value Ref Range    Potassium 3.7 3.5 - 5.2 mmol/L    Comprehensive Metabolic Panel    Collection Time: 11/13/23  5:08 AM    Specimen: Blood   Result Value Ref Range    Glucose 112 (H) 65 - 99 mg/dL    BUN 7 6 - 20 mg/dL    Creatinine 0.38 (L) 0.57 - 1.00 mg/dL    Sodium 131 (L) 136 - 145 mmol/L    Potassium 4.3 3.5 - 5.2 mmol/L    Chloride 104 98 - 107 mmol/L    CO2 18.0 (L) 22.0 - 29.0 mmol/L    Calcium 8.7 8.6 - 10.5 mg/dL    Total Protein 5.8 (L) 6.0 - 8.5 g/dL    Albumin 3.1 (L) 3.5 - 5.2 g/dL    ALT (SGPT) 9 1 - 33 U/L    AST (SGOT) 11 1 - 32 U/L    Alkaline Phosphatase 68 39 - 117 U/L    Total Bilirubin <0.2 0.0 - 1.2 mg/dL    Globulin 2.7 gm/dL    A/G Ratio 1.1 g/dL    BUN/Creatinine Ratio 18.4 7.0 - 25.0    Anion Gap 9.0 5.0 - 15.0 mmol/L    eGFR 138.4 >60.0 mL/min/1.73   Magnesium    Collection Time: 11/13/23  5:08 AM    Specimen: Blood   Result Value Ref Range    Magnesium 1.9 1.6 - 2.6 mg/dL   Phosphorus    Collection Time: 11/13/23  5:08 AM    Specimen: Blood   Result Value Ref Range    Phosphorus 2.8 2.5 - 4.5 mg/dL         11/06/23  1800 11/12/23  0524 11/13/23  0517   Weight: 76.3 kg (168 lb 3.2 oz) 71.6 kg (157 lb 13.6 oz) 72.4 kg (159 lb 9.8 oz)            Assessment   IUP at 11w2d  Hyperemesis gravidarum currently on TPN through a PICC line in the left upper arm, now with swelling in the left upper arm and complaints of chest discomfort.  Doppler of the left upper arm has been ordered.  Following results of Doppler we will make management decision regarding removal versus continued use of the PICC line.  Hypokalemia is improved  Patient does have mild hyponatremia with sodium 131.  She has demonstrated a mild weight gain improving from 71.6 kg to 72.4 kg  In view of the fact the patient is now on TPN have requested MFM consult for advice and management     Plan   Doppler study of left upper arm has been ordered in view of patient's complaint of discomfort at the PICC line site and chest discomfort  We will await recommendations of MFM  We will  continue to monitor electrolytes while patient is on TPN and until stable  We will plan for discharge once patient is able to be stable without loss of electrolytes and is tolerating p.o. foods.  Patient is not stable for discharge at this time    Stephane Garza MD  11/13/2023  10:10 EST  OB Hospitalist  Phone:  [phone]

## 2023-11-13 NOTE — CONSULTS
MATERNAL FETAL MEDICINE Consult Note    Dear Dr Yusuf Myrick MD:    Thank you for your kind referral of Osbaldo Mayo.  As you know, she is a 28 y.o.   at  11 2/7 weeks gestation (Estimated Date of Delivery: 24). This is a consult.      Her antepartum course is complicated by:  Hyperemesis gravidarum      HPI: Today, she denies headache, blurry vision, RUQ pain. No vaginal bleeding, no contractions.     Review of History:  Past Medical History:   Diagnosis Date    Anemia     Heart murmur     as a child    Hypoglycemia     Uterine fibroids affecting pregnancy, antepartum      Past Surgical History:   Procedure Laterality Date    DILATATION AND CURETTAGE      WISDOM TOOTH EXTRACTION         Social History     Socioeconomic History    Marital status: Single   Tobacco Use    Smoking status: Former     Types: Cigars     Start date:      Quit date: 2023     Years since quittin.1    Smokeless tobacco: Never    Tobacco comments:     Smoke 2 cigars a day.   Vaping Use    Vaping Use: Never used   Substance and Sexual Activity    Alcohol use: Not Currently    Drug use: Not Currently    Sexual activity: Defer     Family History   Problem Relation Age of Onset    Hypertension Mother     Diabetes Mother     No Known Problems Father     Asthma Sister     Asthma Brother     No Known Problems Maternal Aunt     No Known Problems Maternal Uncle     No Known Problems Paternal Aunt     No Known Problems Paternal Uncle     No Known Problems Maternal Grandmother     Cancer Maternal Grandfather     No Known Problems Paternal Grandmother     No Known Problems Paternal Grandfather     Cancer Other         Breat    Diabetes Other     Hypertension Other     Anesthesia problems Neg Hx     Broken bones Neg Hx     Clotting disorder Neg Hx     Collagen disease Neg Hx     Dislocations Neg Hx     Osteoporosis Neg Hx     Rheumatologic disease Neg Hx     Scoliosis Neg Hx     Severe sprains Neg Hx       No Known Allergies    No current facility-administered medications on file prior to encounter.     Current Outpatient Medications on File Prior to Encounter   Medication Sig Dispense Refill    doxylamine (UNISOM) 25 MG tablet Take 1 tablet by mouth At Night As Needed for Sleep.      doxylamine-pyridoxine ER (Bonjesta) 20-20 MG tablet controlled-release tablet Take 1 tablet by mouth Every Night. 60 tablet 0    ondansetron ODT (ZOFRAN-ODT) 8 MG disintegrating tablet Place 1 tablet on the tongue Every 8 (Eight) Hours As Needed for Nausea or Vomiting. 30 tablet 0        Past obstetric, gynecological, medical, surgical, family and social history reviewed.  Relevant lab work and imaging reviewed.    Review of systems  Constitutional:  denies fever, chills, malaise.   ENT/Mouth:  denies sore throat, tinnitus  Eyes: denies vision changes/pain  CV:  denies chest pain  Respiratory:  denies cough/SOB  GI:  denies N/V, diarrhea, abdominal pain.    :   denies dysuria  Skin:  denies lesions or pruritus   Neuro:  denies weakness, focal neurologic symptoms    Vitals:    11/12/23 0904 11/12/23 1756 11/12/23 2116 11/13/23 0517   BP: 114/69 121/75 114/74 98/59   BP Location: Right leg Right leg Right leg Right arm   Patient Position: Lying Lying Sitting Sitting   Pulse: 101 92 90 87   Resp: 18 18 16 15   Temp: 98.5 °F (36.9 °C) 98.6 °F (37 °C) 98.2 °F (36.8 °C) 97.8 °F (36.6 °C)   TempSrc: Oral Oral Oral Oral   SpO2: 100% 100% 100% 94%   Weight:    72.4 kg (159 lb 9.8 oz)   Height:           PHYSICAL EXAM   GENERAL: Not in acute distress, AAOx3, pleasant  CARDIO: regular rate and rhythm  PULM: symmetric chest rise, speaking in complete sentences without difficulty  NEURO: awake, alert and oriented to person, place, and time  ABDOMINAL: No fundal tenderness, no rebound or guarding, gravid  EXTREMITIES: no bilateral lower extremity edema/tenderness  SKIN: Warm, well-perfused      ULTRASOUND   Please view full ultrasound note on Imaging tab in  [employer].      ASSESSMENT/COUNSELIN y.o.   at  11 2/7 weeks gestation (Estimated Date of Delivery: 24).     -Pregnancy  [ X ] stable  [   ] improving [  ] worsening    Diagnoses and all orders for this visit:    1. Hyperemesis gravidarum (Primary)    Other orders  -     CBC & Differential; Standing  -     Comprehensive Metabolic Panel; Standing  -     Lipase; Standing  -     Urinalysis With Microscopic If Indicated (No Culture) - Urine, Clean Catch; Standing  -     sodium chloride 0.9 % bolus 1,000 mL  -     ondansetron (ZOFRAN) injection 4 mg  -     CBC & Differential  -     Comprehensive Metabolic Panel  -     Lipase  -     Urinalysis With Microscopic If Indicated (No Culture) - Urine, Clean Catch  -     Urinalysis, Microscopic Only - Urine, Clean Catch; Standing  -     Urinalysis, Microscopic Only - Urine, Clean Catch  -     ondansetron (ZOFRAN) injection 4 mg  -     famotidine (PEPCID) injection 20 mg  -     Initiate ED Observation Status; Standing  -     Initiate ED Observation Status  -     Cancel: Code Status and Medical Interventions:; Standing  -     Cancel: Vital Signs; Standing  -     Cancel: Advance Diet As Tolerated -; Standing  -     Intake & Output; Standing  -     Cancel: Weigh Patient; Standing  -     Cancel: Oral Care; Standing  -     Inpatient Obstetrics / Gynecology Consult; Standing  -     Cancel: Urinalysis With Microscopic If Indicated (No Culture) - Urine, Clean Catch; Standing  -     Insert Peripheral IV; Standing  -     Saline Lock & Maintain IV Access; Standing  -     sodium chloride 0.9 % flush 10 mL  -     sodium chloride 0.9 % flush 10 mL  -     sodium chloride 0.9 % infusion 40 mL  -     Discontinue: ondansetron (ZOFRAN) tablet 4 mg  -     Discontinue: ondansetron (ZOFRAN) injection 4 mg  -     Cancel: Place Sequential Compression Device; Standing  -     Cancel: Maintain Sequential Compression Device; Standing  -     Activity - Ad Che; Standing  -     Cancel: Diet:  Liquid Diets; Clear Liquid; Fluid Consistency: Thin (IDDSI 0); Standing  -     Basic Metabolic Panel; Standing  -     CBC Auto Differential; Standing  -     hCG, Quantitative, Pregnancy; Standing  -     Fetal heart tones x 1  Misc Nursing Order (Specify); Standing  -     Bowel Regimen Not Indicated; Standing  -     Cancel: Code Status and Medical Interventions:  -     Vital Signs  -     Vital Signs  -     Vital Signs  -     Vital Signs  -     Cancel: Advance Diet As Tolerated -  -     Intake & Output  -     Intake & Output  -     Cancel: Weigh Patient  -     Oral Care  -     Inpatient Obstetrics / Gynecology Consult  -     Insert Peripheral IV  -     Saline Lock & Maintain IV Access  -     Cancel: Place Sequential Compression Device  -     Cancel: Maintain Sequential Compression Device  -     Activity - Ad Che  -     Cancel: Diet: Liquid Diets; Clear Liquid; Fluid Consistency: Thin (IDDSI 0)  -     hCG, Quantitative, Pregnancy  -     Fetal heart tones x 1  Misc Nursing Order (Specify)  -     Bowel Regimen Not Indicated  -     Discontinue: dextrose 5 % and sodium chloride 0.45 % infusion  -     Discontinue: doxylamine-pyridoxine 25-25 mg combo dose  -     Inpatient Case Management  Consult; Standing  -     Inpatient Case Management  Consult  -     Cancel: URINE DRUG SCREEN PLUS BUPRENORPHINE -; Standing  -     Cancel: URINE DRUG SCREEN PLUS BUPRENORPHINE -  -     URINE DRUG SCREEN PLUS BUPRENORPHINE -; Standing  -     URINE DRUG SCREEN PLUS BUPRENORPHINE - Urine, Clean Catch  -     Cancel: Urinalysis With Microscopic If Indicated (No Culture) - Urine, Clean Catch  -     Basic Metabolic Panel  -     CBC Auto Differential  -     Cancel: Vital Signs  -     Cancel: Vital Signs  -     Cancel: Vital Signs  -     Cancel: Vital Signs  -     Cancel: Vital Signs  -     Cancel: Vital Signs  -     Intake & Output  -     Intake & Output  -     Cancel: Oral Care  -     Cancel: Oral Care  -      Discontinue: famotidine (PEPCID) tablet 20 mg  -     Cancel: Magnesium; Standing  -     Discontinue: Potassium Replacement - Follow Nurse / BPA Driven Protocol  -     Magnesium; Standing  -     Magnesium  -     Discontinue: doxylamine-pyridoxine 25-25 mg combo dose  -     Cancel: Diet: Regular/House Diet, Gastrointestinal Diets; Fiber-Restricted, Low Irritant; Texture: Regular Texture (IDDSI 7); Fluid Consistency: Thin (IDDSI 0); Standing  -     Cancel: Diet: Regular/House Diet, Gastrointestinal Diets; Fiber-Restricted, Low Irritant; Texture: Regular Texture (IDDSI 7); Fluid Consistency: Thin (IDDSI 0)  -     Discontinue: promethazine (PHENERGAN) suppository 12.5 mg  -     Discontinue: doxylamine-pyridoxine 25-25 mg combo dose  -     Discontinue: potassium chloride (K-DUR,KLOR-CON) ER tablet 40 mEq  -     Potassium; Standing  -     famotidine (PEPCID) injection 20 mg  -     Cancel: NPO Diet NPO Type: Sips with Meds, Ice Chips; Standing  -     Cancel: NPO Diet NPO Type: Sips with Meds, Ice Chips  -     Discontinue: promethazine (PHENERGAN) suppository 12.5 mg  -     Discontinue: ondansetron ODT (ZOFRAN-ODT) disintegrating tablet 4 mg  -     Cancel: Cardiac Output Monitoring; Standing  -     Cancel: Cardiac Output Monitoring  -     Discontinue: dextrose 5 % and sodium chloride 0.45 % infusion  -     potassium chloride 10 mEq in 100 mL IVPB  -     Discontinue: promethazine (PHENERGAN) suppository 25 mg  -     Discontinue: ondansetron (ZOFRAN) injection 8 mg  -     Cancel: Cardiac monitoring; Standing  -     Cancel: Cardiac monitoring  -     Basic Metabolic Panel; Standing  -     Discontinue: famotidine (PEPCID) injection 20 mg  -     ondansetron (ZOFRAN) injection 8 mg  -     Discontinue: dextrose 5 % and sodium chloride 0.45 % with KCl 20 mEq/L infusion  -     Potassium  -     SCANNED - TELEMETRY    -     SCANNED - TELEMETRY    -     Basic Metabolic Panel  -     potassium chloride 10 mEq in 100 mL IVPB  -      Discontinue: Potassium Replacement - Follow Nurse / BPA Driven Protocol  -     potassium chloride 10 mEq in 100 mL IVPB  -     Cancel: Vital Signs  -     Cancel: Vital Signs  -     Cancel: Vital Signs  -     Cancel: Vital Signs  -     Cancel: Vital Signs  -     Cancel: Vital Signs  -     Intake & Output  -     Intake & Output  -     Cancel: Oral Care  -     Cancel: Oral Care  -     Discontinue: famotidine (PEPCID) injection 20 mg  -     potassium chloride 10 mEq in 100 mL IVPB  -     SCANNED - TELEMETRY    -     SCANNED - TELEMETRY    -     SCANNED - TELEMETRY    -     Cancel: Diet: Liquid Diets; Clear Liquid; Fluid Consistency: Thin (IDDSI 0); Standing  -     Cancel: Diet: Liquid Diets; Clear Liquid; Fluid Consistency: Thin (IDDSI 0)  -     Cancel: NPO Diet NPO Type: Strict NPO; Standing  -     Cancel: NPO Diet NPO Type: Strict NPO  -     Initiate Observation Status; Standing  -     Cancel: Transfer Patient; Standing  -     Initiate Observation Status  -     Cancel: Transfer Patient  -     dextrose 5 % and sodium chloride 0.45 % infusion  -     Transfer Patient; Standing  -     Cancel: Discontinue Cardiac Monitoring; Standing  -     Transfer Patient  -     Cancel: Discontinue Cardiac Monitoring  -     Initiate Observation Status; Standing  -     Initiate Observation Status  -     POC Glucose Once; Standing  -     POC Glucose Once  -     Admit To Obstetrics Inpatient; Standing  -     Code Status and Medical Interventions:; Standing  -     Place Sequential Compression Device; Standing  -     Maintain Sequential Compression Device; Standing  -     Vital Signs q 4 while awake; Standing  -     Cancel: Antepartum Patients  <24 Weeks - Document Fetal Heart Tones Daily and PRN.; Standing  -     Notify Provider (Specified); Standing  -     Notify Provider of Tachysystole (Per Hospital Algorithm); Standing  -     Notify Provider if Membranes Ruptured, Bleeding Greater Than 1 Pad Per Hour, Fetal Heart Tone Abnormality or  Severe Pain; Standing  -     CBC (No Diff); Standing  -     Type & Screen; Standing  -     Magnesium; Standing  -     Admit To Obstetrics Inpatient  -     Place Sequential Compression Device  -     Maintain Sequential Compression Device  -     Magnesium  -     Code Status and Medical Interventions:  -     Vital Signs q 4 while awake  -     Vital Signs q 4 while awake  -     Vital Signs q 4 while awake  -     Cancel: Antepartum Patients  <24 Weeks - Document Fetal Heart Tones Daily and PRN.  -     Notify Provider (Specified)  -     Notify Provider of Tachysystole (Per Hospital Algorithm)  -     Notify Provider if Membranes Ruptured, Bleeding Greater Than 1 Pad Per Hour, Fetal Heart Tone Abnormality or Severe Pain  -     CBC (No Diff)  -     Type & Screen  -     Comprehensive Metabolic Panel; Standing  -     Comprehensive Metabolic Panel  -     Discontinue: Potassium Replacement - Follow Nurse / BPA Driven Protocol  -     potassium chloride 10 mEq in 100 mL IVPB  -     famotidine (PEPCID) injection 20 mg  -     Comprehensive Metabolic Panel; Standing  -     Magnesium; Standing  -     Comprehensive Metabolic Panel  -     Magnesium  -     SCANNED - TELEMETRY    -     Intake & Output  -     Intake & Output  -     Vital Signs q 4 while awake  -     Vital Signs q 4 while awake  -     Vital Signs q 4 while awake  -     Vital Signs q 4 while awake  -     Vital Signs q 4 while awake  -     Vital Signs q 4 while awake  -     Discontinue: Potassium Replacement - Follow Nurse / BPA Driven Protocol  -     potassium chloride 10 mEq in 100 mL IVPB  -     Potassium; Standing  -     Potassium  -     Comprehensive Metabolic Panel; Standing  -     Magnesium; Standing  -     potassium chloride 10 mEq in 100 mL IVPB  -     Comprehensive Metabolic Panel  -     Magnesium  -     Potassium; Standing  -     Potassium  -     Intake & Output  -     Intake & Output  -     Vital Signs q 4 while awake  -     Vital Signs q 4 while awake  -      Vital Signs q 4 while awake  -     Vital Signs q 4 while awake  -     Vital Signs q 4 while awake  -     Vital Signs q 4 while awake  -     Discontinue: Potassium Replacement - Follow Nurse / BPA Driven Protocol  -     Discontinue: potassium chloride 20 mEq in sodium chloride 0.9 % 1,000 mL infusion  -     famotidine (PEPCID) injection 20 mg  -     sodium chloride 0.9 % with KCl 20 mEq/L infusion  -     Cancel: Advance Diet As Tolerated -; Standing  -     Cancel: Advance Diet As Tolerated -  -     calcium carbonate (TUMS) chewable tablet 500 mg (200 mg elemental)  -     Comprehensive Metabolic Panel; Standing  -     Cancel: Type & Screen; Standing  -     Cancel: Type & Screen  -     Comprehensive Metabolic Panel  -     Intake & Output  -     Intake & Output  -     Vital Signs q 4 while awake  -     Vital Signs q 4 while awake  -     Vital Signs q 4 while awake  -     Vital Signs q 4 while awake  -     Vital Signs q 4 while awake  -     Vital Signs q 4 while awake  -     Cancel: Diet: Liquid Diets; Clear Liquid; Fluid Consistency: Thin (IDDSI 0); Standing  -     Cancel: Diet: Liquid Diets; Clear Liquid; Fluid Consistency: Thin (IDDSI 0)  -     Cancel: NPO Diet NPO Type: Sips with Meds; Standing  -     Cancel: NPO Diet NPO Type: Sips with Meds  -     Discontinue: promethazine (PHENERGAN) tablet 25 mg  -     Discontinue: promethazine (PHENERGAN) suppository 25 mg  -     potassium chloride 10 mEq in 100 mL IVPB  -     promethazine (PHENERGAN) 12.5 mg in sodium chloride 0.9 % 50 mL  -     diphenhydrAMINE (BENADRYL) injection 25 mg  -     Basic Metabolic Panel; Standing  -     Cancel: [facility]; Standing  -     Cancel: [facility]  -     Type & Screen; Standing  -     Cancel: Comprehensive Metabolic Panel; Standing  -     Basic Metabolic Panel  -     Type & Screen  -     Intake & Output  -     Intake & Output  -     Vital Signs q 4 while awake  -     Vital Signs q 4 while  awake  -     Vital Signs q 4 while awake  -     Vital Signs q 4 while awake  -     Vital Signs q 4 while awake  -     Vital Signs q 4 while awake  -     Diet: Regular/House Diet; Texture: Regular Texture (IDDSI 7); Fluid Consistency: Thin (IDDSI 0); Standing  -     Diet: Regular/House Diet; Texture: Regular Texture (IDDSI 7); Fluid Consistency: Thin (IDDSI 0)  -     Comprehensive Metabolic Panel; Standing  -     Transfer Patient; Standing  -     Transfer Patient  -     Comprehensive Metabolic Panel  -     Intake & Output  -     Intake & Output  -     Vital Signs q 4 while awake  -     Vital Signs q 4 while awake  -     Vital Signs q 4 while awake  -     Vital Signs q 4 while awake  -     Vital Signs q 4 while awake  -     Vital Signs q 4 while awake  -     Cancel: Basic Metabolic Panel; Standing  -     Potassium Replacement - Follow Nurse / BPA Driven Protocol  -     Potassium Replacement - Follow Nurse / BPA Driven Protocol  -     Magnesium Standard Dose Replacement - Follow Nurse / BPA Driven Protocol  -     Phosphorus Replacement - Follow Nurse / BPA Driven Protocol  -     Calcium Replacement - Follow Nurse / BPA Driven Protocol  -     Magnesium; Standing  -     Basic Metabolic Panel  -     Magnesium  -     potassium chloride 10 mEq in 100 mL IVPB  -     Potassium; Standing  -     Potassium  -     doxylamine-pyridoxine 25-25 mg combo dose  -     Monitor Fetal Heart Tones; Standing  -     Monitor Fetal Heart Tones  -     Pharmacy to Dose TPN  -     Inpatient Nutrition Consult; Standing  -     Inpatient Nutrition Consult  -     pantoprazole (PROTONIX) injection 40 mg  -     dextrose 10 % infusion  -     Magnesium; Standing  -     Phosphorus; Standing  -     Triglycerides; Standing  -     Comprehensive Metabolic Panel; Standing  -     Triglycerides  -     Comprehensive Metabolic Panel  -     Magnesium  -     Phosphorus  -     Comprehensive Metabolic Panel  -     potassium chloride 10 mEq in 100 mL IVPB  -      Potassium; Standing  -     Intake & Output  -     Intake & Output  -     Vital Signs q 4 while awake  -     Vital Signs q 4 while awake  -     Vital Signs q 4 while awake  -     Vital Signs q 4 while awake  -     Vital Signs q 4 while awake  -     Vital Signs q 4 while awake  -     phenol (CHLORASEPTIC) 1.4 % liquid 1 spray  -     Potassium  -     acetaminophen (TYLENOL) tablet 650 mg  -     Discontinue: Adult Peripheral or Midline 2-in-1 TPN  -     Magnesium; Standing  -     Phosphorus; Standing  -     Adult Peripheral or Midline 2-in-1 TPN  -     Daily Weights; Standing  -     US Ob Limited 1 + Fetuses; Standing  -     Daily Weights  -     US Ob Limited 1 + Fetuses  -     potassium chloride 10 mEq in 100 mL IVPB  -     Cancel: Potassium; Standing  -     Inpatient IV Team Consult PICC 2 Lumens; Standing  -     sodium chloride 0.9 % flush 10 mL  -     sodium chloride 0.9 % flush 10 mL  -     sodium chloride 0.9 % flush 10 mL  -     sodium chloride 0.9 % flush 20 mL  -     PICC LINE CARE - Change Transparent Dressing With CHG Disk & Securement Device Every 7 Days; Standing  -     PICC LINE CARE - Change Dressing As Needed When Damp, Loose or Soiled; Standing  -     PICC LINE CARE - Connectors / Hubs Must Be Scrubbed 15 Seconds Using 70% Alcohol & Allowed to Dry Before Accessing Line; Standing  -     PICC LINE CARE - Change Needleless Connectors; Standing  -     RN To Release PICC Line Care Orders Once Line in Place; Standing  -     hCG, Quantitative, Pregnancy; Standing  -     Inpatient IV Team Consult PICC 2 Lumens  -     RN To Release PICC Line Care Orders Once Line in Place  -     Cancel: Potassium  -     PICC LINE CARE - Change Transparent Dressing With CHG Disk & Securement Device Every 7 Days  -     PICC LINE CARE - Connectors / Hubs Must Be Scrubbed 15 Seconds Using 70% Alcohol & Allowed to Dry Before Accessing Line  -     PICC LINE CARE - Change Needleless Connectors  -     Catheter Care PICC; Standing  -      Ensure Needleless Connectors are In Place; Standing  -     No Venipuncture or BP in PICC Arm; Standing  -     For Sluggish / Occluded Line, Use CATHFLO Activase Per Policy (Per IV Nurse Only); Standing  -     Use 3CG Technology For Placement Verification (PICC Nurse); Standing  -     Catheter Care PICC  -     Ensure Needleless Connectors are In Place  -     No Venipuncture or BP in PICC Arm  -     For Sluggish / Occluded Line, Use CATHFLO Activase Per Policy (Per IV Nurse Only)  -     Use 3CG Technology For Placement Verification (PICC Nurse)  -     Comprehensive Metabolic Panel  -     Magnesium  -     Phosphorus  -     hCG, Quantitative, Pregnancy  -     Intake & Output  -     Intake & Output  -     Vital Signs q 4 while awake  -     Vital Signs q 4 while awake  -     Vital Signs q 4 while awake  -     Vital Signs q 4 while awake  -     Vital Signs q 4 while awake  -     Vital Signs q 4 while awake  -     Daily Weights  -     Potassium; Standing  -     Potassium  -     Adult Central 2-in-1 TPN  -     Fat Emulsion Plant Based (Soy) 20 % infusion 20 g  -     potassium chloride 10 mEq in 100 mL IVPB  -     Potassium; Standing  -     Potassium  -     XR Chest 1 View; Standing  -     XR Chest 1 View  -     Comprehensive Metabolic Panel  -     Magnesium  -     Phosphorus  -     Intake & Output  -     Intake & Output  -     Vital Signs q 4 while awake  -     Vital Signs q 4 while awake  -     Vital Signs q 4 while awake  -     Vital Signs q 4 while awake  -     Vital Signs q 4 while awake  -     Vital Signs q 4 while awake  -     Daily Weights  -     Duplex Venous Upper Extremity - Left CAR; Standing  -     Duplex Venous Upper Extremity - Left CAR  -     Inpatient Maternal & Fetal Medicine Consult; Standing  -     Inpatient Maternal & Fetal Medicine Consult       Hyperemesis gravidarum/malnourishment, refractory:  S/p TPN, now has LUE DVT and SVT  This patient has significant hyperemesis gravidarum.  She is currently  taking famotidine and zofran prn but has been intermittently eating meals. She is concerned now that the TPN has stopped she will revert back to significant N/V.   Patients who are refractory to all pharmacologic and nonpharmacologic interventions should be supported with tube feeding and IV fluids with thiamine.      TPN can be considered but has considerable risks and is therefore a last resort in patients taking in no fluid or solids for prolonged periods that have exhausted all other options.  In my opinion, TPN should be avoided at all costs if at all possible due to significant risk of complications in pregnancy, especially if a trial of enteral tube feeding has not been tried--I would not restart TPN.  Possible complications associated with TPN include Dehydration and electrolyte Imbalances, thrombosis, hyperglycemia, hypoglycemia, infection, liver failure, micronutrient deficiencies, and later refeeding issues.      In general, tube feeding is begun in patients who cannot maintain their weight because of vomiting and despite a trial of the interventions described above. Enteral nutrition is preferable to the parenteral route and may relieve nausea and vomiting If tube feeding necessary, would continue until patient able to take in at least 1000 kcal/day PO.      Since she has been eating, I would hold off on enteral feeds for now       I reviewed the patient's medication list and have the following recommendations:  -Thorazine 50 mg IV BID.  Could transition to oral once eating appropriately.  I would continue this for at least 1-2 weeks until resolution of symptoms. We briefly discussed jitteriness/ sxs that can be associated with thorazine, so I would like to get her off before delivery, but if not, these are usually transient and self-limited and I think the benefit of her getting nutrition outweighs transient withdrawal if this ends of being the medication that helps her.  We will make this prn  for patient.       -Scopolamine 1 mg patch q 72 hours--she can stay on this until delivery if she needs to.       -Banana bag daily     -Continue reglan, protonix, and aggressive electrolyte replacement    If these fail, consider steroids.  Ideally would do after 12 weeks, but benefits would outweigh risks of inconsistent association with cleft and heart defects if other antiemetics fail.    -Hydrocortisone 100 mg BID x2 doses--Then transition to daily prednisone 40 mg daily.  Once eating x 24-48 hours, would taper to lowest effective dose.     Taper: 40 mg daily x 3 days past tolerating PO,  then 30 mg/day x 3 days, then 20 mg/day x 3 days, then 10 mg daily x 3 days, then 5 mg daily x 3 days.  Can then come off if continues to tolerate ween.  If at any point pt doesn't tolerate taper, go back to lowest effective dose.       For delivery,  I would recommend stress dose steroids if she remains >5 mg a day OR is on them for more than 3 weeks.     Potential regimen if stress dose indicated: Hydrocortisone 100 mg IV bolus then 50 mg q8h until delivered then back on normal dose.      -Consider boost shakes/nutrition supplementation if/when patient can tolerate.      -Would recommend an EKG given that she is on several qt prolonging medications.      -Recommend CTPA as pt is currently having chest pain--could be musculoskeletal related to vomiting, so ordered flexeril, but given clot needs to rule out PE.      Acute DVT and superficial venous thrombus:  For SVT, recommend warm compresses and tylenol prn.    Recommend Lovenox 1 mg/kg BID--this will need to be continued until 6-8 weeks postpartum.  Recommend nonurgent hematology consult to manage postpartum.        Summary of Plan  -Follow up MFM office in 3-4 weeks.   -Delivery 39 weeks unless indicated sooner   -See above for nutrition/medication recommendation details: in summary, start hydrocortisone, scopolamine, and thorazine and continue current antiemetics.    -EKG  now  -Will follow along peripherally while inpatient and see every couple of days.      Thank you for the consult and opportunity to care for this patient.  Please feel free to reach out with any questions or concerns.      I spent 40 minutes caring for this patient on this date of service. This time includes time spent by me in the following activities: preparing for the visit, reviewing tests, obtaining and/or reviewing a separately obtained history, performing a medically appropriate examination and/or evaluation, counseling and educating the patient/family/caregiver and independently interpreting results and communicating that information with the patient/family/caregiver with greater than 50% spent in counseling and coordination of care.     Cele Goldberg MD FACOG  Maternal Fetal Medicine-[facility]  Office: [phone]  [email or web address]

## 2023-11-13 NOTE — PROGRESS NOTES
[facility] [place]    Patient Name: Osbaldo Mayo  YOB: 1995  MRN: [id number]  Admission Date: 11/3/2023  7:31 AM  Date of Service: 11/13/2023      S:  Patient informed  of results of Left upper extremity doppler US, demonstrating axillary DVT.  Acute left upper extremity superficial thrombophlebitis noted in the basilic (upper arm) and cephalic (forearm).       O:    Patient Active Problem List    Diagnosis     *Hyperemesis gravidarum [O21.0]     Pregnancy [Z34.90]            Vitals:    11/12/23 1756 11/12/23 2116 11/13/23 0517 11/13/23 1100   BP: 121/75 114/74 98/59 110/71   BP Location: Right leg Right leg Right arm Right arm   Patient Position: Lying Sitting Sitting Lying   Pulse: 92 90 87 99   Resp: 18 16 15 18   Temp: 98.6 °F (37 °C) 98.2 °F (36.8 °C) 97.8 °F (36.6 °C) 98.1 °F (36.7 °C)   TempSrc: Oral Oral Oral Oral   SpO2: 100% 100% 94% 100%   Weight:   72.4 kg (159 lb 9.8 oz)    Height:             Scheduled Medications:    diphenhydrAMINE, 25 mg, Intravenous, Q6H  enoxaparin, 1 mg/kg, Subcutaneous, Q12H  famotidine, 20 mg, Intravenous, BID  Fat Emulsion Plant Based (Soy), 100 mL, Intravenous, Q24H (TPN)  ondansetron, 8 mg, Intravenous, Q8H  pantoprazole, 40 mg, Intravenous, Q AM  promethazine, 12.5 mg, Intravenous, Q4H  sodium chloride, 10 mL, Intravenous, Q12H  sodium chloride, 10 mL, Intravenous, Q12H  sodium chloride, 10 mL, Intravenous, Q12H            Assessment:  Dr Cele Goldberg MD [facility] was called and consulted regarding findings of DVT and superficial thrombosis of left upper extremity PICC line.  She recommended removal of PICC line and to begin therapeutic Lovenox at 1 mg/KG q 12 hours. Dr Goldberg will see the patient and make further recommendations.        Plan/Recommendations:    Remove PICC line  Begin Lovenox 1 mg per KG q 12 hours.  Await [facility] recommendations      Stephane Garza MD  11/13/2023  10:55 EST  OB Hospitalist  Phone   [phone]

## 2023-11-13 NOTE — NURSING NOTE
Iv team called to evaluate AZAEL picc placed on 11/11/23 due to pt has had some complaints. Assessed pt and AZAEL is slightly larger than MYA measured arm circumference at 26.5.which is an increase from 25.5 baseline. Recommend a doppler to evaluate for a clot. Pt's complaints consist of feeling line in upper shoulder and pressure in chest also being felt with arm movement. Xray was performed and did confirm tip is SVC and in appropriate place, pt reported no issues when line placed that would signify any nerve issues. Spoke with Janet KESSLER - she will notify MD for doppler order and iv team will continue to follow for results. If clot detected recommend removal of line due to pt's complaints to increase blood flow and prevent further swelling. Pt could be re evaluated for another picc in MYA , or PPN could be administered through a midline to avoid placing a PICC if deemed appropriate per MD for nutrition.

## 2023-11-14 LAB
ALBUMIN SERPL-MCNC: 2.9 G/DL (ref 3.5–5.2)
ALBUMIN/GLOB SERPL: 0.9 G/DL
ALP SERPL-CCNC: 72 U/L (ref 39–117)
ALT SERPL W P-5'-P-CCNC: 6 U/L (ref 1–33)
ANION GAP SERPL CALCULATED.3IONS-SCNC: 7.7 MMOL/L (ref 5–15)
AST SERPL-CCNC: 8 U/L (ref 1–32)
BILIRUB SERPL-MCNC: <0.2 MG/DL (ref 0–1.2)
BUN SERPL-MCNC: 4 MG/DL (ref 6–20)
BUN/CREAT SERPL: 10.5 (ref 7–25)
CALCIUM SPEC-SCNC: 8.7 MG/DL (ref 8.6–10.5)
CHLORIDE SERPL-SCNC: 104 MMOL/L (ref 98–107)
CO2 SERPL-SCNC: 21.3 MMOL/L (ref 22–29)
CREAT SERPL-MCNC: 0.38 MG/DL (ref 0.57–1)
EGFRCR SERPLBLD CKD-EPI 2021: 138.4 ML/MIN/1.73
GLOBULIN UR ELPH-MCNC: 3.1 GM/DL
GLUCOSE SERPL-MCNC: 93 MG/DL (ref 65–99)
MAGNESIUM SERPL-MCNC: 1.7 MG/DL (ref 1.6–2.6)
PHOSPHATE SERPL-MCNC: 2.9 MG/DL (ref 2.5–4.5)
POTASSIUM SERPL-SCNC: 3.6 MMOL/L (ref 3.5–5.2)
POTASSIUM SERPL-SCNC: 3.6 MMOL/L (ref 3.5–5.2)
PROT SERPL-MCNC: 6 G/DL (ref 6–8.5)
QT INTERVAL: 330 MS
QTC INTERVAL: 415 MS
SODIUM SERPL-SCNC: 133 MMOL/L (ref 136–145)

## 2023-11-14 PROCEDURE — 84132 ASSAY OF SERUM POTASSIUM: CPT | Performed by: OBSTETRICS & GYNECOLOGY

## 2023-11-14 PROCEDURE — 84100 ASSAY OF PHOSPHORUS: CPT | Performed by: OBSTETRICS & GYNECOLOGY

## 2023-11-14 PROCEDURE — 80053 COMPREHEN METABOLIC PANEL: CPT | Performed by: OBSTETRICS & GYNECOLOGY

## 2023-11-14 PROCEDURE — 25010000002 THIAMINE HCL 200 MG/2ML SOLUTION 2 ML VIAL: Performed by: OBSTETRICS & GYNECOLOGY

## 2023-11-14 PROCEDURE — 25010000002 POTASSIUM CHLORIDE PER 2 MEQ OF POTASSIUM: Performed by: OBSTETRICS & GYNECOLOGY

## 2023-11-14 PROCEDURE — 25010000002 ENOXAPARIN PER 10 MG: Performed by: OBSTETRICS & GYNECOLOGY

## 2023-11-14 PROCEDURE — 25010000002 POTASSIUM CHLORIDE 10 MEQ/100ML SOLUTION: Performed by: OBSTETRICS & GYNECOLOGY

## 2023-11-14 PROCEDURE — 25010000002 DIPHENHYDRAMINE PER 50 MG: Performed by: OBSTETRICS & GYNECOLOGY

## 2023-11-14 PROCEDURE — 25010000002 ONDANSETRON PER 1 MG: Performed by: OBSTETRICS & GYNECOLOGY

## 2023-11-14 PROCEDURE — 83735 ASSAY OF MAGNESIUM: CPT | Performed by: OBSTETRICS & GYNECOLOGY

## 2023-11-14 RX ORDER — POTASSIUM CHLORIDE 7.45 MG/ML
10 INJECTION INTRAVENOUS
Status: DISPENSED | OUTPATIENT
Start: 2023-11-14 | End: 2023-11-14

## 2023-11-14 RX ORDER — POTASSIUM CHLORIDE 7.45 MG/ML
10 INJECTION INTRAVENOUS
Status: COMPLETED | OUTPATIENT
Start: 2023-11-14 | End: 2023-11-15

## 2023-11-14 RX ADMIN — FAMOTIDINE 20 MG: 10 INJECTION INTRAVENOUS at 09:18

## 2023-11-14 RX ADMIN — POTASSIUM CHLORIDE 10 MEQ: 7.46 INJECTION, SOLUTION INTRAVENOUS at 23:52

## 2023-11-14 RX ADMIN — POTASSIUM CHLORIDE 10 MEQ: 7.46 INJECTION, SOLUTION INTRAVENOUS at 22:21

## 2023-11-14 RX ADMIN — DIPHENHYDRAMINE HYDROCHLORIDE 25 MG: 50 INJECTION, SOLUTION INTRAMUSCULAR; INTRAVENOUS at 17:07

## 2023-11-14 RX ADMIN — POTASSIUM CHLORIDE 10 MEQ: 7.46 INJECTION, SOLUTION INTRAVENOUS at 15:08

## 2023-11-14 RX ADMIN — Medication 10 ML: at 20:42

## 2023-11-14 RX ADMIN — DEXTROSE MONOHYDRATE 75 ML/HR: 100 INJECTION, SOLUTION INTRAVENOUS at 06:32

## 2023-11-14 RX ADMIN — FAMOTIDINE 20 MG: 10 INJECTION INTRAVENOUS at 20:42

## 2023-11-14 RX ADMIN — ONDANSETRON 4 MG: 2 INJECTION INTRAMUSCULAR; INTRAVENOUS at 12:35

## 2023-11-14 RX ADMIN — ONDANSETRON 4 MG: 2 INJECTION INTRAMUSCULAR; INTRAVENOUS at 09:15

## 2023-11-14 RX ADMIN — ONDANSETRON 4 MG: 2 INJECTION INTRAMUSCULAR; INTRAVENOUS at 00:36

## 2023-11-14 RX ADMIN — DIPHENHYDRAMINE HYDROCHLORIDE 25 MG: 50 INJECTION, SOLUTION INTRAMUSCULAR; INTRAVENOUS at 04:22

## 2023-11-14 RX ADMIN — ENOXAPARIN SODIUM 70 MG: 100 INJECTION SUBCUTANEOUS at 23:54

## 2023-11-14 RX ADMIN — DEXTROSE MONOHYDRATE 75 ML/HR: 100 INJECTION, SOLUTION INTRAVENOUS at 23:52

## 2023-11-14 RX ADMIN — ONDANSETRON 4 MG: 2 INJECTION INTRAMUSCULAR; INTRAVENOUS at 04:22

## 2023-11-14 RX ADMIN — Medication 10 ML: at 09:16

## 2023-11-14 RX ADMIN — DIPHENHYDRAMINE HYDROCHLORIDE 25 MG: 50 INJECTION, SOLUTION INTRAMUSCULAR; INTRAVENOUS at 22:21

## 2023-11-14 RX ADMIN — ENOXAPARIN SODIUM 70 MG: 100 INJECTION SUBCUTANEOUS at 00:36

## 2023-11-14 RX ADMIN — POTASSIUM CHLORIDE 10 MEQ: 7.46 INJECTION, SOLUTION INTRAVENOUS at 12:36

## 2023-11-14 RX ADMIN — ONDANSETRON 4 MG: 2 INJECTION INTRAMUSCULAR; INTRAVENOUS at 20:42

## 2023-11-14 RX ADMIN — ONDANSETRON 4 MG: 2 INJECTION INTRAMUSCULAR; INTRAVENOUS at 17:07

## 2023-11-14 RX ADMIN — POTASSIUM CHLORIDE 999 ML/HR: 2 INJECTION, SOLUTION, CONCENTRATE INTRAVENOUS at 09:15

## 2023-11-14 RX ADMIN — PANTOPRAZOLE SODIUM 40 MG: 40 INJECTION, POWDER, FOR SOLUTION INTRAVENOUS at 07:06

## 2023-11-14 RX ADMIN — ENOXAPARIN SODIUM 70 MG: 100 INJECTION SUBCUTANEOUS at 12:36

## 2023-11-14 RX ADMIN — DIPHENHYDRAMINE HYDROCHLORIDE 25 MG: 50 INJECTION, SOLUTION INTRAMUSCULAR; INTRAVENOUS at 09:15

## 2023-11-14 NOTE — PLAN OF CARE
Goal Outcome Evaluation:  Plan of Care Reviewed With: patient           Outcome Evaluation: VSS. Patient up ad halle. D10 as ordered. Replaced potassium via IV banana bag and 20 meq IV. To retest this evening. Tolerating diet. No reports of emesis for this shift.

## 2023-11-14 NOTE — PLAN OF CARE
Goal Outcome Evaluation:  Plan of Care Reviewed With: patient        Progress: improving  Outcome Evaluation: Pt states that left chest/mid sternal area discomfort continues especially with certain movements. CT angio negative for PE. Pt cooperative with use of scds and lovenox given as ordered. afebrile. HR in 90's. Saturations 100% to high 90%. No resp s/s. No emesis tonight and pt states nausea is better.      C/O what pt thinks is constipation. Drinking some juice --encouraged pt to ask Dr about stool softener or laxative

## 2023-11-14 NOTE — PROGRESS NOTES
"Nutrition Services    Patient Name:  Osbaldo Mayo  YOB: 1995  MRN: [id number]  Admit Date:  11/3/2023    Assessment Date:  23    Follow up:   PICC removed after doppler completed showing DVT in LUE. Peripheral placed. Lovenox started and TPN stopped for now pending further MFM recommendations. MFM recommendations are Thorazine IV BID, Scopolamine patch q 72 hrs, Banana bag daily, continue reglan, protonix, and aggressive electrolyte replacement. MF does not recommend restarting TPN, if pt unable to tolerate po diet/oral supplements, start enteral nutrition. Pt on Regular diet with Boost Breeze TID and tolerating with only small amounts of po intake. Pt continues with N/V and taking scheduled benadryl and zofran but refusing phenergan. D5W@125ml/hr now.     Recommend:  Continue Boost Breeze.  Boost Plus BID, will order.   May need enteral nutrition if unable to tolerate po diet/ONS.    Will cont to follow clinical course and ability to tolerate po diet and ONS.    CLINICAL NUTRITION       Reason for Assessment Follow-up Protocol     Diagnosis/Problem   Hyperemesis Gravidarum   -27 yo  with EDC 24 presented with ongoing issues with nausea and vomiting, causing dehydration and hypokalemia/e'lyte abnormality. She has reportedly lost 30 lb with this pregnancy. Getting IV fluids and anti-nausea meds.       Anthropometrics        Current Height  Current Weight  BMI kg/m2 Height: 172.7 cm (68\")  Weight: 72.7 kg (160 lb 4.4 oz) (23 0530)  Body mass index is 24.37 kg/m².   Adjusted BMI (if applicable)    BMI Category Overweight (25 - 29.9)   Ideal Body Weight (IBW) 140#   Usual Body Weight (UBW) 179# 2023   Weight Trend Other: pt reported 30 lb loss?         Estimated/Assessed Needs        Current Weight  Weight: 72.7 kg (160 lb 4.4 oz) (23 0530)       Energy Requirements    Weight for Calculation 79.4 kg   Method for Estimation  25 kcal/kg, 30 kcal/kg   EST Needs (kcal/day) " "[id number]       Protein Requirements    Weight for Calculation 79.4 kg   EST Protein Needs (g/kg) 1.0 - 1.2 gm/kg   EST Daily Needs (g/day) 79-95       Fluid Requirements     Method for Estimation 30 mL/kg    EST Needs (mL/day) 2382      Labs       Pertinent Labs    Results from last 7 days   Lab Units 11/14/23  0601 11/13/23  0508 11/12/23  2231 11/12/23  0725   SODIUM mmol/L 133* 131*  --  133*   POTASSIUM mmol/L 3.6 4.3 3.7 3.5  3.5   CHLORIDE mmol/L 104 104  --  104   CO2 mmol/L 21.3* 18.0*  --  21.0*   BUN mg/dL 4* 7  --  5*   CREATININE mg/dL 0.38* 0.38*  --  0.38*   CALCIUM mg/dL 8.7 8.7  --  8.9   BILIRUBIN mg/dL <0.2 <0.2  --  0.3   ALK PHOS U/L 72 68  --  68   ALT (SGPT) U/L 6 9  --  6   AST (SGOT) U/L 8 11  --  8   GLUCOSE mg/dL 93 112*  --  105*     Results from last 7 days   Lab Units 11/14/23  0601 11/13/23  0508 11/12/23  0725 11/11/23  0623 11/10/23  1912   MAGNESIUM mg/dL 1.7 1.9 1.6   < >  --    PHOSPHORUS mg/dL 2.9 2.8 3.0   < >  --    TRIGLYCERIDES mg/dL  --   --   --   --  79   ALBUMIN g/dL 2.9* 3.1* 3.2*   < > 3.1*    < > = values in this interval not displayed.           No results found for: \"COVID19\"  No results found for: \"HGBA1C\"       Medications           Scheduled Medications diphenhydrAMINE, 25 mg, Intravenous, Q6H  enoxaparin, 1 mg/kg, Subcutaneous, Q12H  famotidine, 20 mg, Intravenous, BID  metoclopramide, 10 mg, Oral, TID AC  ondansetron, 4 mg, Intravenous, Q4H  pantoprazole, 40 mg, Intravenous, Q AM  potassium chloride, 10 mEq, Intravenous, Q1H  Scopolamine, 1 patch, Transdermal, Q72H  sodium chloride, 10 mL, Intravenous, Q12H  sodium chloride, 10 mL, Intravenous, Q12H  sodium chloride, 10 mL, Intravenous, Q12H  thiamine (B-1) 100 mg, folic acid 1 mg, potassium chloride 20 mEq in dextrose 5 % and sodium chloride 0.45 % 1,000 mL infusion, 999 mL/hr, Intravenous, Daily       Infusions dextrose, 75 mL/hr, Last Rate: 75 mL/hr (11/14/23 0632)  dextrose 5 % and sodium chloride 0.45 %, " 125 mL/hr, Last Rate: 125 mL/hr (11/11/23 3011)  Pharmacy to Dose TPN,        PRN Medications   calcium carbonate    Calcium Replacement - Follow Nurse / BPA Driven Protocol    chlorproMAZINE HCl (THORAZINE) 50 mg in sodium chloride 0.9 % 50 mL IVPB    cyclobenzaprine    doxylamine-pyridoxine 25-25 mg combo dose    famotidine    Magnesium Standard Dose Replacement - Follow Nurse / BPA Driven Protocol    Pharmacy to Dose TPN    phenol    Phosphorus Replacement - Follow Nurse / BPA Driven Protocol    Potassium Replacement - Follow Nurse / BPA Driven Protocol    Potassium Replacement - Follow Nurse / BPA Driven Protocol    promethazine    sodium chloride    sodium chloride    sodium chloride    sodium chloride     Physical Findings          General Findings alert, oriented, other: pregnant   Oral/Mouth Cavity other:sore throat   Edema  no edema   Gastrointestinal Nausea, vomiting, Last BM  11/12   Skin  skin intact   Tubes/Drains/Lines none   NFPE Not indicated at this time   --  Current Nutrition Orders & Evaluation of Intake       Oral Nutrition     Food Allergies NKFA   Current PO Diet Diet: Regular/House Diet; Texture: Regular Texture (IDDSI 7); Fluid Consistency: Thin (IDDSI 0)   Supplement Boost Breeze, TID   PO Evaluation     % PO Intake Very small amounts of po     Factors Affecting Intake: nausea, vomiting, weakness   --  PES STATEMENT / NUTRITION DIAGNOSIS      Nutrition Dx Problem  Problem: Inadequate Oral Intake  Etiology: Medical Diagnosis - Hyperemesis Gravidarum    Signs/Symptoms: PO Diet Not Tolerated     NUTRITION INTERVENTION / PLAN OF CARE      Intervention Goal(s) Reduce/improve symptoms, Disease management/therapy, Initiate feeding/diet, and Maintain weight         RD Intervention/Action Supplement provided, Encourage intake, Continue to monitor, Care plan reviewed, and Recommend/order: ONS   --      Prescription/Orders:       PO Diet       Supplements Boost Breeze TID, Boost Plus BID      Enteral  Nutrition       Parenteral Nutrition    New Prescription Ordered? Yes         Monitor/Evaluation Per protocol, I&O, PO intake, Pertinent labs, Weight, GI status, Symptoms, POC/GOC   Discharge Plan/Needs Pending clinical course       RD to follow per protocol.  Electronically signed by:  Ileana Mclain RD  11/14/23 09:47 EST

## 2023-11-15 ENCOUNTER — TELEPHONE (OUTPATIENT)
Dept: OBSTETRICS AND GYNECOLOGY | Facility: CLINIC | Age: 28
End: 2023-11-15
Payer: COMMERCIAL

## 2023-11-15 VITALS
RESPIRATION RATE: 16 BRPM | HEART RATE: 100 BPM | BODY MASS INDEX: 25.26 KG/M2 | SYSTOLIC BLOOD PRESSURE: 126 MMHG | WEIGHT: 166.67 LBS | DIASTOLIC BLOOD PRESSURE: 72 MMHG | OXYGEN SATURATION: 100 % | HEIGHT: 68 IN | TEMPERATURE: 97.4 F

## 2023-11-15 LAB
ALBUMIN SERPL-MCNC: 2.9 G/DL (ref 3.5–5.2)
ALBUMIN/GLOB SERPL: 1 G/DL
ALP SERPL-CCNC: 76 U/L (ref 39–117)
ALT SERPL W P-5'-P-CCNC: 8 U/L (ref 1–33)
ANION GAP SERPL CALCULATED.3IONS-SCNC: 7.8 MMOL/L (ref 5–15)
AST SERPL-CCNC: 9 U/L (ref 1–32)
BILIRUB SERPL-MCNC: <0.2 MG/DL (ref 0–1.2)
BUN SERPL-MCNC: 5 MG/DL (ref 6–20)
BUN/CREAT SERPL: 10.4 (ref 7–25)
CALCIUM SPEC-SCNC: 9 MG/DL (ref 8.6–10.5)
CHLORIDE SERPL-SCNC: 104 MMOL/L (ref 98–107)
CO2 SERPL-SCNC: 22.2 MMOL/L (ref 22–29)
CREAT SERPL-MCNC: 0.48 MG/DL (ref 0.57–1)
EGFRCR SERPLBLD CKD-EPI 2021: 130.9 ML/MIN/1.73
GLOBULIN UR ELPH-MCNC: 2.9 GM/DL
GLUCOSE SERPL-MCNC: 94 MG/DL (ref 65–99)
MAGNESIUM SERPL-MCNC: 1.6 MG/DL (ref 1.6–2.6)
PHOSPHATE SERPL-MCNC: 2.7 MG/DL (ref 2.5–4.5)
POTASSIUM SERPL-SCNC: 3.8 MMOL/L (ref 3.5–5.2)
POTASSIUM SERPL-SCNC: 4.1 MMOL/L (ref 3.5–5.2)
PROT SERPL-MCNC: 5.8 G/DL (ref 6–8.5)
SODIUM SERPL-SCNC: 134 MMOL/L (ref 136–145)

## 2023-11-15 PROCEDURE — 25010000002 ONDANSETRON PER 1 MG: Performed by: OBSTETRICS & GYNECOLOGY

## 2023-11-15 PROCEDURE — 25010000002 ENOXAPARIN PER 10 MG: Performed by: OBSTETRICS & GYNECOLOGY

## 2023-11-15 PROCEDURE — 25010000002 THIAMINE HCL 200 MG/2ML SOLUTION 2 ML VIAL: Performed by: OBSTETRICS & GYNECOLOGY

## 2023-11-15 PROCEDURE — 80053 COMPREHEN METABOLIC PANEL: CPT | Performed by: OBSTETRICS & GYNECOLOGY

## 2023-11-15 PROCEDURE — 84100 ASSAY OF PHOSPHORUS: CPT | Performed by: OBSTETRICS & GYNECOLOGY

## 2023-11-15 PROCEDURE — 25010000002 DIPHENHYDRAMINE PER 50 MG: Performed by: OBSTETRICS & GYNECOLOGY

## 2023-11-15 PROCEDURE — 25010000002 POTASSIUM CHLORIDE PER 2 MEQ OF POTASSIUM: Performed by: OBSTETRICS & GYNECOLOGY

## 2023-11-15 PROCEDURE — 84132 ASSAY OF SERUM POTASSIUM: CPT | Performed by: OBSTETRICS & GYNECOLOGY

## 2023-11-15 PROCEDURE — 25010000002 POTASSIUM CHLORIDE 10 MEQ/100ML SOLUTION: Performed by: OBSTETRICS & GYNECOLOGY

## 2023-11-15 PROCEDURE — 83735 ASSAY OF MAGNESIUM: CPT | Performed by: OBSTETRICS & GYNECOLOGY

## 2023-11-15 RX ORDER — SCOLOPAMINE TRANSDERMAL SYSTEM 1 MG/1
1 PATCH, EXTENDED RELEASE TRANSDERMAL
Qty: 5 PATCH | Refills: 0 | Status: SHIPPED | OUTPATIENT
Start: 2023-11-16 | End: 2023-12-01

## 2023-11-15 RX ORDER — ONDANSETRON 4 MG/1
4 TABLET, ORALLY DISINTEGRATING ORAL EVERY 4 HOURS PRN
Qty: 90 TABLET | Refills: 0 | Status: SHIPPED | OUTPATIENT
Start: 2023-11-15 | End: 2023-11-30

## 2023-11-15 RX ORDER — METOCLOPRAMIDE 10 MG/1
10 TABLET ORAL
Qty: 45 TABLET | Refills: 0 | Status: SHIPPED | OUTPATIENT
Start: 2023-11-15 | End: 2023-11-15 | Stop reason: HOSPADM

## 2023-11-15 RX ORDER — ENOXAPARIN SODIUM 100 MG/ML
1 INJECTION SUBCUTANEOUS EVERY 12 HOURS
Qty: 24 ML | Refills: 0 | Status: SHIPPED | OUTPATIENT
Start: 2023-11-16 | End: 2023-12-02

## 2023-11-15 RX ORDER — CHLORPROMAZINE HYDROCHLORIDE 50 MG/1
50 TABLET, FILM COATED ORAL 2 TIMES DAILY
Qty: 30 TABLET | Refills: 0 | Status: SHIPPED | OUTPATIENT
Start: 2023-11-15 | End: 2023-11-30

## 2023-11-15 RX ORDER — PANTOPRAZOLE SODIUM 40 MG/1
40 TABLET, DELAYED RELEASE ORAL DAILY
Qty: 15 TABLET | Refills: 0 | Status: SHIPPED | OUTPATIENT
Start: 2023-11-15 | End: 2023-11-30

## 2023-11-15 RX ADMIN — POTASSIUM CHLORIDE 999 ML/HR: 2 INJECTION, SOLUTION, CONCENTRATE INTRAVENOUS at 08:13

## 2023-11-15 RX ADMIN — ONDANSETRON 4 MG: 2 INJECTION INTRAMUSCULAR; INTRAVENOUS at 08:14

## 2023-11-15 RX ADMIN — ONDANSETRON 4 MG: 2 INJECTION INTRAMUSCULAR; INTRAVENOUS at 05:09

## 2023-11-15 RX ADMIN — POTASSIUM CHLORIDE 10 MEQ: 7.46 INJECTION, SOLUTION INTRAVENOUS at 03:20

## 2023-11-15 RX ADMIN — ONDANSETRON 4 MG: 2 INJECTION INTRAMUSCULAR; INTRAVENOUS at 01:12

## 2023-11-15 RX ADMIN — ENOXAPARIN SODIUM 70 MG: 100 INJECTION SUBCUTANEOUS at 11:37

## 2023-11-15 RX ADMIN — DIPHENHYDRAMINE HYDROCHLORIDE 25 MG: 50 INJECTION, SOLUTION INTRAMUSCULAR; INTRAVENOUS at 05:09

## 2023-11-15 RX ADMIN — ONDANSETRON 4 MG: 2 INJECTION INTRAMUSCULAR; INTRAVENOUS at 11:45

## 2023-11-15 RX ADMIN — POTASSIUM CHLORIDE 10 MEQ: 7.46 INJECTION, SOLUTION INTRAVENOUS at 01:12

## 2023-11-15 RX ADMIN — Medication 10 ML: at 08:16

## 2023-11-15 RX ADMIN — PANTOPRAZOLE SODIUM 40 MG: 40 INJECTION, POWDER, FOR SOLUTION INTRAVENOUS at 06:43

## 2023-11-15 RX ADMIN — DIPHENHYDRAMINE HYDROCHLORIDE 25 MG: 50 INJECTION, SOLUTION INTRAMUSCULAR; INTRAVENOUS at 11:41

## 2023-11-15 RX ADMIN — FAMOTIDINE 20 MG: 10 INJECTION INTRAVENOUS at 08:16

## 2023-11-15 NOTE — DISCHARGE SUMMARY
Discharge Summary    [facility]   Osbaldo Mayo  : 1995  MRN: [id number]  CSN: [id number]        Date of Admission and Time: 11/3/2023  7:31 AM    Date of Discharge and Time:  11/15/23                    Admission Diagnosis: Hyperemesis gravidarum [O21.0]  Pregnancy [Z34.90]  hypokalemia     Discharge Diagnosis: Hyperemesis gravidarum [O21.0]  Pregnancy [Z34.90]  Left upper arm DVT after PICC line placement for TPN currently on lovenox  Resolution of hypokalemia  Now at 11 weeks 4 days               Presenting Problem/History of Present Illness  Hyperemesis gravidarum [O21.0]  Pregnancy [Z34.90]     Patient Active Problem List   Diagnosis    Hyperemesis gravidarum    Pregnancy         Hospital Course  Patient is a 28 y.o. female  at 11w4d  today who was admitted on 11/3/23 due to nausea and vomiting which was c/w hyperemesis gravidarum which was severe leading to hypokalemia.  She was placed on zofran, benadryl, pepcid, protonix and did not improve and on Friday Nov 10 she was started on TPN and a PICC line was placed.  She developed pain in the left upper shoulder and doppler US was obtained which revealed left upper extremity DVT on  and she was placed on therapeutic Lovenox at 1 mg/kg as per direction of Cele Goldberg who had seen the patient on 23 please see her note for details.    Patient improved on current anti-emetic regimen: zofran, scopolamine, thorazine, pepcid and protonix. K+ repletion protocol enacted.   Today she was able to tolerate her diet orally and stated she was ready for discharge.  She will be discharged to home and will follow up with Davin Whitaker MD at [facility] early next week, case was discussed with Dr Sherman MILLER who states he will accept the patient.          Condition on Discharge:  Stable    Vital Signs  Temp:  [97.4 °F (36.3 °C)-99 °F (37.2 °C)] 97.4 °F (36.3 °C)  Heart Rate:  [] 100  Resp:  [16] 16  BP: (106-126)/(64-74)  126/72    Physical Exam on Discharge        General:    well developed; well nourished  no acute distress         Heart:     Not performed.         Head:     normocephalic        Lungs:     breathing is unlabored         Back:       CVA tenderness is absent, Low back pain absent  Abdomen:     soft, non-tender; no masses  no umbilical or inguinal hernias are present  no hepato-splenomegaly       FHT's:      150 BPM      Cervix:      was not checked.                           Contractions:      none            Extremities:       peripheral pulses normal, no pedal edema, no clubbing or cyanosis            Recent Results (from the past 72 hour(s))   Potassium    Collection Time: 11/12/23 10:31 PM    Specimen: Blood   Result Value Ref Range    Potassium 3.7 3.5 - 5.2 mmol/L   Comprehensive Metabolic Panel    Collection Time: 11/13/23  5:08 AM    Specimen: Blood   Result Value Ref Range    Glucose 112 (H) 65 - 99 mg/dL    BUN 7 6 - 20 mg/dL    Creatinine 0.38 (L) 0.57 - 1.00 mg/dL    Sodium 131 (L) 136 - 145 mmol/L    Potassium 4.3 3.5 - 5.2 mmol/L    Chloride 104 98 - 107 mmol/L    CO2 18.0 (L) 22.0 - 29.0 mmol/L    Calcium 8.7 8.6 - 10.5 mg/dL    Total Protein 5.8 (L) 6.0 - 8.5 g/dL    Albumin 3.1 (L) 3.5 - 5.2 g/dL    ALT (SGPT) 9 1 - 33 U/L    AST (SGOT) 11 1 - 32 U/L    Alkaline Phosphatase 68 39 - 117 U/L    Total Bilirubin <0.2 0.0 - 1.2 mg/dL    Globulin 2.7 gm/dL    A/G Ratio 1.1 g/dL    BUN/Creatinine Ratio 18.4 7.0 - 25.0    Anion Gap 9.0 5.0 - 15.0 mmol/L    eGFR 138.4 >60.0 mL/min/1.73   Magnesium    Collection Time: 11/13/23  5:08 AM    Specimen: Blood   Result Value Ref Range    Magnesium 1.9 1.6 - 2.6 mg/dL   Phosphorus    Collection Time: 11/13/23  5:08 AM    Specimen: Blood   Result Value Ref Range    Phosphorus 2.8 2.5 - 4.5 mg/dL   Duplex Venous Upper Extremity - Left CAR    Collection Time: 11/13/23 10:18 AM   Result Value Ref Range    Left Axillary Color 1.0     Left Basilic Upper Color 1.0     Left  Cephalic Forearm Color 1.0     Right Internal Jugular Spont Y     Right Internal Jugular Phasic Y     Right Internal Jugular Compress C     Right Internal Jugular Augment Y     Right Subclavian Spont Y     Right Subclavian Phasic Y     Right Subclavian Compress C     Right Subclavian Augment Y     Left Internal Jugular Spont Y     Left Internal Jugular Phasic Y     Left Internal Jugular Compress C     Left Internal Jugular Augment Y     Left Subclavian Spont Y     Left Subclavian Phasic Y     Left Subclavian Compress C     Left Subclavian Augment Y     Left Axillary Spont N     Left Axillary Phasic N     Left Axillary Compress P     Left Axillary Augment D     Left Axillary Thrombus A     Left Brachial Compress C     Left Radial Compress C     Left Ulnar Compress C     Left Basilic Upper Compress N     Left Basilic Upper Thrombus A     Left Basilic Forearm Compress C     Left Cephalic Upper Compress C     Left Cephalic Forearm Compress N     Left Cephalic Forearm Thrombus A     BH CV VAS PRELIMINARY FINDINGS SCRIPTING 1.0    ECG 12 Lead Chest Pain    Collection Time: 11/13/23  6:53 PM   Result Value Ref Range    QT Interval 330 ms    QTC Interval 415 ms   Comprehensive Metabolic Panel    Collection Time: 11/14/23  6:01 AM    Specimen: Blood   Result Value Ref Range    Glucose 93 65 - 99 mg/dL    BUN 4 (L) 6 - 20 mg/dL    Creatinine 0.38 (L) 0.57 - 1.00 mg/dL    Sodium 133 (L) 136 - 145 mmol/L    Potassium 3.6 3.5 - 5.2 mmol/L    Chloride 104 98 - 107 mmol/L    CO2 21.3 (L) 22.0 - 29.0 mmol/L    Calcium 8.7 8.6 - 10.5 mg/dL    Total Protein 6.0 6.0 - 8.5 g/dL    Albumin 2.9 (L) 3.5 - 5.2 g/dL    ALT (SGPT) 6 1 - 33 U/L    AST (SGOT) 8 1 - 32 U/L    Alkaline Phosphatase 72 39 - 117 U/L    Total Bilirubin <0.2 0.0 - 1.2 mg/dL    Globulin 3.1 gm/dL    A/G Ratio 0.9 g/dL    BUN/Creatinine Ratio 10.5 7.0 - 25.0    Anion Gap 7.7 5.0 - 15.0 mmol/L    eGFR 138.4 >60.0 mL/min/1.73   Magnesium    Collection Time: 11/14/23   6:01 AM    Specimen: Blood   Result Value Ref Range    Magnesium 1.7 1.6 - 2.6 mg/dL   Phosphorus    Collection Time: 11/14/23  6:01 AM    Specimen: Blood   Result Value Ref Range    Phosphorus 2.9 2.5 - 4.5 mg/dL   Potassium    Collection Time: 11/14/23  7:07 PM    Specimen: Blood   Result Value Ref Range    Potassium 3.6 3.5 - 5.2 mmol/L   Comprehensive Metabolic Panel    Collection Time: 11/15/23  5:35 AM    Specimen: Blood   Result Value Ref Range    Glucose 94 65 - 99 mg/dL    BUN 5 (L) 6 - 20 mg/dL    Creatinine 0.48 (L) 0.57 - 1.00 mg/dL    Sodium 134 (L) 136 - 145 mmol/L    Potassium 4.1 3.5 - 5.2 mmol/L    Chloride 104 98 - 107 mmol/L    CO2 22.2 22.0 - 29.0 mmol/L    Calcium 9.0 8.6 - 10.5 mg/dL    Total Protein 5.8 (L) 6.0 - 8.5 g/dL    Albumin 2.9 (L) 3.5 - 5.2 g/dL    ALT (SGPT) 8 1 - 33 U/L    AST (SGOT) 9 1 - 32 U/L    Alkaline Phosphatase 76 39 - 117 U/L    Total Bilirubin <0.2 0.0 - 1.2 mg/dL    Globulin 2.9 gm/dL    A/G Ratio 1.0 g/dL    BUN/Creatinine Ratio 10.4 7.0 - 25.0    Anion Gap 7.8 5.0 - 15.0 mmol/L    eGFR 130.9 >60.0 mL/min/1.73   Magnesium    Collection Time: 11/15/23  5:35 AM    Specimen: Blood   Result Value Ref Range    Magnesium 1.6 1.6 - 2.6 mg/dL   Phosphorus    Collection Time: 11/15/23  5:35 AM    Specimen: Blood   Result Value Ref Range    Phosphorus 2.7 2.5 - 4.5 mg/dL   Potassium    Collection Time: 11/15/23  7:41 AM    Specimen: Blood   Result Value Ref Range    Potassium 3.8 3.5 - 5.2 mmol/L             Discharge Disposition  Home or Self Care    Discharge Medications     Discharge Medications        New Medications        Instructions Start Date   chlorproMAZINE 50 MG tablet  Commonly known as: THORAZINE   50 mg, Oral, 2 Times Daily      Enoxaparin Sodium 80 MG/0.8ML solution prefilled syringe syringe  Commonly known as: LOVENOX   Discard 0.1mL and Inject 0.7 mL under the skin into the appropriate area as directed Every 12 (Twelve) Hours for 15 days. Indications: DVT/PE  (active thrombosis)   Start Date: November 16, 2023     pantoprazole 40 MG EC tablet  Commonly known as: PROTONIX   40 mg, Oral, Daily      Scopolamine 1 MG/3DAYS patch   1 patch, Transdermal, Every 72 Hours   Start Date: November 16, 2023            Changes to Medications        Instructions Start Date   ondansetron ODT 4 MG disintegrating tablet  Commonly known as: ZOFRAN-ODT  What changed:   medication strength  how much to take  when to take this   4 mg, Translingual, Every 4 Hours PRN             Stop These Medications      Bonjesta 20-20 MG tablet controlled-release tablet  Generic drug: doxylamine-pyridoxine ER     doxylamine 25 MG tablet  Commonly known as: UNISOM              Discharge Diet: Regular    Activity at Discharge:   Activity Instructions       Activity as Tolerated              Follow-up Appointments  No future appointments.  Additional Instructions for the Follow-ups that You Need to Schedule       Discharge Follow-up with Specified Provider: Davni Whitaker MD; 5 Days   As directed      To: Davin Whitaker MD   Follow Up: 5 Days   Follow Up Details: follow up early next week with Davin Whitaker MD [facility]  [phone]                      Stephane Garza MD  11/15/23  14:44 EST

## 2023-11-15 NOTE — PLAN OF CARE
Goal Outcome Evaluation:  Plan of Care Reviewed With: patient           Outcome Evaluation: VSS. No emesis. Able to tolerate breakfast and lunch.    Lovenox teaching complete. Patient demonstrated proper injection

## 2023-11-15 NOTE — PLAN OF CARE
Goal Outcome Evaluation:  Plan of Care Reviewed With: patient        Progress: improving  Outcome Evaluation: No emesis. IVFs D10W continues. She is able to take some po. Potassium was still borderline low at 3.6. Replacement Potassium given. She knows that today she will need to attempt to give herself lovenox so that she can learn. She is afebrile and VS stable. Continues on scheduled meds--zofran,benadryl,pepcid etc

## 2023-11-15 NOTE — OBED NOTES
[facility]  Osbaldo Mayo  : 1995  MRN: [id number]  CSN: [id number]    Hospital Day: 12    CC: hospital follow-up for hyperemesis gravidarum, DVT    Antepartum Progress Note    Subjective   Ms. Mayo reports feeling less nauseous today, tolerating her breakfast, some mashed potatoes and a burger throughout the day. Other than discomfort from her potassium runs, she is noting no upper extremity pain. No dyspnea reported. She is having some mild pelvic cramping    Review of Systems       Objective     Min/max vitals past 24 hours:   Temp  Min: 97.1 °F (36.2 °C)  Max: 99 °F (37.2 °C)  BP  Min: 101/66  Max: 117/74  Pulse  Min: 84  Max: 95  Resp  Min: 16  Max: 16         General: well developed; well nourished  no acute distress   Heart: regular rate and rhythm   Lungs: breathing is unlabored   Abdomen: soft, non-tender; no masses   FHT's: Not checked   Cervix: was not checked.   Contractions: Not monitored   Back: CVA tenderness is deferred bilateral       Lab Results   Component Value Date    GLUCOSE 93 2023    BUN 4 (L) 2023    CREATININE 0.38 (L) 2023    EGFR 138.4 2023    BCR 10.5 2023    K 3.6 2023    CO2 21.3 (L) 2023    CALCIUM 8.7 2023    ALBUMIN 2.9 (L) 2023    BILITOT <0.2 2023    AST 8 2023    ALT 6 2023            Assessment   IUP at 11w3d  Severe hyperemesis gravidarum with hypokalemia- improving on current anti-emetic regimen: zofran, scopolamine, thorazine, pepcid and protonix. K+ repletion protocol enacted.  DVT- following PICC line. No PE on CTA. Currently on lovenox.     Plan   Home tomorrow if still tolerating po, hypokalemia resolved. She will need to go home on current medications including lovenox. She desires to establish care with a physician at [facility].                Jaycee Owusu MD  2023  22:48 EST

## 2023-11-15 NOTE — TELEPHONE ENCOUNTER
LAW    Please call patient and have her scheduled as a new OB tomorrow or Friday morning.    Thanks    Sherman

## 2023-11-16 NOTE — CASE MANAGEMENT/SOCIAL WORK
Case Management Discharge Note      Final Note: Home. Dorothy MORRELL         Selected Continued Care - Discharged on 11/15/2023 Admission date: 11/3/2023 - Discharge disposition: Home or Self Care      Destination    No services have been selected for the patient.                Durable Medical Equipment    No services have been selected for the patient.                Dialysis/Infusion    No services have been selected for the patient.                Home Medical Care    No services have been selected for the patient.                Therapy    No services have been selected for the patient.                Community Resources    No services have been selected for the patient.                Community [facility] DME    No services have been selected for the patient.                         Final Discharge Disposition Code: 01 - home or self-care

## 2023-11-16 NOTE — PAYOR COMM NOTE
"Osbaldo Ralph (28 y.o. Female)                           ATTENTION; CONTINUED CLINICALS CASE REF [id number]                         REPLY TO UR DEPT  [phone] OR CALL   SAVITA VARNER [place]           Date of Birth   1995    Social Security Number       Address   [address] [facility] [address]    Home Phone   [phone]    MRN   [id number]       Faith   None    Marital Status   Single                            Admission Date   11/3/23    Admission Type   Emergency    Admitting Provider   Jaycee Owusu MD    Attending Provider       Department, Room/Bed   [address], 78/1       Discharge Date   11/15/2023    Discharge Disposition   Home or Self Care    Discharge Destination                                 Attending Provider: (none)   Allergies: No Known Allergies    Isolation: None   Infection: None   Code Status: Prior    Ht: 172.7 cm (68\")   Wt: 75.6 kg (166 lb 10.7 oz)    Admission Cmt: None   Principal Problem: Hyperemesis gravidarum [O21.0]                   Active Insurance as of 11/3/2023       Primary Coverage       Payor Plan Insurance Group Employer/Plan Group    [facility] BY [facility]STR[facility] [id number]       Payor Plan Address Payor Plan Phone Number Payor Plan Fax Number Effective Dates    [address] [address]   1/1/2021 - None Entered    [facility] [id number]         Subscriber Name Subscriber Birth Date Member ID       OSBALDO RALPH 1995 [id number]                     Emergency Contacts        (Rel.) Home Phone Work Phone Mobile Phone    Carolyn Ralph (Mother) -- -- [phone]              Orders        Start     Ordered    11/16/23 0000  Enoxaparin Sodium (LOVENOX) 80 MG/0.8ML solution prefilled syringe syringe  Every 12 Hours         11/15/23 1325    11/16/23 0000  Scopolamine 1 MG/3DAYS patch  Every 72 Hours         11/15/23 1325    11/15/23 1317  Discontinue IV  Once,   Status:  " Canceled         11/15/23 1325    11/15/23 1302  Discharge patient  Once         11/15/23 1325    11/15/23 0750  Potassium  Timed         11/14/23 2149    11/15/23 0000  metoclopramide (REGLAN) 10 MG tablet  3 Times Daily Before Meals,   Status:  Discontinued         11/15/23 1325    11/15/23 0000  ondansetron ODT (ZOFRAN-ODT) 4 MG disintegrating tablet  Every 4 Hours PRN         11/15/23 1325    11/15/23 0000  pantoprazole (PROTONIX) 40 MG EC tablet  Daily         11/15/23 1325    11/15/23 0000  Discharge Follow-up with Specified Provider: Davin Whitaker MD; 5 Days         11/15/23 1325    11/15/23 0000  Activity as Tolerated         11/15/23 1325    11/15/23 0000  chlorproMAZINE (THORAZINE) 50 MG tablet  2 Times Daily         11/15/23 1441    11/14/23 2245  potassium chloride 10 mEq in 100 mL IVPB  Every 1 Hour         11/14/23 2149    11/14/23 1900  Potassium  Timed         11/14/23 1500    11/14/23 1811  Potassium  Timed,   Status:  Canceled         11/14/23 0810    11/14/23 0900  potassium chloride 10 mEq in 100 mL IVPB  Every 1 Hour         11/14/23 0810    11/13/23 2200  Dietary Nutrition Supplements Boost Breeze (Ensure Clear)  3 Times Daily,   Status:  Canceled      Comments: Can sub for whatever flavor she wants    11/13/23 1722    11/13/23 2100  pantoprazole (PROTONIX) injection 40 mg  Every 12 Hours Scheduled,   Status:  Discontinued         11/13/23 1713    11/13/23 1900  iopamidol (ISOVUE-370) 76 % injection 100 mL  Once in Imaging         11/13/23 1805    11/13/23 1815  metoclopramide (REGLAN) tablet 10 mg  3 Times Daily Before Meals,   Status:  Discontinued         11/13/23 1720    11/13/23 1800  scopolamine patch 1 mg/72 hr  Every 72 Hours,   Status:  Discontinued         11/13/23 1711    11/13/23 1800  thiamine (B-1) 100 mg, folic acid 1 mg, potassium chloride 20 mEq in dextrose 5 % and sodium chloride 0.45 % 1,000 mL infusion  Daily,   Status:  Discontinued         11/13/23 1714    11/13/23 1725   ECG 12 Lead Chest Pain  Once         11/13/23 1725    11/13/23 1715  Apply Heat To Affected Area  Once,   Status:  Canceled         11/13/23 1714    11/13/23 1714  cyclobenzaprine (FLEXERIL) tablet 10 mg  3 Times Daily PRN,   Status:  Discontinued         11/13/23 1714    11/13/23 1711  chlorproMAZINE HCl (THORAZINE) 50 mg in sodium chloride 0.9 % 50 mL IVPB  Every 12 Hours PRN,   Status:  Discontinued         11/13/23 1713    11/13/23 1643  promethazine (PHENERGAN) 12.5 mg in sodium chloride 0.9 % 50 mL  Every 4 Hours PRN,   Status:  Discontinued         11/13/23 1550    11/13/23 1600  ondansetron (ZOFRAN) injection 4 mg  Every 4 Hours,   Status:  Discontinued         11/13/23 1550    11/13/23 1328  CT Angiogram Chest  1 Time Imaging         11/13/23 1328    11/13/23 1200  Enoxaparin Sodium (LOVENOX) syringe 70 mg  Every 12 Hours,   Status:  Discontinued         11/13/23 1038    11/13/23 1034  PICC Line Removal  Once         11/13/23 1033    11/13/23 1002  Inpatient Maternal & Fetal Medicine Consult  Once        Specialty:  Maternal and Fetal Medicine  Provider:  Cele Goldberg MD    11/13/23 1009    11/13/23 0906  Duplex Venous Upper Extremity - Left CAR  Once         11/13/23 0906    11/12/23 2209  Potassium  Timed         11/12/23 1208    11/12/23 1938  XR Chest 1 View  1 Time Imaging         11/12/23 1938    11/12/23 1800  Adult Central 2-in-1 TPN  Continuous TPN,   Status:  Discontinued         11/12/23 1156    11/12/23 1800  Fat Emulsion Plant Based (Soy) 20 % infusion 20 g  Every 24 Hours Scheduled (TPN),   Status:  Discontinued         11/12/23 1156    11/12/23 1300  potassium chloride 10 mEq in 100 mL IVPB  Every 1 Hour         11/12/23 1208    11/12/23 0730  Potassium  Timed         11/12/23 0240    11/12/23 0600  Magnesium  Daily,   Status:  Canceled       11/11/23 1159    11/12/23 0600  Phosphorus  Daily,   Status:  Canceled       11/11/23 1159    11/12/23 0600  hCG, Quantitative, Pregnancy  Morning  Draw         11/11/23 1801    11/12/23 0400  Potassium  Timed,   Status:  Canceled         11/11/23 1801    11/12/23 0000  US Ob Limited 1 + Fetuses  1 Time Imaging        Comments: Ultrasound may be performed portable    11/11/23 1627    11/11/23 2145  sodium chloride 0.9 % flush 10 mL  Every 12 Hours Scheduled,   Status:  Discontinued         11/11/23 2053 11/11/23 2145  sodium chloride 0.9 % flush 10 mL  Every 12 Hours Scheduled,   Status:  Discontinued         11/11/23 2053 11/11/23 2056  Catheter Care PICC  Per Order Details,   Status:  Canceled        Comments: 1) Follow PICC Protocol For Care  2) No Blood Pressure in Arm With PICC  3) Warm Packs to PICC Arm As Needed For Discomfort  4) Measure & Record Arm Circumference if Patient Experiences Pain, Swelling, Redness or Warmth in PICC Arm; Compare Measurement to Initial Measure, Report to Provider if Greater Than 3cm Difference  5) Follow Flush Protocol Per Facility    11/11/23 2055 11/11/23 2056  Ensure Needleless Connectors are In Place  Per Order Details,   Status:  Canceled        Comments: Change Needleless Connectors Per [facility] Policy    11/11/23 2055 11/11/23 2056  No Venipuncture or BP in PICC Arm  Continuous,   Status:  Canceled         11/11/23 2055 11/11/23 2056  For Sluggish / Occluded Line, Use CATHFLO Activase Per Policy (Per IV Nurse Only)  Once,   Status:  Canceled        Comments: Per Facility Policy    11/11/23 2055 11/11/23 2056  Use 3CG Technology For Placement Verification (PICC Nurse)  Once         11/11/23 2055 11/11/23 2054  PICC LINE CARE - Change Transparent Dressing With CHG Disk & Securement Device Every 7 Days  Weekly,   Status:  Canceled        Comments: Per [facility] Policy    11/11/23 2053 11/11/23 2054  PICC LINE CARE - Connectors / Hubs Must Be Scrubbed 15 Seconds Using 70% Alcohol & Allowed to Dry Before Accessing Line  Continuous,   Status:  Canceled         11/11/23 2053 11/11/23 2054  PICC LINE CARE -  Change Needleless Connectors  Per Order Details,   Status:  Canceled        Comments: Per [facility] Policy    11/11/23 2053 11/11/23 2053  sodium chloride 0.9 % flush 10 mL  As Needed,   Status:  Discontinued         11/11/23 2053 11/11/23 2053  sodium chloride 0.9 % flush 20 mL  As Needed,   Status:  Discontinued         11/11/23 2053 11/11/23 2053  PICC LINE CARE - Change Dressing As Needed When Damp, Loose or Soiled  As Needed,   Status:  Canceled       11/11/23 2053 11/11/23 1900  potassium chloride 10 mEq in 100 mL IVPB  Every 1 Hour         11/11/23 1801    11/11/23 1800  Adult Peripheral or Midline 2-in-1 TPN  Continuous TPN,   Status:  Discontinued         11/11/23 1159    11/11/23 1800  Adult Peripheral or Midline 2-in-1 TPN  Continuous TPN         11/11/23 1214    11/11/23 1800  Inpatient IV Team Consult PICC 2 Lumens  Once        Provider:  (Not yet assigned)    11/11/23 1801    11/11/23 1800  RN To Release PICC Line Care Orders Once Line in Place  Once         11/11/23 1801    11/11/23 1628  Daily Weights  Daily,   Status:  Canceled       11/11/23 1627    11/11/23 1223  Potassium  Timed         11/11/23 0022    11/11/23 1030  acetaminophen (TYLENOL) tablet 650 mg  Once         11/11/23 0942    11/11/23 0600  pantoprazole (PROTONIX) injection 40 mg  Every Early Morning,   Status:  Discontinued         11/10/23 1730    11/11/23 0600  Magnesium  Morning Draw         11/10/23 1740    11/11/23 0600  Phosphorus  Morning Draw         11/10/23 1740    11/11/23 0400  phenol (CHLORASEPTIC) 1.4 % liquid 1 spray  Every 2 Hours PRN,   Status:  Discontinued         11/11/23 0401    11/11/23 0115  potassium chloride 10 mEq in 100 mL IVPB  Every 1 Hour         11/11/23 0022    11/10/23 1923  Potassium  Timed         11/10/23 0723    11/10/23 1830  dextrose 10 % infusion  Continuous,   Status:  Discontinued         11/10/23 1736    11/10/23 1741  Comprehensive Metabolic Panel  Daily,   Status:  Canceled        11/10/23 1740    11/10/23 1738  Triglycerides  Once         11/10/23 1740    11/10/23 1728  Inpatient Nutrition Consult  Once        Provider:  (Not yet assigned)    11/10/23 1728    11/10/23 1727  Pharmacy to Dose TPN  Continuous PRN,   Status:  Discontinued         11/10/23 1728    11/10/23 1619  Monitor Fetal Heart Tones  Once,   Status:  Canceled        Comments: Doppler fetal heart tones before discharge or until order modified.    11/10/23 1619    11/10/23 1242  doxylamine-pyridoxine 25-25 mg combo dose  2 Times Daily PRN,   Status:  Discontinued         11/10/23 1242    11/10/23 0815  potassium chloride 10 mEq in 100 mL IVPB  Every 1 Hour         11/10/23 0723    11/10/23 0719  Basic Metabolic Panel  Daily,   Status:  Canceled       11/10/23 0718    11/10/23 0719  Magnesium  Once         11/10/23 0718    11/10/23 0717  Potassium Replacement - Follow Nurse / BPA Driven Protocol  As Needed,   Status:  Discontinued         11/10/23 0718    11/10/23 0717  Magnesium Standard Dose Replacement - Follow Nurse / BPA Driven Protocol  As Needed,   Status:  Discontinued         11/10/23 0718    11/10/23 0717  Phosphorus Replacement - Follow Nurse / BPA Driven Protocol  As Needed,   Status:  Discontinued         11/10/23 0718    11/10/23 0717  Calcium Replacement - Follow Nurse / BPA Driven Protocol  As Needed,   Status:  Discontinued         11/10/23 0718    11/10/23 0717  Potassium Replacement - Follow Nurse / BPA Driven Protocol  As Needed,   Status:  Discontinued         11/10/23 0718    11/10/23 0600  Comprehensive Metabolic Panel  Morning Draw         11/09/23 1420    11/09/23 2000  Transfer Patient  Once        Comments: Transfer to [facility].    11/09/23 2000 11/08/23 1300  promethazine (PHENERGAN) 12.5 mg in sodium chloride 0.9 % 50 mL  Every 4 Hours,   Status:  Discontinued         11/08/23 1205    11/08/23 1300  diphenhydrAMINE (BENADRYL) injection 25 mg  Every 6 Hours,   Status:  Discontinued         11/08/23  1210    23 1506  calcium carbonate (TUMS) chewable tablet 500 mg (200 mg elemental)  3 Times Daily PRN,   Status:  Discontinued         23 1506    23 0915  famotidine (PEPCID) injection 20 mg  2 Times Daily,   Status:  Discontinued         23 0823    23 1830  famotidine (PEPCID) injection 20 mg  Every 12 Hours PRN,   Status:  Discontinued         23 1822    23 1600  Vital Signs q 4 while awake  Every 4 Hours,   Status:  Canceled      Comments: While the patient is awake.    23 1559    23 1400  dextrose 5 % and sodium chloride 0.45 % infusion  Continuous,   Status:  Discontinued         23 1300    23 1400  ondansetron (ZOFRAN) injection 8 mg  Every 8 Hours,   Status:  Discontinued         23 0958    23 1114  sodium chloride 0.9 % flush 10 mL  Every 12 Hours Scheduled,   Status:  Discontinued         23 1059    23 1057  Intake & Output  Every Shift,   Status:  Canceled       23 1059    23 1056  sodium chloride 0.9 % infusion 40 mL  As Needed,   Status:  Discontinued         23 1059    23 1056  sodium chloride 0.9 % flush 10 mL  As Needed,   Status:  Discontinued         23 1059    --  doxylamine (UNISOM) 25 MG tablet  Nightly PRN,   Status:  Discontinued         23 1101    --  SCANNED - TELEMETRY           23 0000    --  SCANNED - TELEMETRY           23 0000    --  SCANNED - TELEMETRY           23 0000    --  SCANNED - TELEMETRY           23 0000    --  SCANNED - TELEMETRY           23 0000    --  SCANNED - TELEMETRY           23 0000                     Physician Progress Notes                        Jaycee Owusu MD   Physician  OB/GYN     CLEMENCIA Notes     Signed     Date of Service: 23  Creation Time: 23     Signed          Megha Mayo  :   1995  MRN:   [id number]  CSN:    [id number]     Hospital Day: 12      CC: hospital follow-up for hyperemesis gravidarum, DVT     Antepartum Progress Note     Subjective   Ms. Mayo reports feeling less nauseous today, tolerating her breakfast, some mashed potatoes and a burger throughout the day. Other than discomfort from her potassium runs, she is noting no upper extremity pain. No dyspnea reported. She is having some mild pelvic cramping     Review of Systems     Objective      Min/max vitals past 24 hours:   Temp  Min: 97.1 °F (36.2 °C)  Max: 99 °F (37.2 °C)  BP  Min: 101/66  Max: 117/74  Pulse  Min: 84  Max: 95  Resp  Min: 16  Max: 16           General: well developed; well nourished  no acute distress   Heart: regular rate and rhythm   Lungs: breathing is unlabored   Abdomen: soft, non-tender; no masses   FHT's: Not checked   Cervix: was not checked.   Contractions: Not monitored   Back: CVA tenderness is deferred bilateral             Lab Results   Component Value Date     GLUCOSE 93 11/14/2023     BUN 4 (L) 11/14/2023     CREATININE 0.38 (L) 11/14/2023     EGFR 138.4 11/14/2023     BCR 10.5 11/14/2023     K 3.6 11/14/2023     CO2 21.3 (L) 11/14/2023     CALCIUM 8.7 11/14/2023     ALBUMIN 2.9 (L) 11/14/2023     BILITOT <0.2 11/14/2023     AST 8 11/14/2023     ALT 6 11/14/2023            Assessment   IUP at 11w3d  Severe hyperemesis gravidarum with hypokalemia- improving on current anti-emetic regimen: zofran, scopolamine, thorazine, pepcid and protonix. K+ repletion protocol enacted.  DVT- following PICC line. No PE on CTA. Currently on lovenox.  Plan   Home tomorrow if still tolerating po, hypokalemia resolved. She will need to go home on current medications including lovenox. She desires to establish care with a physician at [facility].                      Jaycee Owusu MD  11/14/2023  22:48 Ileana Lugo RD   Registered Dietitian  Nutrition     Progress Notes     Addendum     Date of Service: 11/14/23 0947  Creation Time: 11/14/23 0947    "    Nutrition Services     Patient Name:  Osbaldo Mayo  YOB: 1995  MRN: [id number]  Admit Date:  11/3/2023     Assessment Date:  23     Follow up:   PICC removed after doppler completed showing DVT in LUE. Peripheral placed. Lovenox started and TPN stopped for now pending further MFM recommendations. MFM recommendations are Thorazine IV BID, Scopolamine patch q 72 hrs, Banana bag daily, continue reglan, protonix, and aggressive electrolyte replacement. MF does not recommend restarting TPN, if pt unable to tolerate po diet/oral supplements, start enteral nutrition. Pt on Regular diet with Boost Breeze TID and tolerating with only small amounts of po intake. Pt continues with N/V and taking scheduled benadryl and zofran but refusing phenergan. D5W@125ml/hr now.      Recommend:  Continue Boost Breeze.  Boost Plus BID, will order.   May need enteral nutrition if unable to tolerate po diet/ONS.     Will cont to follow clinical course and ability to tolerate po diet and ONS.     CLINICAL NUTRITION         Reason for Assessment Follow-up Protocol      Diagnosis/Problem    Hyperemesis Gravidarum   -27 yo  with EDC 24 presented with ongoing issues with nausea and vomiting, causing dehydration and hypokalemia/e'lyte abnormality. She has reportedly lost 30 lb with this pregnancy. Getting IV fluids and anti-nausea meds.        Anthropometrics           Current Height  Current Weight  BMI kg/m2 Height: 172.7 cm (68\")  Weight: 72.7 kg (160 lb 4.4 oz) (23 0530)  Body mass index is 24.37 kg/m².   Adjusted BMI (if applicable)     BMI Category Overweight (25 - 29.9)   Ideal Body Weight (IBW) 140#   Usual Body Weight (UBW) 179# 2023   Weight Trend Other: pt reported 30 lb loss?                Estimated/Assessed Needs          Current Weight  Weight: 72.7 kg (160 lb 4.4 oz) (23 0530)         Energy Requirements     Weight for Calculation 79.4 kg   Method for Estimation  25 kcal/kg, 30 " "kcal/kg   EST Needs (kcal/day) 2349-8143         Protein Requirements     Weight for Calculation 79.4 kg   EST Protein Needs (g/kg) 1.0 - 1.2 gm/kg   EST Daily Needs (g/day) 79-95         Fluid Requirements      Method for Estimation 30 mL/kg    EST Needs (mL/day) 2382          Labs         Pertinent Labs             Results from last 7 days   Lab Units 11/14/23  0601 11/13/23  0508 11/12/23 2231 11/12/23  0725   SODIUM mmol/L 133* 131*  --  133*   POTASSIUM mmol/L 3.6 4.3 3.7 3.5  3.5   CHLORIDE mmol/L 104 104  --  104   CO2 mmol/L 21.3* 18.0*  --  21.0*   BUN mg/dL 4* 7  --  5*   CREATININE mg/dL 0.38* 0.38*  --  0.38*   CALCIUM mg/dL 8.7 8.7  --  8.9   BILIRUBIN mg/dL <0.2 <0.2  --  0.3   ALK PHOS U/L 72 68  --  68   ALT (SGPT) U/L 6 9  --  6   AST (SGOT) U/L 8 11  --  8   GLUCOSE mg/dL 93 112*  --  105*               Results from last 7 days   Lab Units 11/14/23  0601 11/13/23  0508 11/12/23 0725 11/11/23  0623 11/10/23  1912   MAGNESIUM mg/dL 1.7 1.9 1.6   < >  --    PHOSPHORUS mg/dL 2.9 2.8 3.0   < >  --    TRIGLYCERIDES mg/dL  --   --   --   --  79   ALBUMIN g/dL 2.9* 3.1* 3.2*   < > 3.1*    < > = values in this interval not displayed.             No results found for: \"COVID19\"  No results found for: \"HGBA1C\"         Medications              Scheduled Medications diphenhydrAMINE, 25 mg, Intravenous, Q6H  enoxaparin, 1 mg/kg, Subcutaneous, Q12H  famotidine, 20 mg, Intravenous, BID  metoclopramide, 10 mg, Oral, TID AC  ondansetron, 4 mg, Intravenous, Q4H  pantoprazole, 40 mg, Intravenous, Q AM  potassium chloride, 10 mEq, Intravenous, Q1H  Scopolamine, 1 patch, Transdermal, Q72H  sodium chloride, 10 mL, Intravenous, Q12H  sodium chloride, 10 mL, Intravenous, Q12H  sodium chloride, 10 mL, Intravenous, Q12H  thiamine (B-1) 100 mg, folic acid 1 mg, potassium chloride 20 mEq in dextrose 5 % and sodium chloride 0.45 % 1,000 mL infusion, 999 mL/hr, Intravenous, Daily         Infusions dextrose, 75 mL/hr, Last " Rate: 75 mL/hr (11/14/23 0632)  dextrose 5 % and sodium chloride 0.45 %, 125 mL/hr, Last Rate: 125 mL/hr (11/11/23 1723)  Pharmacy to Dose TPN,          PRN Medications   calcium carbonate    Calcium Replacement - Follow Nurse / BPA Driven Protocol    chlorproMAZINE HCl (THORAZINE) 50 mg in sodium chloride 0.9 % 50 mL IVPB    cyclobenzaprine    doxylamine-pyridoxine 25-25 mg combo dose    famotidine    Magnesium Standard Dose Replacement - Follow Nurse / BPA Driven Protocol    Pharmacy to Dose TPN    phenol    Phosphorus Replacement - Follow Nurse / BPA Driven Protocol    Potassium Replacement - Follow Nurse / BPA Driven Protocol    Potassium Replacement - Follow Nurse / BPA Driven Protocol    promethazine    sodium chloride    sodium chloride    sodium chloride    sodium chloride      Physical Findings              General Findings alert, oriented, other: pregnant   Oral/Mouth Cavity other:sore throat   Edema  no edema   Gastrointestinal Nausea, vomiting, Last BM  11/12   Skin  skin intact   Tubes/Drains/Lines none   NFPE Not indicated at this time   --      Current Nutrition Orders & Evaluation of Intake         Oral Nutrition      Food Allergies NKFA   Current PO Diet Diet: Regular/House Diet; Texture: Regular Texture (IDDSI 7); Fluid Consistency: Thin (IDDSI 0)   Supplement Boost Breeze, TID   PO Evaluation      % PO Intake Very small amounts of po     Factors Affecting Intake: nausea, vomiting, weakness   --  PES STATEMENT / NUTRITION DIAGNOSIS        Nutrition Dx Problem  Problem: Inadequate Oral Intake  Etiology: Medical Diagnosis - Hyperemesis Gravidarum    Signs/Symptoms: PO Diet Not Tolerated      NUTRITION INTERVENTION / PLAN OF CARE        Intervention Goal(s) Reduce/improve symptoms, Disease management/therapy, Initiate feeding/diet, and Maintain weight            RD Intervention/Action Supplement provided, Encourage intake, Continue to monitor, Care plan reviewed, and Recommend/order: ONS   --         Prescription/Orders:        PO Diet        Supplements Boost Breeze TID, Boost Plus BID      Enteral Nutrition        Parenteral Nutrition     New Prescription Ordered? Yes            Monitor/Evaluation Per protocol, I&O, PO intake, Pertinent labs, Weight, GI status, Symptoms, POC/GOC   Discharge Plan/Needs Pending clinical course         RD to follow per protocol.  Electronically signed by:  Ileana Mclain RD  23 09:47 EST           Revision History        Cele Goldberg MD   Physician  Maternal-Fetal Medicine     Consults     Signed     Date of Service: 23 1026  Creation Time: 23 1026  Consult Orders   Inpatient Maternal & Fetal Medicine Consult [[id number]] ordered by Stephane Garza MD at 23 1009          Signed       Expand All Collapse All[]Expand All by Default    MATERNAL FETAL MEDICINE Consult Note     Dear Dr Yusuf Myrick MD:     Thank you for your kind referral of Osbaldo Mayo.  As you know, she is a 28 y.o.   at  11 2/7 weeks gestation (Estimated Date of Delivery: 24). This is a consult.       Her antepartum course is complicated by:  Hyperemesis gravidarum        HPI: Today, she denies headache, blurry vision, RUQ pain. No vaginal bleeding, no contractions.      Review of History:  Medical History        Past Medical History:   Diagnosis Date    Anemia      Heart murmur       as a child    Hypoglycemia      Uterine fibroids affecting pregnancy, antepartum           Surgical History         Past Surgical History:   Procedure Laterality Date    DILATATION AND CURETTAGE        WISDOM TOOTH EXTRACTION                Social History   Social History            Socioeconomic History    Marital status: Single   Tobacco Use    Smoking status: Former       Types: Cigars       Start date:        Quit date: 2023       Years since quittin.1    Smokeless tobacco: Never    Tobacco comments:       Smoke 2 cigars a day.   Vaping Use    Vaping Use: Never  used   Substance and Sexual Activity    Alcohol use: Not Currently    Drug use: Not Currently    Sexual activity: Defer               Family History   Problem Relation Age of Onset    Hypertension Mother      Diabetes Mother      No Known Problems Father      Asthma Sister      Asthma Brother      No Known Problems Maternal Aunt      No Known Problems Maternal Uncle      No Known Problems Paternal Aunt      No Known Problems Paternal Uncle      No Known Problems Maternal Grandmother      Cancer Maternal Grandfather      No Known Problems Paternal Grandmother      No Known Problems Paternal Grandfather      Cancer Other           Breat    Diabetes Other      Hypertension Other      Anesthesia problems Neg Hx      Broken bones Neg Hx      Clotting disorder Neg Hx      Collagen disease Neg Hx      Dislocations Neg Hx      Osteoporosis Neg Hx      Rheumatologic disease Neg Hx      Scoliosis Neg Hx      Severe sprains Neg Hx        No Known Allergies   No current facility-administered medications on file prior to encounter.             Current Outpatient Medications on File Prior to Encounter   Medication Sig Dispense Refill    doxylamine (UNISOM) 25 MG tablet Take 1 tablet by mouth At Night As Needed for Sleep.        doxylamine-pyridoxine ER (Bonjesta) 20-20 MG tablet controlled-release tablet Take 1 tablet by mouth Every Night. 60 tablet 0    ondansetron ODT (ZOFRAN-ODT) 8 MG disintegrating tablet Place 1 tablet on the tongue Every 8 (Eight) Hours As Needed for Nausea or Vomiting. 30 tablet 0         Past obstetric, gynecological, medical, surgical, family and social history reviewed.  Relevant lab work and imaging reviewed.     Review of systems  Constitutional:  denies fever, chills, malaise.   ENT/Mouth:  denies sore throat, tinnitus  Eyes: denies vision changes/pain  CV:  denies chest pain  Respiratory:  denies cough/SOB  GI:  denies N/V, diarrhea, abdominal pain.    :   denies dysuria  Skin:  denies lesions or  pruritus   Neuro:  denies weakness, focal neurologic symptoms     Vitals          Vitals:     23 0904 23 1756 23 2116 23 0517   BP: 114/69 121/75 114/74 98/59   BP Location: Right leg Right leg Right leg Right arm   Patient Position: Lying Lying Sitting Sitting   Pulse: 101 92 90 87   Resp: 18 18 16 15   Temp: 98.5 °F (36.9 °C) 98.6 °F (37 °C) 98.2 °F (36.8 °C) 97.8 °F (36.6 °C)   TempSrc: Oral Oral Oral Oral   SpO2: 100% 100% 100% 94%   Weight:       72.4 kg (159 lb 9.8 oz)   Height:                    PHYSICAL EXAM   GENERAL: Not in acute distress, AAOx3, pleasant  CARDIO: regular rate and rhythm  PULM: symmetric chest rise, speaking in complete sentences without difficulty  NEURO: awake, alert and oriented to person, place, and time  ABDOMINAL: No fundal tenderness, no rebound or guarding, gravid  EXTREMITIES: no bilateral lower extremity edema/tenderness  SKIN: Warm, well-perfused        ULTRASOUND   Please view full ultrasound note on Imaging tab in [employer].        ASSESSMENT/COUNSELIN y.o.   at  11 2/7 weeks gestation (Estimated Date of Delivery: 24).      -Pregnancy  [ X ] stable  [   ] improving [  ] worsening     Diagnoses and all orders for this visit:     1. Hyperemesis gravidarum (Primary)      Hyperemesis gravidarum/malnourishment, refractory:  S/p TPN, now has LUE DVT and SVT  This patient has significant hyperemesis gravidarum.  She is currently taking famotidine and zofran prn but has been intermittently eating meals. She is concerned now that the TPN has stopped she will revert back to significant N/V.   Patients who are refractory to all pharmacologic and nonpharmacologic interventions should be supported with tube feeding and IV fluids with thiamine.      TPN can be considered but has considerable risks and is therefore a last resort in patients taking in no fluid or solids for prolonged periods that have exhausted all other options.  In my opinion, TPN  should be avoided at all costs if at all possible due to significant risk of complications in pregnancy, especially if a trial of enteral tube feeding has not been tried--I would not restart TPN.  Possible complications associated with TPN include Dehydration and electrolyte Imbalances, thrombosis, hyperglycemia, hypoglycemia, infection, liver failure, micronutrient deficiencies, and later refeeding issues.      In general, tube feeding is begun in patients who cannot maintain their weight because of vomiting and despite a trial of the interventions described above. Enteral nutrition is preferable to the parenteral route and may relieve nausea and vomiting If tube feeding necessary, would continue until patient able to take in at least 1000 kcal/day PO.       Since she has been eating, I would hold off on enteral feeds for now       I reviewed the patient's medication list and have the following recommendations:  -Thorazine 50 mg IV BID.  Could transition to oral once eating appropriately.  I would continue this for at least 1-2 weeks until resolution of symptoms. We briefly discussed jitteriness/ sxs that can be associated with thorazine, so I would like to get her off before delivery, but if not, these are usually transient and self-limited and I think the benefit of her getting nutrition outweighs transient withdrawal if this ends of being the medication that helps her.  We will make this prn for patient.       -Scopolamine 1 mg patch q 72 hours--she can stay on this until delivery if she needs to.       -Banana bag daily     -Continue reglan, protonix, and aggressive electrolyte replacement     If these fail, consider steroids.  Ideally would do after 12 weeks, but benefits would outweigh risks of inconsistent association with cleft and heart defects if other antiemetics fail.    -Hydrocortisone 100 mg BID x2 doses--Then transition to daily prednisone 40 mg daily.  Once eating x 24-48 hours, would taper  to lowest effective dose.     Taper: 40 mg daily x 3 days past tolerating PO,  then 30 mg/day x 3 days, then 20 mg/day x 3 days, then 10 mg daily x 3 days, then 5 mg daily x 3 days.  Can then come off if continues to tolerate ween.  If at any point pt doesn't tolerate taper, go back to lowest effective dose.       For delivery,  I would recommend stress dose steroids if she remains >5 mg a day OR is on them for more than 3 weeks.     Potential regimen if stress dose indicated: Hydrocortisone 100 mg IV bolus then 50 mg q8h until delivered then back on normal dose.       -Consider boost shakes/nutrition supplementation if/when patient can tolerate.      -Would recommend an EKG given that she is on several qt prolonging medications.       -Recommend CTPA as pt is currently having chest pain--could be musculoskeletal related to vomiting, so ordered flexeril, but given clot needs to rule out PE.       Acute DVT and superficial venous thrombus:  For SVT, recommend warm compresses and tylenol prn.    Recommend Lovenox 1 mg/kg BID--this will need to be continued until 6-8 weeks postpartum.  Recommend nonurgent hematology consult to manage postpartum.        Summary of Plan  -Follow up MFM office in 3-4 weeks.   -Delivery 39 weeks unless indicated sooner   -See above for nutrition/medication recommendation details: in summary, start hydrocortisone, scopolamine, and thorazine and continue current antiemetics.    -EKG now  -Will follow along peripherally while inpatient and see every couple of days.       Thank you for the consult and opportunity to care for this patient.  Please feel free to reach out with any questions or concerns.       I spent 40 minutes caring for this patient on this date of service. This time includes time spent by me in the following activities: preparing for the visit, reviewing tests, obtaining and/or reviewing a separately obtained history, performing a medically appropriate examination and/or  evaluation, counseling and educating the patient/family/caregiver and independently interpreting results and communicating that information with the patient/family/caregiver with greater than 50% spent in counseling and coordination of care.      Cele Goldberg MD FACOG  Maternal Fetal Medicine[facility]  Office: [phone]  [email or web address]                                  Stephane Garza MD at 11/13/23 1055          OB  PROGRESS NOTES    Patient Name: Osbaldo Mayo  YOB: 1995  MRN: [id number]  Admission Date: 11/3/2023  7:31 AM  Date of Service: 11/13/2023      S:  Patient informed  of results of Left upper extremity doppler US, demonstrating axillary DVT.  Acute left upper extremity superficial thrombophlebitis noted in the basilic (upper arm) and cephalic (forearm).       O:    Patient Active Problem List    Diagnosis     *Hyperemesis gravidarum [O21.0]     Pregnancy [Z34.90]            Vitals:    11/12/23 1756 11/12/23 2116 11/13/23 0517 11/13/23 1100   BP: 121/75 114/74 98/59 110/71   BP Location: Right leg Right leg Right arm Right arm   Patient Position: Lying Sitting Sitting Lying   Pulse: 92 90 87 99   Resp: 18 16 15 18   Temp: 98.6 °F (37 °C) 98.2 °F (36.8 °C) 97.8 °F (36.6 °C) 98.1 °F (36.7 °C)   TempSrc: Oral Oral Oral Oral   SpO2: 100% 100% 94% 100%   Weight:   72.4 kg (159 lb 9.8 oz)    Height:             Scheduled Medications:    diphenhydrAMINE, 25 mg, Intravenous, Q6H  enoxaparin, 1 mg/kg, Subcutaneous, Q12H  famotidine, 20 mg, Intravenous, BID  Fat Emulsion Plant Based (Soy), 100 mL, Intravenous, Q24H (TPN)  ondansetron, 8 mg, Intravenous, Q8H  pantoprazole, 40 mg, Intravenous, Q AM  promethazine, 12.5 mg, Intravenous, Q4H  sodium chloride, 10 mL, Intravenous, Q12H  sodium chloride, 10 mL, Intravenous, Q12H  sodium chloride, 10 mL, Intravenous, Q12H            Assessment:  Dr Cele Goldberg MD MFM was called and consulted regarding findings  of DVT and superficial thrombosis of left upper extremity PICC line.  She recommended removal of PICC line and to begin therapeutic Lovenox at 1 mg/KG q 12 hours. Dr Goldberg will see the patient and make further recommendations.        Plan/Recommendations:    Remove PICC line  Begin Lovenox 1 mg per KG q 12 hours.  Await [facility] recommendations      Stephane Garza MD  2023  10:55 EST  OB Hospitalist  Phone   [phone]    Electronically signed by Stephane Garza MD at 23 1103       Stephane Garza MD at 23 1009          [facility]  Osbaldo Mayo  : 1995  MRN: [id number]  CSN: [id number]    Hospital Day: 11        Antepartum Progress Note    Subjective       Osbaldo Mayo is a 28 y.o. female  at 11w2d.  Patient is receiving TPN through a PICC line in the left upper arm, in general she feels better and has been able to tolerate some p.o. intake.  However, she is developed a sensation of feeling the PICC line in her left upper shoulder and a pressure feeling in the chest.  She does not feel shortness of breath.  Chest x-ray yesterday was unremarkable with demonstration of the tip of the SVC and the appropriate place.      Review of Systems   Constitutional:  Negative for chills, fatigue and fever.   HENT: Negative.     Eyes:  Negative for photophobia and visual disturbance.   Respiratory:  Negative for cough, chest tightness and shortness of breath.    Cardiovascular:  Positive for chest pain (Mild chest discomfort). Negative for leg swelling.   Gastrointestinal:  Positive for nausea (But decreased from admission). Negative for abdominal pain, diarrhea and vomiting.   Genitourinary:  Negative for dysuria, flank pain, hematuria, pelvic pain, vaginal bleeding and vaginal discharge.   Musculoskeletal:  Negative for back pain.        Left upper shoulder discomfort   Neurological:  Negative for dizziness, seizures, weakness and headaches.         Objective      Min/max vitals past 24 hours:   Temp  Min: 97.8 °F (36.6 °C)  Max: 98.6 °F (37 °C)  BP  Min: 98/59  Max: 121/75  Pulse  Min: 87  Max: 92  Resp  Min: 15  Max: 18               General: No acute distress   Heart: Not performed.   Lungs: breathing is unlabored  clear to auscultation bilaterally   Abdomen: soft, non-tender; no masses       Cervix: was not checked.   Contractions: none          Recent Results (from the past 24 hour(s))   Potassium    Collection Time: 11/12/23 10:31 PM    Specimen: Blood   Result Value Ref Range    Potassium 3.7 3.5 - 5.2 mmol/L   Comprehensive Metabolic Panel    Collection Time: 11/13/23  5:08 AM    Specimen: Blood   Result Value Ref Range    Glucose 112 (H) 65 - 99 mg/dL    BUN 7 6 - 20 mg/dL    Creatinine 0.38 (L) 0.57 - 1.00 mg/dL    Sodium 131 (L) 136 - 145 mmol/L    Potassium 4.3 3.5 - 5.2 mmol/L    Chloride 104 98 - 107 mmol/L    CO2 18.0 (L) 22.0 - 29.0 mmol/L    Calcium 8.7 8.6 - 10.5 mg/dL    Total Protein 5.8 (L) 6.0 - 8.5 g/dL    Albumin 3.1 (L) 3.5 - 5.2 g/dL    ALT (SGPT) 9 1 - 33 U/L    AST (SGOT) 11 1 - 32 U/L    Alkaline Phosphatase 68 39 - 117 U/L    Total Bilirubin <0.2 0.0 - 1.2 mg/dL    Globulin 2.7 gm/dL    A/G Ratio 1.1 g/dL    BUN/Creatinine Ratio 18.4 7.0 - 25.0    Anion Gap 9.0 5.0 - 15.0 mmol/L    eGFR 138.4 >60.0 mL/min/1.73   Magnesium    Collection Time: 11/13/23  5:08 AM    Specimen: Blood   Result Value Ref Range    Magnesium 1.9 1.6 - 2.6 mg/dL   Phosphorus    Collection Time: 11/13/23  5:08 AM    Specimen: Blood   Result Value Ref Range    Phosphorus 2.8 2.5 - 4.5 mg/dL         11/06/23  1800 11/12/23  0524 11/13/23  0517   Weight: 76.3 kg (168 lb 3.2 oz) 71.6 kg (157 lb 13.6 oz) 72.4 kg (159 lb 9.8 oz)           Assessment   IUP at 11w2d  Hyperemesis gravidarum currently on TPN through a PICC line in the left upper arm, now with swelling in the left upper arm and complaints of chest discomfort.  Doppler of the left upper arm has been ordered.   Following results of Doppler we will make management decision regarding removal versus continued use of the PICC line.  Hypokalemia is improved  Patient does have mild hyponatremia with sodium 131.  She has demonstrated a mild weight gain improving from 71.6 kg to 72.4 kg  In view of the fact the patient is now on TPN have requested MFM consult for advice and management    Plan   Doppler study of left upper arm has been ordered in view of patient's complaint of discomfort at the PICC line site and chest discomfort  We will await recommendations of MFM  We will continue to monitor electrolytes while patient is on TPN and until stable  We will plan for discharge once patient is able to be stable without loss of electrolytes and is tolerating p.o. foods.  Patient is not stable for discharge at this time    Stephane Garza MD  2023  10:10 EST  OB Hospitalist  Phone:  [phone]    Electronically signed by Stephane Garza MD at 23 1021              Discharge Summary        Stephane Garza MD at 11/15/23 1326            Discharge Summary    [facility]   Osbaldo Mayo  : 1995  MRN: [id number]  CSN: [id number]        Date of Admission and Time: 11/3/2023  7:31 AM    Date of Discharge and Time:  11/15/23                    Admission Diagnosis: Hyperemesis gravidarum [O21.0]  Pregnancy [Z34.90]  hypokalemia     Discharge Diagnosis: Hyperemesis gravidarum [O21.0]  Pregnancy [Z34.90]  Left upper arm DVT after PICC line placement for TPN currently on lovenox  Resolution of hypokalemia  Now at 11 weeks 4 days               Presenting Problem/History of Present Illness  Hyperemesis gravidarum [O21.0]  Pregnancy [Z34.90]     Patient Active Problem List   Diagnosis    Hyperemesis gravidarum    Pregnancy         Hospital Course  Patient is a 28 y.o. female  at 11w4d  today who was admitted on 11/3/23 due to nausea and vomiting which was c/w hyperemesis gravidarum which was severe  leading to hypokalemia.  She was placed on zofran, benadryl, pepcid, protonix and did not improve and on Friday Nov 10 she was started on TPN and a PICC line was placed.  She developed pain in the left upper shoulder and doppler US was obtained which revealed left upper extremity DVT on 11/13 and she was placed on therapeutic Lovenox at 1 mg/kg as per direction of Cele Goldberg who had seen the patient on 11/13/23 please see her note for details.    Patient improved on current anti-emetic regimen: zofran, scopolamine, thorazine, pepcid and protonix. K+ repletion protocol enacted.   Today she was able to tolerate her diet orally and stated she was ready for discharge.  She will be discharged to home and will follow up with Davin Whitaker MD at [facility] early next week, case was discussed with Dr Sherman MILLER who states he will accept the patient.          Condition on Discharge:  Stable    Vital Signs  Temp:  [97.4 °F (36.3 °C)-99 °F (37.2 °C)] 97.4 °F (36.3 °C)  Heart Rate:  [] 100  Resp:  [16] 16  BP: (106-126)/(64-74) 126/72    Physical Exam on Discharge        General:    well developed; well nourished  no acute distress         Heart:     Not performed.         Head:     normocephalic        Lungs:     breathing is unlabored         Back:       CVA tenderness is absent, Low back pain absent  Abdomen:     soft, non-tender; no masses  no umbilical or inguinal hernias are present  no hepato-splenomegaly       FHT's:      150 BPM      Cervix:      was not checked.                           Contractions:      none            Extremities:       peripheral pulses normal, no pedal edema, no clubbing or cyanosis            Recent Results (from the past 72 hour(s))   Potassium    Collection Time: 11/12/23 10:31 PM    Specimen: Blood   Result Value Ref Range    Potassium 3.7 3.5 - 5.2 mmol/L   Comprehensive Metabolic Panel    Collection Time: 11/13/23  5:08 AM    Specimen: Blood   Result Value Ref Range     Glucose 112 (H) 65 - 99 mg/dL    BUN 7 6 - 20 mg/dL    Creatinine 0.38 (L) 0.57 - 1.00 mg/dL    Sodium 131 (L) 136 - 145 mmol/L    Potassium 4.3 3.5 - 5.2 mmol/L    Chloride 104 98 - 107 mmol/L    CO2 18.0 (L) 22.0 - 29.0 mmol/L    Calcium 8.7 8.6 - 10.5 mg/dL    Total Protein 5.8 (L) 6.0 - 8.5 g/dL    Albumin 3.1 (L) 3.5 - 5.2 g/dL    ALT (SGPT) 9 1 - 33 U/L    AST (SGOT) 11 1 - 32 U/L    Alkaline Phosphatase 68 39 - 117 U/L    Total Bilirubin <0.2 0.0 - 1.2 mg/dL    Globulin 2.7 gm/dL    A/G Ratio 1.1 g/dL    BUN/Creatinine Ratio 18.4 7.0 - 25.0    Anion Gap 9.0 5.0 - 15.0 mmol/L    eGFR 138.4 >60.0 mL/min/1.73   Magnesium    Collection Time: 11/13/23  5:08 AM    Specimen: Blood   Result Value Ref Range    Magnesium 1.9 1.6 - 2.6 mg/dL   Phosphorus    Collection Time: 11/13/23  5:08 AM    Specimen: Blood   Result Value Ref Range    Phosphorus 2.8 2.5 - 4.5 mg/dL   Duplex Venous Upper Extremity - Left CAR    Collection Time: 11/13/23 10:18 AM   Result Value Ref Range    Left Axillary Color 1.0     Left Basilic Upper Color 1.0     Left Cephalic Forearm Color 1.0     Right Internal Jugular Spont Y     Right Internal Jugular Phasic Y     Right Internal Jugular Compress C     Right Internal Jugular Augment Y     Right Subclavian Spont Y     Right Subclavian Phasic Y     Right Subclavian Compress C     Right Subclavian Augment Y     Left Internal Jugular Spont Y     Left Internal Jugular Phasic Y     Left Internal Jugular Compress C     Left Internal Jugular Augment Y     Left Subclavian Spont Y     Left Subclavian Phasic Y     Left Subclavian Compress C     Left Subclavian Augment Y     Left Axillary Spont N     Left Axillary Phasic N     Left Axillary Compress P     Left Axillary Augment D     Left Axillary Thrombus A     Left Brachial Compress C     Left Radial Compress C     Left Ulnar Compress C     Left Basilic Upper Compress N     Left Basilic Upper Thrombus A     Left Basilic Forearm Compress C     Left Cephalic  Upper Compress C     Left Cephalic Forearm Compress N     Left Cephalic Forearm Thrombus A     BH CV VAS PRELIMINARY FINDINGS SCRIPTING 1.0    ECG 12 Lead Chest Pain    Collection Time: 11/13/23  6:53 PM   Result Value Ref Range    QT Interval 330 ms    QTC Interval 415 ms   Comprehensive Metabolic Panel    Collection Time: 11/14/23  6:01 AM    Specimen: Blood   Result Value Ref Range    Glucose 93 65 - 99 mg/dL    BUN 4 (L) 6 - 20 mg/dL    Creatinine 0.38 (L) 0.57 - 1.00 mg/dL    Sodium 133 (L) 136 - 145 mmol/L    Potassium 3.6 3.5 - 5.2 mmol/L    Chloride 104 98 - 107 mmol/L    CO2 21.3 (L) 22.0 - 29.0 mmol/L    Calcium 8.7 8.6 - 10.5 mg/dL    Total Protein 6.0 6.0 - 8.5 g/dL    Albumin 2.9 (L) 3.5 - 5.2 g/dL    ALT (SGPT) 6 1 - 33 U/L    AST (SGOT) 8 1 - 32 U/L    Alkaline Phosphatase 72 39 - 117 U/L    Total Bilirubin <0.2 0.0 - 1.2 mg/dL    Globulin 3.1 gm/dL    A/G Ratio 0.9 g/dL    BUN/Creatinine Ratio 10.5 7.0 - 25.0    Anion Gap 7.7 5.0 - 15.0 mmol/L    eGFR 138.4 >60.0 mL/min/1.73   Magnesium    Collection Time: 11/14/23  6:01 AM    Specimen: Blood   Result Value Ref Range    Magnesium 1.7 1.6 - 2.6 mg/dL   Phosphorus    Collection Time: 11/14/23  6:01 AM    Specimen: Blood   Result Value Ref Range    Phosphorus 2.9 2.5 - 4.5 mg/dL   Potassium    Collection Time: 11/14/23  7:07 PM    Specimen: Blood   Result Value Ref Range    Potassium 3.6 3.5 - 5.2 mmol/L   Comprehensive Metabolic Panel    Collection Time: 11/15/23  5:35 AM    Specimen: Blood   Result Value Ref Range    Glucose 94 65 - 99 mg/dL    BUN 5 (L) 6 - 20 mg/dL    Creatinine 0.48 (L) 0.57 - 1.00 mg/dL    Sodium 134 (L) 136 - 145 mmol/L    Potassium 4.1 3.5 - 5.2 mmol/L    Chloride 104 98 - 107 mmol/L    CO2 22.2 22.0 - 29.0 mmol/L    Calcium 9.0 8.6 - 10.5 mg/dL    Total Protein 5.8 (L) 6.0 - 8.5 g/dL    Albumin 2.9 (L) 3.5 - 5.2 g/dL    ALT (SGPT) 8 1 - 33 U/L    AST (SGOT) 9 1 - 32 U/L    Alkaline Phosphatase 76 39 - 117 U/L    Total Bilirubin  <0.2 0.0 - 1.2 mg/dL    Globulin 2.9 gm/dL    A/G Ratio 1.0 g/dL    BUN/Creatinine Ratio 10.4 7.0 - 25.0    Anion Gap 7.8 5.0 - 15.0 mmol/L    eGFR 130.9 >60.0 mL/min/1.73   Magnesium    Collection Time: 11/15/23  5:35 AM    Specimen: Blood   Result Value Ref Range    Magnesium 1.6 1.6 - 2.6 mg/dL   Phosphorus    Collection Time: 11/15/23  5:35 AM    Specimen: Blood   Result Value Ref Range    Phosphorus 2.7 2.5 - 4.5 mg/dL   Potassium    Collection Time: 11/15/23  7:41 AM    Specimen: Blood   Result Value Ref Range    Potassium 3.8 3.5 - 5.2 mmol/L             Discharge Disposition  Home or Self Care    Discharge Medications     Discharge Medications        New Medications        Instructions Start Date   chlorproMAZINE 50 MG tablet  Commonly known as: THORAZINE   50 mg, Oral, 2 Times Daily      Enoxaparin Sodium 80 MG/0.8ML solution prefilled syringe syringe  Commonly known as: LOVENOX   Discard 0.1mL and Inject 0.7 mL under the skin into the appropriate area as directed Every 12 (Twelve) Hours for 15 days. Indications: DVT/PE (active thrombosis)   Start Date: November 16, 2023     pantoprazole 40 MG EC tablet  Commonly known as: PROTONIX   40 mg, Oral, Daily      Scopolamine 1 MG/3DAYS patch   1 patch, Transdermal, Every 72 Hours   Start Date: November 16, 2023            Changes to Medications        Instructions Start Date   ondansetron ODT 4 MG disintegrating tablet  Commonly known as: ZOFRAN-ODT  What changed:   medication strength  how much to take  when to take this   4 mg, Translingual, Every 4 Hours PRN             Stop These Medications      Bonjesta 20-20 MG tablet controlled-release tablet  Generic drug: doxylamine-pyridoxine ER     doxylamine 25 MG tablet  Commonly known as: UNISOM              Discharge Diet: Regular    Activity at Discharge:   Activity Instructions       Activity as Tolerated              Follow-up Appointments  No future appointments.  Additional Instructions for the Follow-ups  that You Need to Schedule       Discharge Follow-up with Specified Provider: Davin Whitaker MD; 5 Days   As directed      To: Davin Whitaker MD   Follow Up: 5 Days   Follow Up Details: follow up early next week with Davin Whitaker MD [facility]  [phone]                      Stephane Garza MD  11/15/23  14:44 EST              Electronically signed by Stephane Garza MD at 11/15/23 6568

## 2023-11-17 ENCOUNTER — INITIAL PRENATAL (OUTPATIENT)
Dept: OBSTETRICS AND GYNECOLOGY | Facility: CLINIC | Age: 28
End: 2023-11-17
Payer: COMMERCIAL

## 2023-11-17 VITALS — SYSTOLIC BLOOD PRESSURE: 104 MMHG | DIASTOLIC BLOOD PRESSURE: 74 MMHG | BODY MASS INDEX: 24.33 KG/M2 | WEIGHT: 160 LBS

## 2023-11-17 DIAGNOSIS — Z3A.11 11 WEEKS GESTATION OF PREGNANCY: ICD-10-CM

## 2023-11-17 DIAGNOSIS — Z34.81 MULTIGRAVIDA IN FIRST TRIMESTER: Primary | ICD-10-CM

## 2023-11-17 DIAGNOSIS — O21.9 NAUSEA AND VOMITING DURING PREGNANCY: ICD-10-CM

## 2023-11-17 NOTE — PROGRESS NOTES
Cc:  First visit for new pregnancy.  Patient was not on birth control nor was she trying to conceive.  She delivered earlier this year.  She had been hospitalized in [facility], [place], operated by [facility] for the past 2 weeks for hyperemesis, with treatment including deep intravenous line with TPN. She is on home medications and has decreased symptoms.  Past medical, surgical, obstetric, genetic and social histories reviewed.  Allergies and medications reviewed.  10 Point ROS reviewed and all pertinent negative.  Physical exam documented in chart.  Prenatal labs ordered  Reviewed notes from hospitalization from previous care team.  A/P:  IUP at 11 weeks with hyperemesis gravidarum, short interval between pregnancy  - Patient improved and likely has resolved hyperemesis.  Treat with oral/outpatient treatments.    - Short interval.  Discussed optimizing health of pregnancy.  Emphasized importance of not smoking, using THC, taking vitamins and vaccinations.

## 2023-11-18 LAB
ABO GROUP BLD: ABNORMAL
AMPHETAMINES UR QL SCN: NEGATIVE NG/ML
BARBITURATES UR QL SCN: NEGATIVE NG/ML
BASOPHILS # BLD AUTO: 0 X10E3/UL (ref 0–0.2)
BASOPHILS NFR BLD AUTO: 0 %
BENZODIAZ UR QL: NEGATIVE NG/ML
BLD GP AB SCN SERPL QL: NEGATIVE
BZE UR QL: NEGATIVE NG/ML
CANNABINOIDS UR QL SCN: NEGATIVE NG/ML
EOSINOPHIL # BLD AUTO: 0.1 X10E3/UL (ref 0–0.4)
EOSINOPHIL NFR BLD AUTO: 1 %
ERYTHROCYTE [DISTWIDTH] IN BLOOD BY AUTOMATED COUNT: 14.3 % (ref 11.7–15.4)
HBV SURFACE AG SERPL QL IA: NEGATIVE
HCT VFR BLD AUTO: 36.4 % (ref 34–46.6)
HCV IGG SERPL QL IA: NON REACTIVE
HGB BLD-MCNC: 11.4 G/DL (ref 11.1–15.9)
HIV 1+2 AB+HIV1 P24 AG SERPL QL IA: NON REACTIVE
IMM GRANULOCYTES # BLD AUTO: 0 X10E3/UL (ref 0–0.1)
IMM GRANULOCYTES NFR BLD AUTO: 0 %
LYMPHOCYTES # BLD AUTO: 1.3 X10E3/UL (ref 0.7–3.1)
LYMPHOCYTES NFR BLD AUTO: 32 %
MCH RBC QN AUTO: 25.5 PG (ref 26.6–33)
MCHC RBC AUTO-ENTMCNC: 31.3 G/DL (ref 31.5–35.7)
MCV RBC AUTO: 81 FL (ref 79–97)
METHADONE UR QL SCN: NEGATIVE NG/ML
MONOCYTES # BLD AUTO: 0.3 X10E3/UL (ref 0.1–0.9)
MONOCYTES NFR BLD AUTO: 7 %
NEUTROPHILS # BLD AUTO: 2.5 X10E3/UL (ref 1.4–7)
NEUTROPHILS NFR BLD AUTO: 60 %
OPIATES UR QL: NEGATIVE NG/ML
PCP UR QL SCN: NEGATIVE NG/ML
PLATELET # BLD AUTO: 240 X10E3/UL (ref 150–450)
PROPOXYPH UR QL SCN: NEGATIVE NG/ML
RBC # BLD AUTO: 4.47 X10E6/UL (ref 3.77–5.28)
RH BLD: NEGATIVE
RPR SER QL: NON REACTIVE
RUBV IGG SERPL IA-ACNC: 1.47 INDEX
WBC # BLD AUTO: 4.2 X10E3/UL (ref 3.4–10.8)

## 2023-11-19 LAB
BACTERIA UR CULT: NORMAL
BACTERIA UR CULT: NORMAL

## 2023-11-20 LAB
HGB A MFR BLD ELPH: 97.4 % (ref 96.4–98.8)
HGB A2 MFR BLD ELPH: 2.6 % (ref 1.8–3.2)
HGB F MFR BLD ELPH: 0 % (ref 0–2)
HGB FRACT BLD-IMP: NORMAL
HGB S MFR BLD ELPH: 0 %

## 2023-11-21 LAB
A VAGINAE DNA VAG QL NAA+PROBE: NORMAL SCORE
BVAB2 DNA VAG QL NAA+PROBE: NORMAL SCORE
C ALBICANS DNA VAG QL NAA+PROBE: NEGATIVE
C GLABRATA DNA VAG QL NAA+PROBE: NEGATIVE
C TRACH DNA VAG QL NAA+PROBE: NEGATIVE
MEGA1 DNA VAG QL NAA+PROBE: NORMAL SCORE
N GONORRHOEA DNA VAG QL NAA+PROBE: NEGATIVE
T VAGINALIS DNA VAG QL NAA+PROBE: NEGATIVE

## 2023-11-22 LAB
CYTOLOGIST CVX/VAG CYTO: NORMAL
CYTOLOGY CVX/VAG DOC CYTO: NORMAL
CYTOLOGY CVX/VAG DOC THIN PREP: NORMAL
DX ICD CODE: NORMAL
HIV 1 & 2 AB SER-IMP: NORMAL
HPV I/H RISK 4 DNA CVX QL PROBE+SIG AMP: NEGATIVE
OTHER STN SPEC: NORMAL
STAT OF ADQ CVX/VAG CYTO-IMP: NORMAL

## 2023-11-24 LAB
CFDNA.FET/CFDNA.TOTAL SFR FETUS: NORMAL %
CITATION REF LAB TEST: NORMAL
FET 13+18+21+X+Y ANEUP PLAS.CFDNA: NEGATIVE
FET CHR 21 TS PLAS.CFDNA QL: NEGATIVE
FET SEX PLAS.CFDNA DOSAGE CFDNA: NORMAL
FET TS 13 RISK PLAS.CFDNA QL: NEGATIVE
FET TS 18 RISK WBC.DNA+CFDNA QL: NEGATIVE
GA EST FROM CONCEPTION DATE: NORMAL D
GESTATIONAL AGE > 9:: YES
LAB DIRECTOR NAME PROVIDER: NORMAL
LAB DIRECTOR NAME PROVIDER: NORMAL
LABORATORY COMMENT REPORT: NORMAL
LIMITATIONS OF THE TEST: NORMAL
NEGATIVE PREDICTIVE VALUE: NORMAL
NOTE: NORMAL
PERFORMANCE CHARACTERISTICS: NORMAL
POSITIVE PREDICTIVE VALUE: NORMAL
REF LAB TEST METHOD: NORMAL
TEST PERFORMANCE INFO SPEC: NORMAL

## 2023-11-25 ENCOUNTER — TELEPHONE (OUTPATIENT)
Dept: OBSTETRICS AND GYNECOLOGY | Facility: CLINIC | Age: 28
End: 2023-11-25
Payer: COMMERCIAL

## 2023-11-25 ENCOUNTER — HOSPITAL ENCOUNTER (EMERGENCY)
Facility: HOSPITAL | Age: 28
Discharge: HOME | End: 2023-11-25
Attending: EMERGENCY MEDICINE
Payer: COMMERCIAL

## 2023-11-25 VITALS
BODY MASS INDEX: 24.25 KG/M2 | TEMPERATURE: 98.8 F | WEIGHT: 160 LBS | RESPIRATION RATE: 16 BRPM | DIASTOLIC BLOOD PRESSURE: 66 MMHG | HEIGHT: 68 IN | HEART RATE: 110 BPM | SYSTOLIC BLOOD PRESSURE: 110 MMHG | OXYGEN SATURATION: 98 %

## 2023-11-25 DIAGNOSIS — Z79.01 CHRONIC ANTICOAGULATION: ICD-10-CM

## 2023-11-25 DIAGNOSIS — I80.8 SUPERFICIAL THROMBOPHLEBITIS OF RIGHT UPPER EXTREMITY: Primary | ICD-10-CM

## 2023-11-25 PROCEDURE — 99282 EMERGENCY DEPT VISIT SF MDM: CPT

## 2023-11-26 ENCOUNTER — TELEPHONE (OUTPATIENT)
Dept: OBSTETRICS AND GYNECOLOGY | Facility: CLINIC | Age: 28
End: 2023-11-26
Payer: COMMERCIAL

## 2023-11-26 NOTE — TELEPHONE ENCOUNTER
[place]    Let her know that her genetic testing was normal/negative for Down Syndrome.    If she wants to know gender, she is having a boy.    Thanks    Sherman

## 2023-11-26 NOTE — ED TRIAGE NOTES
Pt ambulatory to triage from home with c/o right arm pain - pt has known dvt in that arm from previous dvt - told by ob to come here for further evaluation.  Pt denies any problems with pregnancy (except previous hyperemesis), denies cp or soa, only c/o right arm pain, especially when she touches the bruise that has formed on her arm.

## 2023-11-26 NOTE — ED TRIAGE NOTES
Pt is pregnant, wheels to 13 weeks 0 days, ob is lebder, [id number]. Known dvt in right arm from previous dvt, is currently on lovenox.

## 2023-11-26 NOTE — ED PROVIDER NOTES
EMERGENCY DEPARTMENT ENCOUNTER    Room Number:  34/34  Date of encounter:  11/25/2023  PCP: Provider, No Known  Historian: Patient    Patient was placed in face mask during triage process. Patient was wearing facemask when I entered the room and throughout our encounter. I wore full protective equipment throughout this patient encounter including a face mask, eye protection, and gloves. Hand hygiene was performed before donning protective equipment and again following doffing of PPE after leaving the room.    HPI:  Chief Complaint: Tender area right forearm  A complete HPI/ROS/PMH/PSH/SH/FH are unobtainable due to: N/A   Context: Osbaldo Mayo is a 28 y.o. female who presents to the ED c/o tender area right forearm that developed in the last 24 hours.  Patient notes that she was recently hospitalized but has been out for about 10 days.  She was treated for hyperemesis gravidarum which is doing much better.  She had a PICC in her left upper extremity during her time here but ultimately developed a DVT and thus it was removed.  She had multiple IVs during her stay in both upper extremities.  In the last 24 hours she has noticed hard slightly tender area left forearm that tracks along the vein.  There is a small bruise associated with it.  She is unaware of any acute trauma and has not noticed any lumps or fever from the area.  She denies any generalized fevers, chest pain or shortness of breath.  No other complaints at this time.  Patient is on Lovenox injections due to left upper extremity DVT.      MEDICAL HISTORY REVIEW  Additional sources:  - Discussed/ obtained information from independent historians:      - External (non-ED) record review:   Recent hospitalization for hyperemesis gravidarum requiring multiple IVs and left sided PICC.  Hospitalization reviewed.    - Chronic or social conditions impacting care: Pregnancy      PAST MEDICAL HISTORY  Active Ambulatory Problems     Diagnosis Date Noted     Hyperemesis gravidarum 10/16/2023    Pregnancy 2023     Resolved Ambulatory Problems     Diagnosis Date Noted    No Resolved Ambulatory Problems     Past Medical History:   Diagnosis Date    Anemia     Chlamydia     Heart murmur     Hypoglycemia     Uterine fibroids affecting pregnancy, antepartum          PAST SURGICAL HISTORY  Past Surgical History:   Procedure Laterality Date    DILATATION AND CURETTAGE      WISDOM TOOTH EXTRACTION           FAMILY HISTORY  Family History   Problem Relation Age of Onset    Hypertension Mother     Diabetes Mother     No Known Problems Father     Asthma Sister     No Known Problems Sister     No Known Problems Sister     No Known Problems Sister     Asthma Brother     No Known Problems Brother     No Known Problems Maternal Grandmother     Cancer Maternal Grandfather     Breast cancer Paternal Grandmother     No Known Problems Paternal Grandfather     Cancer Other         Breat    Diabetes Other     Hypertension Other     Anesthesia problems Neg Hx     Broken bones Neg Hx     Clotting disorder Neg Hx     Collagen disease Neg Hx     Dislocations Neg Hx     Osteoporosis Neg Hx     Rheumatologic disease Neg Hx     Scoliosis Neg Hx     Severe sprains Neg Hx          SOCIAL HISTORY  Social History     Socioeconomic History    Marital status: Single   Tobacco Use    Smoking status: Former     Types: Cigars     Start date:      Quit date: 2023     Years since quittin.1    Smokeless tobacco: Never    Tobacco comments:     Smoke 2 cigars a day.   Vaping Use    Vaping Use: Never used   Substance and Sexual Activity    Alcohol use: Not Currently    Drug use: Not Currently    Sexual activity: Yes     Birth control/protection: None         ALLERGIES  Patient has no known allergies.        REVIEW OF SYSTEMS  Review of Systems     All systems reviewed and negative except for those discussed in HPI.       PHYSICAL EXAM    I have reviewed the triage vital signs and nursing  notes.    ED Triage Vitals   Temp Heart Rate Resp BP SpO2   11/25/23 2042 11/25/23 2042 11/25/23 2042 11/25/23 2047 11/25/23 2042   98.8 °F (37.1 °C) 110 16 122/73 98 %      Temp src Heart Rate Source Patient Position BP Location FiO2 (%)   11/25/23 2042 11/25/23 2042 11/25/23 2047 11/25/23 2047 --   Tympanic Monitor Sitting Left arm        Physical Exam    Physical Exam   Constitutional: No distress.  Very pleasant and nontoxic  HENT:  Head: Normocephalic and atraumatic.   Oropharynx: Mucous membranes are moist.   Eyes: No scleral icterus.   Neck: Neck supple.   Cardiovascular: Pink, warm and well-perfused throughout  Pulmonary/Chest: No respiratory distress.  No tachypnea or increased work of breathing.  Speaks in full sentences.  Musculoskeletal: Moves all extremities equally.    Right forearm: There is a small ecchymosis in the mid forearm on the volar surface with about 5 cm of palpable lumpy hard vein distal to this with no surrounding cellulitic change.  Neurological: Alert.  Speech fluent and easily intelligible  Skin: Skin is pink, warm, and dry. No pallor.   Psychiatric: Mood and affect normal.   Nursing note and vitals reviewed.    LAB RESULTS  No results found for this or any previous visit (from the past 24 hour(s)).    Ordered the above labs and independently reviewed the results.        RADIOLOGY  No Radiology Exams Resulted Within Past 24 Hours    I ordered the above noted radiological studies. Reviewed by me and discussed with radiologist.  See dictation for official radiology interpretation.      PROCEDURES    Procedures        MEDICATIONS GIVEN IN ER    Medications - No data to display      PROGRESS, DATA ANALYSIS, CONSULTS, AND MEDICAL DECISION MAKING    My differential diagnosis of the upper extremity pain or injury includes but is not limited to contusions of the shoulder, forearm, arm, wrist, elbow or hand, dislocations of shoulder, elbow, wrist, digits, nursemaid's elbow (age-appropriate),  shoulder sprain, elbow sprain, wrist sprain, digit sprain, shoulder strain, arm strain, forearm strain, elbow strain, wrist strain, hand sprain, digit strain, lacerations of the upper extremity, fractures both closed and open of clavicle, scapula, humerus, radius, ulna, bones of the wrist, hands and digits, cellulitis or abscess, cervical radiculopathy, radial nerve palsy, neurogenic upper extremity pain, angina equivalent, SVT, DVT, arterial occlusion, compartment syndrome.      All labs have been independently reviewed by me.  All radiology studies have been reviewed by me and discussed with radiologist dictating the report.   EKG's independently viewed and interpreted by me.  Discussion below represents my analysis of pertinent findings related to patient's condition, differential diagnosis, treatment plan and final disposition.      ED Course as of 11/25/23 2124   Sat Nov 25, 2023 2123 CONSULT        Provider: Dr. Sandra - OB    Discussion: Reviewed patient history, ED presentation and evaluation.  No infectious process appreciated on my physical exam.  He feels comfortable giving the patient strict RT ER instructions for any changes concerning for cellulitis and otherwise warm compresses and outpatient follow-up.    Agreeable c treatment and planned disposition.         [RS]   2124 Reviewed all findings with patient.  We discussed at length changes that might be concerning for infectious process/cellulitis and need to return or follow-up closely with OB/GYN.  Patient agreeable with discharge planning at this time after reassurance. [RS]      ED Course User Index  [RS] Tico Hernandez MD       AS OF 21:24 EST VITALS:    BP - 122/73  HR - 110  TEMP - 98.8 °F (37.1 °C) (Tympanic)  O2 SATS - 98%        DIAGNOSIS  Final diagnoses:   Superficial thrombophlebitis of right upper extremity   Chronic anticoagulation         DISPOSITION  DISCHARGE    Patient discharged in stable condition.    Reviewed implications of  results, diagnosis, meds, responsibility to follow up, warning signs and symptoms of possible worsening, potential complications and reasons to return to ER.    Patient/Family voiced understanding of above instructions.    Discussed plan for discharge, as there is no emergent indication for admission. Patient referred to primary care provider for regular health maintenance. Pt/family is agreeable and understands need for follow up and possible repeat testing.  Pt is aware that discharge does not mean that nothing is wrong but it indicates no emergency is present that requires admission and they must continue care with follow-up as given below or physician of their choice.     FOLLOW-UP  Davin Whitaker MD  [address] [address]  [phone]    Schedule an appointment as soon as possible for a visit   As needed         Medication List      No changes were made to your prescriptions during this visit.           Please note that portions of this were completed with a voice recognition program.       Note Disclaimer: At [facility], we believe that sharing information builds trust and better relationships. You are receiving this note because you are receiving care at [facility] or recently visited. It is possible you will see health information before a provider has talked with you about it. This kind of information can be easy to misunderstand. To help you fully understand what it means for your health, we urge you to discuss this note with your provider.     Tico Hernandez MD  11/25/23 2124       Tico Hernandez MD  11/25/23 2124

## 2023-11-30 ENCOUNTER — TELEPHONE (OUTPATIENT)
Dept: OBSTETRICS AND GYNECOLOGY | Facility: CLINIC | Age: 28
End: 2023-11-30
Payer: COMMERCIAL

## 2023-11-30 RX ORDER — ENOXAPARIN SODIUM 100 MG/ML
1 INJECTION SUBCUTANEOUS EVERY 12 HOURS
Qty: 24 ML | Refills: 1 | Status: SHIPPED | OUTPATIENT
Start: 2023-11-30 | End: 2024-01-03

## 2023-11-30 NOTE — TELEPHONE ENCOUNTER
Patient is requesting refills of Rx - Enoxaparin Sodium (LOVENOX) 80 MG/0.8ML solution prefilled syringe syringe [[id number]]     If possible by provider. She states she only has 1 syringe left.     Please advise, thanks!    Pharmacy confirmed -   [id number] - [place], [place] - [address] AT [facility] [phone] [facility] [phone] FX

## 2023-12-12 ENCOUNTER — TELEPHONE (OUTPATIENT)
Dept: OBSTETRICS AND GYNECOLOGY | Facility: CLINIC | Age: 28
End: 2023-12-12
Payer: COMMERCIAL

## 2023-12-12 NOTE — TELEPHONE ENCOUNTER
Genny    Find out what restrictions she needs.  If she does not need any, she can be fully released to work.    Thanks    Sherman

## 2023-12-12 NOTE — TELEPHONE ENCOUNTER
Caller: Osbaldo Mayo    Relationship: Self    Best call back number: [phone]    What form or medical record are you requesting: RETURN TO WORK NOTE    Who is requesting this form or medical record from you: PT'S EMPLOYER    How would you like to receive the form or medical records (pick-up, mail, fax): PUT IN 'MYCHART'  I  Timeframe paperwork needed: ASAP - BEFORE FRIDAY 12/15/23    Additional notes: PT NEEDS A NOTE STATING SHE CAN RETURN TO WORK ON FRIDAY, 12/15/23    PLEASE CALL PT TO CONFIRM

## 2023-12-12 NOTE — TELEPHONE ENCOUNTER
Left message for Osbaldo to ask if there are any restrictions she needs in her work note. Asked her to call back or send a My Chart message.

## 2023-12-13 NOTE — TELEPHONE ENCOUNTER
Provider: MARY    Caller: LUKAS RALPH    Relationship to Patient: SELF    Phone Number: [phone]  IF NEEDED CALL ANYTIME, IT IS OKAY TO LVM.    Reason for Call: PATIENT RETURNED JEYSON'S CALL. PER PATIENT WORK RESTRICTIONS ARE NOT NEEDED. PATIENT IS ABLE TO TAKE SEVERAL BREAKS THROUGH OUT THE DAY. -HUB

## 2023-12-20 ENCOUNTER — ROUTINE PRENATAL (OUTPATIENT)
Dept: OBSTETRICS AND GYNECOLOGY | Facility: CLINIC | Age: 28
End: 2023-12-20
Payer: COMMERCIAL

## 2023-12-20 VITALS — BODY MASS INDEX: 26.15 KG/M2 | DIASTOLIC BLOOD PRESSURE: 68 MMHG | SYSTOLIC BLOOD PRESSURE: 110 MMHG | WEIGHT: 172 LBS

## 2023-12-20 DIAGNOSIS — Z3A.16 16 WEEKS GESTATION OF PREGNANCY: Primary | ICD-10-CM

## 2023-12-20 DIAGNOSIS — Z34.82 MULTIGRAVIDA IN SECOND TRIMESTER: ICD-10-CM

## 2023-12-20 DIAGNOSIS — O22.92 THROMBOSIS AFFECTING PREGNANCY IN SECOND TRIMESTER, ANTEPARTUM: ICD-10-CM

## 2023-12-20 LAB
EXPIRATION DATE: ABNORMAL
GLUCOSE UR STRIP-MCNC: NEGATIVE MG/DL
Lab: ABNORMAL
PROT UR STRIP-MCNC: ABNORMAL MG/DL

## 2023-12-20 NOTE — PROGRESS NOTES
"Cc:  Pregnancy follow up.  No major complaints.  Nausea has generally resolved.  No bleeding.  Vitals reviewed by me.  Gen - alert and pleasant.  Abdomen - nontender  A/P:  IUP at 16 weeks with history of DVT  - DVT.  Continue Lovenox thru 6 weeks postpartum.  Discussed \"planning\" delivery at 39 weeks so that LMWH can be held to reduce risks of bleeding.  Patient should consider thrombophilia work up postpartum.  - Had discussion regarding vaccines in pregnancy, including benefit to baby.  Maternal vaccines lead to  protection.  Patient still declines vaccines.  - Follow up in 4 weeks and do sonogram for anatomy.  "

## 2024-01-08 ENCOUNTER — APPOINTMENT (OUTPATIENT)
Dept: GENERAL RADIOLOGY | Facility: HOSPITAL | Age: 29
End: 2024-01-08
Payer: COMMERCIAL

## 2024-01-08 ENCOUNTER — HOSPITAL ENCOUNTER (EMERGENCY)
Facility: HOSPITAL | Age: 29
Discharge: HOME | End: 2024-01-08
Attending: STUDENT IN AN ORGANIZED HEALTH CARE EDUCATION/TRAINING PROGRAM | Admitting: EMERGENCY MEDICINE
Payer: COMMERCIAL

## 2024-01-08 VITALS
RESPIRATION RATE: 16 BRPM | BODY MASS INDEX: 26.36 KG/M2 | DIASTOLIC BLOOD PRESSURE: 73 MMHG | HEART RATE: 117 BPM | WEIGHT: 178 LBS | HEIGHT: 69 IN | TEMPERATURE: 101.1 F | SYSTOLIC BLOOD PRESSURE: 106 MMHG | OXYGEN SATURATION: 100 %

## 2024-01-08 DIAGNOSIS — Z34.90 PREGNANCY, UNSPECIFIED GESTATIONAL AGE: ICD-10-CM

## 2024-01-08 DIAGNOSIS — R50.9 FEVER, UNSPECIFIED FEVER CAUSE: Primary | ICD-10-CM

## 2024-01-08 DIAGNOSIS — R51.9 ACUTE NONINTRACTABLE HEADACHE, UNSPECIFIED HEADACHE TYPE: ICD-10-CM

## 2024-01-08 DIAGNOSIS — E87.6 HYPOKALEMIA: ICD-10-CM

## 2024-01-08 LAB
ALBUMIN SERPL-MCNC: 3.3 G/DL (ref 3.5–5.2)
ALBUMIN/GLOB SERPL: 1.1 G/DL
ALP SERPL-CCNC: 69 U/L (ref 39–117)
ALT SERPL W P-5'-P-CCNC: 7 U/L (ref 1–33)
ANION GAP SERPL CALCULATED.3IONS-SCNC: 11.3 MMOL/L (ref 5–15)
AST SERPL-CCNC: 10 U/L (ref 1–32)
BACTERIA UR QL AUTO: ABNORMAL /HPF
BASOPHILS # BLD AUTO: 0 10*3/MM3 (ref 0–0.2)
BASOPHILS NFR BLD AUTO: 0 % (ref 0–1.5)
BILIRUB SERPL-MCNC: 0.3 MG/DL (ref 0–1.2)
BILIRUB UR QL STRIP: NEGATIVE
BUN SERPL-MCNC: 5 MG/DL (ref 6–20)
BUN/CREAT SERPL: 12.5 (ref 7–25)
CALCIUM SPEC-SCNC: 8.7 MG/DL (ref 8.6–10.5)
CHLORIDE SERPL-SCNC: 104 MMOL/L (ref 98–107)
CK SERPL-CCNC: 18 U/L (ref 20–180)
CLARITY UR: ABNORMAL
CO2 SERPL-SCNC: 18.7 MMOL/L (ref 22–29)
COLOR UR: YELLOW
CREAT SERPL-MCNC: 0.4 MG/DL (ref 0.57–1)
D-LACTATE SERPL-SCNC: 0.9 MMOL/L (ref 0.5–2)
DEPRECATED RDW RBC AUTO: 41.2 FL (ref 37–54)
EGFRCR SERPLBLD CKD-EPI 2021: 136.7 ML/MIN/1.73
EOSINOPHIL # BLD AUTO: 0.01 10*3/MM3 (ref 0–0.4)
EOSINOPHIL NFR BLD AUTO: 0.2 % (ref 0.3–6.2)
ERYTHROCYTE [DISTWIDTH] IN BLOOD BY AUTOMATED COUNT: 14.4 % (ref 12.3–15.4)
GLOBULIN UR ELPH-MCNC: 3 GM/DL
GLUCOSE SERPL-MCNC: 96 MG/DL (ref 65–99)
GLUCOSE UR STRIP-MCNC: NEGATIVE MG/DL
HCT VFR BLD AUTO: 29 % (ref 34–46.6)
HGB BLD-MCNC: 9.7 G/DL (ref 12–15.9)
HGB UR QL STRIP.AUTO: NEGATIVE
HYALINE CASTS UR QL AUTO: ABNORMAL /LPF
IMM GRANULOCYTES # BLD AUTO: 0.02 10*3/MM3 (ref 0–0.05)
IMM GRANULOCYTES NFR BLD AUTO: 0.4 % (ref 0–0.5)
KETONES UR QL STRIP: ABNORMAL
LEUKOCYTE ESTERASE UR QL STRIP.AUTO: ABNORMAL
LYMPHOCYTES # BLD AUTO: 0.55 10*3/MM3 (ref 0.7–3.1)
LYMPHOCYTES NFR BLD AUTO: 11.2 % (ref 19.6–45.3)
MCH RBC QN AUTO: 26.6 PG (ref 26.6–33)
MCHC RBC AUTO-ENTMCNC: 33.4 G/DL (ref 31.5–35.7)
MCV RBC AUTO: 79.5 FL (ref 79–97)
MONOCYTES # BLD AUTO: 0.48 10*3/MM3 (ref 0.1–0.9)
MONOCYTES NFR BLD AUTO: 9.7 % (ref 5–12)
MUCOUS THREADS URNS QL MICRO: ABNORMAL /HPF
NEUTROPHILS NFR BLD AUTO: 3.87 10*3/MM3 (ref 1.7–7)
NEUTROPHILS NFR BLD AUTO: 78.5 % (ref 42.7–76)
NITRITE UR QL STRIP: NEGATIVE
NRBC BLD AUTO-RTO: 0 /100 WBC (ref 0–0.2)
PH UR STRIP.AUTO: 7.5 [PH] (ref 5–8)
PLATELET # BLD AUTO: 227 10*3/MM3 (ref 140–450)
PMV BLD AUTO: 10.8 FL (ref 6–12)
POTASSIUM SERPL-SCNC: 3 MMOL/L (ref 3.5–5.2)
PROT SERPL-MCNC: 6.3 G/DL (ref 6–8.5)
PROT UR QL STRIP: NEGATIVE
RBC # BLD AUTO: 3.65 10*6/MM3 (ref 3.77–5.28)
RBC # UR STRIP: ABNORMAL /HPF
REF LAB TEST METHOD: ABNORMAL
SODIUM SERPL-SCNC: 134 MMOL/L (ref 136–145)
SP GR UR STRIP: 1.01 (ref 1–1.03)
SQUAMOUS #/AREA URNS HPF: ABNORMAL /HPF
UROBILINOGEN UR QL STRIP: ABNORMAL
WBC # UR STRIP: ABNORMAL /HPF
WBC NRBC COR # BLD AUTO: 4.93 10*3/MM3 (ref 3.4–10.8)

## 2024-01-08 PROCEDURE — 25810000003 LACTATED RINGERS SOLUTION: Performed by: STUDENT IN AN ORGANIZED HEALTH CARE EDUCATION/TRAINING PROGRAM

## 2024-01-08 PROCEDURE — 85025 COMPLETE CBC W/AUTO DIFF WBC: CPT | Performed by: STUDENT IN AN ORGANIZED HEALTH CARE EDUCATION/TRAINING PROGRAM

## 2024-01-08 PROCEDURE — 83735 ASSAY OF MAGNESIUM: CPT | Performed by: EMERGENCY MEDICINE

## 2024-01-08 PROCEDURE — 80053 COMPREHEN METABOLIC PANEL: CPT | Performed by: STUDENT IN AN ORGANIZED HEALTH CARE EDUCATION/TRAINING PROGRAM

## 2024-01-08 PROCEDURE — 25010000002 PROCHLORPERAZINE 10 MG/2ML SOLUTION: Performed by: STUDENT IN AN ORGANIZED HEALTH CARE EDUCATION/TRAINING PROGRAM

## 2024-01-08 PROCEDURE — 83605 ASSAY OF LACTIC ACID: CPT | Performed by: STUDENT IN AN ORGANIZED HEALTH CARE EDUCATION/TRAINING PROGRAM

## 2024-01-08 PROCEDURE — 96374 THER/PROPH/DIAG INJ IV PUSH: CPT

## 2024-01-08 PROCEDURE — 71045 X-RAY EXAM CHEST 1 VIEW: CPT

## 2024-01-08 PROCEDURE — 99283 EMERGENCY DEPT VISIT LOW MDM: CPT

## 2024-01-08 PROCEDURE — 82550 ASSAY OF CK (CPK): CPT | Performed by: STUDENT IN AN ORGANIZED HEALTH CARE EDUCATION/TRAINING PROGRAM

## 2024-01-08 PROCEDURE — 0202U NFCT DS 22 TRGT SARS-COV-2: CPT | Performed by: STUDENT IN AN ORGANIZED HEALTH CARE EDUCATION/TRAINING PROGRAM

## 2024-01-08 PROCEDURE — 36415 COLL VENOUS BLD VENIPUNCTURE: CPT

## 2024-01-08 PROCEDURE — 81001 URINALYSIS AUTO W/SCOPE: CPT | Performed by: STUDENT IN AN ORGANIZED HEALTH CARE EDUCATION/TRAINING PROGRAM

## 2024-01-08 PROCEDURE — 87040 BLOOD CULTURE FOR BACTERIA: CPT | Performed by: STUDENT IN AN ORGANIZED HEALTH CARE EDUCATION/TRAINING PROGRAM

## 2024-01-08 RX ORDER — PROCHLORPERAZINE EDISYLATE 5 MG/ML
10 INJECTION INTRAMUSCULAR; INTRAVENOUS ONCE
Status: COMPLETED | OUTPATIENT
Start: 2024-01-08 | End: 2024-01-08

## 2024-01-08 RX ORDER — ACETAMINOPHEN 500 MG
1000 TABLET ORAL ONCE
Status: COMPLETED | OUTPATIENT
Start: 2024-01-08 | End: 2024-01-08

## 2024-01-08 RX ADMIN — SODIUM CHLORIDE, POTASSIUM CHLORIDE, SODIUM LACTATE AND CALCIUM CHLORIDE 1000 ML: 600; 310; 30; 20 INJECTION, SOLUTION INTRAVENOUS at 22:41

## 2024-01-08 RX ADMIN — PROCHLORPERAZINE EDISYLATE 10 MG: 5 INJECTION INTRAMUSCULAR; INTRAVENOUS at 22:36

## 2024-01-08 RX ADMIN — ACETAMINOPHEN 1000 MG: 500 TABLET ORAL at 22:26

## 2024-01-09 LAB
B PARAPERT DNA SPEC QL NAA+PROBE: NOT DETECTED
B PERT DNA SPEC QL NAA+PROBE: NOT DETECTED
C PNEUM DNA NPH QL NAA+NON-PROBE: NOT DETECTED
FLUAV SUBTYP SPEC NAA+PROBE: NOT DETECTED
FLUBV RNA ISLT QL NAA+PROBE: NOT DETECTED
HADV DNA SPEC NAA+PROBE: NOT DETECTED
HCOV 229E RNA SPEC QL NAA+PROBE: NOT DETECTED
HCOV HKU1 RNA SPEC QL NAA+PROBE: NOT DETECTED
HCOV NL63 RNA SPEC QL NAA+PROBE: NOT DETECTED
HCOV OC43 RNA SPEC QL NAA+PROBE: NOT DETECTED
HMPV RNA NPH QL NAA+NON-PROBE: NOT DETECTED
HPIV1 RNA ISLT QL NAA+PROBE: NOT DETECTED
HPIV2 RNA SPEC QL NAA+PROBE: NOT DETECTED
HPIV3 RNA NPH QL NAA+PROBE: NOT DETECTED
HPIV4 P GENE NPH QL NAA+PROBE: NOT DETECTED
M PNEUMO IGG SER IA-ACNC: NOT DETECTED
MAGNESIUM SERPL-MCNC: 1.7 MG/DL (ref 1.6–2.6)
RHINOVIRUS RNA SPEC NAA+PROBE: NOT DETECTED
RSV RNA NPH QL NAA+NON-PROBE: NOT DETECTED
SARS-COV-2 RNA NPH QL NAA+NON-PROBE: NOT DETECTED

## 2024-01-09 NOTE — ED NOTES
Pt reports a cough starting 2 days ago, has since went from happening at night to happening all day. Took tylenol this morning. New onset of all over body aches with cramping to the left side spreading to the back of her body, a shooting pain she is feeling on the left side of abd.

## 2024-01-09 NOTE — ED PROVIDER NOTES
EMERGENCY DEPARTMENT ENCOUNTER    Room Number:  15/15  PCP: Provider, No Known  Patient Care Team:  Provider, No Known as PCP - General   Independent Historians: Patient    HPI:  Chief Complaint: Fever, myalgias    A complete HPI/ROS/PMH/PSH/SH/FH are unobtainable due to: None    Chronic or social conditions impacting patient care (Social Determinants of Health): None  (Financial Resource Strain / Food Insecurity / Transportation Needs / Physical Activity / Stress / Social Connections / Intimate Partner Violence / Housing Stability)    Context: Osbaldo Mayo is a 28 y.o. female who presents to the ED c/o acute cough body aches and headache for about 2 days.  Patient is 5 months pregnant G4, P3.  Pregnancy been complicated by hyperemesis.  Patient also has history of DVT.  Patient denies any abdominal pain vaginal bleeding or contractions.  At this time she is feeling somewhat better.  Also ports intermittent sore throat.    Review of prior external notes (non-ED) -and- Review of prior external test results outside of this encounter: I have reviewed patient's routine prenatal note from Dr. Blanton from 12/20/2023-at that time patient with IUP at 16 weeks history of DVT noted.  Patient is on Lovenox currently.    Prescription drug monitoring program review:         PAST MEDICAL HISTORY  Active Ambulatory Problems     Diagnosis Date Noted    Hyperemesis gravidarum 10/16/2023    Pregnancy 11/05/2023     Resolved Ambulatory Problems     Diagnosis Date Noted    No Resolved Ambulatory Problems     Past Medical History:   Diagnosis Date    Anemia     Chlamydia     Heart murmur     Hypoglycemia     Uterine fibroids affecting pregnancy, antepartum          PAST SURGICAL HISTORY  Past Surgical History:   Procedure Laterality Date    DILATATION AND CURETTAGE      WISDOM TOOTH EXTRACTION           FAMILY HISTORY  Family History   Problem Relation Age of Onset    Hypertension Mother     Diabetes Mother     No Known Problems  Father     Asthma Sister     No Known Problems Sister     No Known Problems Sister     No Known Problems Sister     Asthma Brother     No Known Problems Brother     No Known Problems Maternal Grandmother     Cancer Maternal Grandfather     Breast cancer Paternal Grandmother     No Known Problems Paternal Grandfather     Cancer Other         Breat    Diabetes Other     Hypertension Other     Anesthesia problems Neg Hx     Broken bones Neg Hx     Clotting disorder Neg Hx     Collagen disease Neg Hx     Dislocations Neg Hx     Osteoporosis Neg Hx     Rheumatologic disease Neg Hx     Scoliosis Neg Hx     Severe sprains Neg Hx          SOCIAL HISTORY  Social History     Socioeconomic History    Marital status: Single   Tobacco Use    Smoking status: Former     Types: Cigars     Start date:      Quit date: 2023     Years since quittin.3    Smokeless tobacco: Never    Tobacco comments:     Smoke 2 cigars a day.   Vaping Use    Vaping Use: Never used   Substance and Sexual Activity    Alcohol use: Not Currently    Drug use: Not Currently    Sexual activity: Yes     Birth control/protection: None         ALLERGIES  Patient has no known allergies.        REVIEW OF SYSTEMS  Review of Systems  Included in HPI  All systems reviewed and negative except for those discussed in HPI.      PHYSICAL EXAM    I have reviewed the triage vital signs and nursing notes.    ED Triage Vitals   Temp Heart Rate Resp BP SpO2   24 2144 24 2144 24 2144 24 2144 24 2144   (!) 101.1 °F (38.4 °C) (!) 131 18 118/54 100 %      Temp src Heart Rate Source Patient Position BP Location FiO2 (%)   -- 24 --    Monitor Lying Left arm        Physical Exam  GENERAL: alert, no acute distress  SKIN: Warm, dry  HENT: Normocephalic, atraumatic  EYES: no scleral icterus  CV: regular rhythm, regular rate  RESPIRATORY: normal effort, lungs clear  ABDOMEN: soft, nontender,  nondistended  MUSCULOSKELETAL: no deformity  NEURO: alert, moves all extremities, follows commands                                                               LAB RESULTS  Recent Results (from the past 24 hour(s))   Respiratory Panel PCR w/COVID-19(SARS-CoV-2) BHAVANA/SERGIO/LORE/PAD/COR/BISHOP In-House, NP Swab in UTM/VTM, 2 HR TAT - Swab, Nasopharynx    Collection Time: 01/08/24 10:06 PM    Specimen: Nasopharynx; Swab   Result Value Ref Range    ADENOVIRUS, PCR Not Detected Not Detected    Coronavirus 229E Not Detected Not Detected    Coronavirus HKU1 Not Detected Not Detected    Coronavirus NL63 Not Detected Not Detected    Coronavirus OC43 Not Detected Not Detected    COVID19 Not Detected Not Detected - Ref. Range    Human Metapneumovirus Not Detected Not Detected    Human Rhinovirus/Enterovirus Not Detected Not Detected    Influenza A PCR Not Detected Not Detected    Influenza B PCR Not Detected Not Detected    Parainfluenza Virus 1 Not Detected Not Detected    Parainfluenza Virus 2 Not Detected Not Detected    Parainfluenza Virus 3 Not Detected Not Detected    Parainfluenza Virus 4 Not Detected Not Detected    RSV, PCR Not Detected Not Detected    Bordetella pertussis pcr Not Detected Not Detected    Bordetella parapertussis PCR Not Detected Not Detected    Chlamydophila pneumoniae PCR Not Detected Not Detected    Mycoplasma pneumo by PCR Not Detected Not Detected   Comprehensive Metabolic Panel    Collection Time: 01/08/24 10:16 PM    Specimen: Blood   Result Value Ref Range    Glucose 96 65 - 99 mg/dL    BUN 5 (L) 6 - 20 mg/dL    Creatinine 0.40 (L) 0.57 - 1.00 mg/dL    Sodium 134 (L) 136 - 145 mmol/L    Potassium 3.0 (L) 3.5 - 5.2 mmol/L    Chloride 104 98 - 107 mmol/L    CO2 18.7 (L) 22.0 - 29.0 mmol/L    Calcium 8.7 8.6 - 10.5 mg/dL    Total Protein 6.3 6.0 - 8.5 g/dL    Albumin 3.3 (L) 3.5 - 5.2 g/dL    ALT (SGPT) 7 1 - 33 U/L    AST (SGOT) 10 1 - 32 U/L    Alkaline Phosphatase 69 39 - 117 U/L    Total Bilirubin  0.3 0.0 - 1.2 mg/dL    Globulin 3.0 gm/dL    A/G Ratio 1.1 g/dL    BUN/Creatinine Ratio 12.5 7.0 - 25.0    Anion Gap 11.3 5.0 - 15.0 mmol/L    eGFR 136.7 >60.0 mL/min/1.73   Lactic Acid, Plasma    Collection Time: 01/08/24 10:16 PM    Specimen: Blood   Result Value Ref Range    Lactate 0.9 0.5 - 2.0 mmol/L   CK    Collection Time: 01/08/24 10:16 PM    Specimen: Blood   Result Value Ref Range    Creatine Kinase 18 (L) 20 - 180 U/L   CBC Auto Differential    Collection Time: 01/08/24 10:16 PM    Specimen: Blood   Result Value Ref Range    WBC 4.93 3.40 - 10.80 10*3/mm3    RBC 3.65 (L) 3.77 - 5.28 10*6/mm3    Hemoglobin 9.7 (L) 12.0 - 15.9 g/dL    Hematocrit 29.0 (L) 34.0 - 46.6 %    MCV 79.5 79.0 - 97.0 fL    MCH 26.6 26.6 - 33.0 pg    MCHC 33.4 31.5 - 35.7 g/dL    RDW 14.4 12.3 - 15.4 %    RDW-SD 41.2 37.0 - 54.0 fl    MPV 10.8 6.0 - 12.0 fL    Platelets 227 140 - 450 10*3/mm3    Neutrophil % 78.5 (H) 42.7 - 76.0 %    Lymphocyte % 11.2 (L) 19.6 - 45.3 %    Monocyte % 9.7 5.0 - 12.0 %    Eosinophil % 0.2 (L) 0.3 - 6.2 %    Basophil % 0.0 0.0 - 1.5 %    Immature Grans % 0.4 0.0 - 0.5 %    Neutrophils, Absolute 3.87 1.70 - 7.00 10*3/mm3    Lymphocytes, Absolute 0.55 (L) 0.70 - 3.10 10*3/mm3    Monocytes, Absolute 0.48 0.10 - 0.90 10*3/mm3    Eosinophils, Absolute 0.01 0.00 - 0.40 10*3/mm3    Basophils, Absolute 0.00 0.00 - 0.20 10*3/mm3    Immature Grans, Absolute 0.02 0.00 - 0.05 10*3/mm3    nRBC 0.0 0.0 - 0.2 /100 WBC   Magnesium    Collection Time: 01/08/24 10:16 PM    Specimen: Blood   Result Value Ref Range    Magnesium 1.7 1.6 - 2.6 mg/dL   Urinalysis With Microscopic If Indicated (No Culture) - Urine, Clean Catch    Collection Time: 01/08/24 11:06 PM    Specimen: Urine, Clean Catch   Result Value Ref Range    Color, UA Yellow Yellow, Straw    Appearance, UA Cloudy (A) Clear    pH, UA 7.5 5.0 - 8.0    Specific Gravity, UA 1.014 1.005 - 1.030    Glucose, UA Negative Negative    Ketones, UA Trace (A) Negative     Bilirubin, UA Negative Negative    Blood, UA Negative Negative    Protein, UA Negative Negative    Leuk Esterase, UA Small (1+) (A) Negative    Nitrite, UA Negative Negative    Urobilinogen, UA 1.0 E.U./dL 0.2 - 1.0 E.U./dL   Urinalysis, Microscopic Only - Urine, Clean Catch    Collection Time: 01/08/24 11:06 PM    Specimen: Urine, Clean Catch   Result Value Ref Range    RBC, UA None Seen None Seen, 0-2 /HPF    WBC, UA 3-5 (A) None Seen, 0-2 /HPF    Bacteria, UA 1+ (A) None Seen /HPF    Squamous Epithelial Cells, UA 7-12 (A) None Seen, 0-2 /HPF    Hyaline Casts, UA None Seen None Seen /LPF    Mucus, UA Trace None Seen, Trace /HPF    Methodology Manual Light Microscopy        I ordered the above labs and independently reviewed the results.        RADIOLOGY  XR Chest 1 View    Result Date: 1/8/2024  SINGLE VIEW OF THE CHEST  HISTORY: Cough and shortness of breath  COMPARISON: November 12, 2023  FINDINGS: Heart size is within normal limits. No pneumothorax, pleural effusion, or acute infiltrate is seen.      No acute findings.  This report was finalized on 1/8/2024 10:33 PM by Dr. Graciela Aviles M.D on Workstation: [id number]       I ordered the above noted radiological studies. Reviewed by me and discussed with radiologist.  See dictation for official radiology interpretation.      PROCEDURES    Procedures      MEDICATIONS GIVEN IN ER    Medications   prochlorperazine (COMPAZINE) injection 10 mg (10 mg Intravenous Given 1/8/24 2236)   lactated ringers bolus 1,000 mL (0 mL Intravenous Stopped 1/8/24 2335)   acetaminophen (TYLENOL) tablet 1,000 mg (1,000 mg Oral Given 1/8/24 2226)         ORDERS PLACED DURING THIS VISIT:  Orders Placed This Encounter   Procedures    Respiratory Panel PCR w/COVID-19(SARS-CoV-2) [facility]/SERGIO/LORE/PAD/COR/BISHOP In-House, NP Swab in UTM/VTM, 2 HR TAT - Swab, Nasopharynx    Blood Culture - Blood,    Blood Culture - Blood,    XR Chest 1 View    Comprehensive Metabolic Panel    Urinalysis With  Microscopic If Indicated (No Culture) - Urine, Clean Catch    Lactic Acid, Plasma    CK    CBC Auto Differential    Magnesium    Urinalysis, Microscopic Only - Urine, Clean Catch    CBC & Differential         PROGRESS, DATA ANALYSIS, CONSULTS, AND MEDICAL DECISION MAKING    All labs have been independently interpreted by me.  All radiology studies have been reviewed by me and discussed with radiologist dictating the report.   EKG's independently viewed and interpreted by me.  Discussion below represents my analysis of pertinent findings related to patient's condition, differential diagnosis, treatment plan and final disposition.    Differential diagnosis includes but is not limited to viral illness, COVID-19, UTI.    Clinical Scores:              ED Course as of 24 0509   Mon  First look:  Patient presented the emergency department with bodyaches, cough, sore throat.  Also with headache.  Symptoms started last night and progressed today.  Last dose of Tylenol was this morning.  Currently 5 months pregnant, -0-0-3.  Pregnancy complicated by hyperemesis.  Ordered labs, viral swab, medication to treat headache and nausea. [FS]   2312 Lactate: 0.9 [TJ]   2312 Creatine Kinase(!): 18 [TJ]   2312 WBC: 4.93 [TJ]   2312 Hemoglobin(!): 9.7 [TJ]   2312 Platelets: 227 [TJ]   2312 Creatinine(!): 0.40 [TJ]   2312 Potassium(!): 3.0 [TJ]   2312 CO2(!): 18.7 [TJ]   2315 I have independently reviewed patient's chest x-ray; my interpretation is no infiltrate no pneumothorax [TJ]   2341 Patient evidently signed out.  Told nurse that her headache had resolved and she was ready to go home. [TJ]      ED Course User Index  [FS] Master Dickson MD  [TJ] Brady Hayes MD               PPE: The patient wore a mask and I wore an N95 mask throughout the entire patient encounter.       AS OF 05:09 EST VITALS:    BP - 106/73  HR - 117  TEMP - (!) 101.1 °F (38.4 °C)  O2 SATS - 100%        DIAGNOSIS  Final  diagnoses:   Fever, unspecified fever cause   Hypokalemia   Acute nonintractable headache, unspecified headache type   Pregnancy, unspecified gestational age         DISPOSITION  ED Disposition       ED Disposition   AMA    Condition   --    Comment   --                  Note Disclaimer: At [facility], we believe that sharing information builds trust and better relationships. You are receiving this note because you recently visited [facility]. It is possible you will see health information before a provider has talked with you about it. This kind of information can be easy to misunderstand. To help you fully understand what it means for your health, we urge you to discuss this note with your provider.         Brady Hayes MD  01/09/24 [id number]

## 2024-01-09 NOTE — ED NOTES
Pt called out to speak to a Dr and when this RN went to check on pt, pt stated that her headache was better but she felt anxious and wanted to leave. This RN told pt that not all her lab results have come back yet and educated pt on effects of leaving and staying. Pt was adamant about leaving. MD aware.

## 2024-01-09 NOTE — ED NOTES
Pt reports chills, headache, fever, bodyaches x2 days. Pt is 5mo pregnant, pt denies any vaginal bleeding, abdominal pain, or back pain.

## 2024-01-12 ENCOUNTER — ROUTINE PRENATAL (OUTPATIENT)
Dept: OBSTETRICS AND GYNECOLOGY | Facility: CLINIC | Age: 29
End: 2024-01-12
Payer: COMMERCIAL

## 2024-01-12 VITALS — WEIGHT: 173 LBS | SYSTOLIC BLOOD PRESSURE: 105 MMHG | BODY MASS INDEX: 25.55 KG/M2 | DIASTOLIC BLOOD PRESSURE: 62 MMHG

## 2024-01-12 DIAGNOSIS — O22.92 THROMBOSIS AFFECTING PREGNANCY IN SECOND TRIMESTER, ANTEPARTUM: ICD-10-CM

## 2024-01-12 DIAGNOSIS — Z34.82 MULTIGRAVIDA IN SECOND TRIMESTER: Primary | ICD-10-CM

## 2024-01-12 DIAGNOSIS — Z3A.19 19 WEEKS GESTATION OF PREGNANCY: ICD-10-CM

## 2024-01-12 DIAGNOSIS — O09.892 SHORT INTERVAL BETWEEN PREGNANCIES AFFECTING PREGNANCY IN SECOND TRIMESTER, ANTEPARTUM: ICD-10-CM

## 2024-01-12 NOTE — PROGRESS NOTES
Cc:  Pregnancy follow up.  No complaints.  Has some fetal movement.  No bleeding or spotting.  Vitals reviewed by me.  Gen - alert and pleasant.  Abdomen - gravid  Ultrasound with normal anatomy.  A/P:  IUP at 19 weeks with history of DVT, requests PP sterilization.  - History of DVT.  Patient has had lapse in LMWH.  Encouraged to restart.  - Desires sterilization.  Sign tubal consents today.  - Patient refuses Covid and influenza vaccine.  She agrees to RSV and TDaP at appropriate time.  - Discussed maternal well being.

## 2024-01-13 LAB
BACTERIA SPEC AEROBE CULT: NORMAL
BACTERIA SPEC AEROBE CULT: NORMAL

## 2024-03-18 ENCOUNTER — ROUTINE PRENATAL (OUTPATIENT)
Dept: OBSTETRICS AND GYNECOLOGY | Facility: CLINIC | Age: 29
End: 2024-03-18
Payer: COMMERCIAL

## 2024-03-18 DIAGNOSIS — Z67.91 RH NEGATIVE STATUS DURING PREGNANCY IN THIRD TRIMESTER: ICD-10-CM

## 2024-03-18 DIAGNOSIS — Z34.83 MULTIGRAVIDA IN THIRD TRIMESTER: Primary | ICD-10-CM

## 2024-03-18 DIAGNOSIS — Z3A.29 29 WEEKS GESTATION OF PREGNANCY: ICD-10-CM

## 2024-03-18 DIAGNOSIS — O99.019 MATERNAL ANEMIA IN PREGNANCY, ANTEPARTUM: ICD-10-CM

## 2024-03-18 DIAGNOSIS — O26.843 FUNDAL HEIGHT LOW FOR DATES IN THIRD TRIMESTER: ICD-10-CM

## 2024-03-18 DIAGNOSIS — O26.893 RH NEGATIVE STATUS DURING PREGNANCY IN THIRD TRIMESTER: ICD-10-CM

## 2024-03-18 PROCEDURE — 99214 OFFICE O/P EST MOD 30 MIN: CPT | Performed by: OBSTETRICS & GYNECOLOGY

## 2024-03-18 NOTE — PROGRESS NOTES
Cc:  Pregnancy follow up.  Patient has not been seen in 10 weeks due to work and other social issues.  She reports not taking LMWH or other medications because she cannot remember.  Patient states she is not going to resume her LMWH.  She reports good FM.  No bleeding or spotting.  Vitals reviewed by me.  Gen - alert and pleasant.  Abdomen - gravid, nontender.  Will come tomorrow to complete GTT, CBC & FTA.  A/P:  IUP at 29 weeks with size less than dates, anemia, and Rh negative.  - Fundal height low.  Sonogram in 2 weeks.  - Anemia.  CBC with labs.  - Rh negative.  Type and screen ordered.  If labs ok, Rhogam to be given.

## 2024-03-20 ENCOUNTER — TELEPHONE (OUTPATIENT)
Dept: OBSTETRICS AND GYNECOLOGY | Facility: CLINIC | Age: 29
End: 2024-03-20
Payer: COMMERCIAL

## 2024-03-20 ENCOUNTER — PROCEDURE VISIT (OUTPATIENT)
Dept: OBSTETRICS AND GYNECOLOGY | Facility: CLINIC | Age: 29
End: 2024-03-20
Payer: COMMERCIAL

## 2024-03-20 VITALS — BODY MASS INDEX: 27.17 KG/M2 | DIASTOLIC BLOOD PRESSURE: 70 MMHG | SYSTOLIC BLOOD PRESSURE: 110 MMHG | WEIGHT: 184 LBS

## 2024-03-20 DIAGNOSIS — O26.893 RH NEGATIVE STATUS DURING PREGNANCY IN THIRD TRIMESTER: ICD-10-CM

## 2024-03-20 DIAGNOSIS — Z34.83 PRENATAL CARE, SUBSEQUENT PREGNANCY, THIRD TRIMESTER: Primary | ICD-10-CM

## 2024-03-20 DIAGNOSIS — Z34.83 MULTIGRAVIDA IN THIRD TRIMESTER: Primary | ICD-10-CM

## 2024-03-20 DIAGNOSIS — Z67.91 RH NEGATIVE STATUS DURING PREGNANCY IN THIRD TRIMESTER: ICD-10-CM

## 2024-03-20 LAB
GLUCOSE 1H P 50 G GLC PO SERPL-MCNC: 106 MG/DL (ref 70–139)
T PALLIDUM AB SER QL IF: NON REACTIVE

## 2024-03-20 NOTE — TELEPHONE ENCOUNTER
LAW    Patient needed to do type and screen.    Can you have her come in and give her rhogam and then she can do type and screen at same time?    Thanks    Sherman

## 2024-03-20 NOTE — TELEPHONE ENCOUNTER
I tried calling the patient there was no answer. I left a message asking her to call back.  3-20-2/lw

## 2024-03-21 LAB
ABO GROUP BLD: NORMAL
BLD GP AB SCN SERPL QL: NEGATIVE
RH BLD: NEGATIVE

## 2024-04-05 ENCOUNTER — ROUTINE PRENATAL (OUTPATIENT)
Dept: OBSTETRICS AND GYNECOLOGY | Facility: CLINIC | Age: 29
End: 2024-04-05
Payer: COMMERCIAL

## 2024-04-05 VITALS — BODY MASS INDEX: 28.06 KG/M2 | WEIGHT: 190 LBS | SYSTOLIC BLOOD PRESSURE: 112 MMHG | DIASTOLIC BLOOD PRESSURE: 70 MMHG

## 2024-04-05 DIAGNOSIS — Z34.93 THIRD TRIMESTER PREGNANCY: Primary | ICD-10-CM

## 2024-04-05 DIAGNOSIS — K59.09 OTHER CONSTIPATION: ICD-10-CM

## 2024-04-05 DIAGNOSIS — Z3A.31 31 WEEKS GESTATION OF PREGNANCY: ICD-10-CM

## 2024-04-05 LAB
GLUCOSE UR STRIP-MCNC: NEGATIVE MG/DL
LEUKOCYTE EST, POC: ABNORMAL
PROT UR STRIP-MCNC: ABNORMAL MG/DL

## 2024-04-05 RX ORDER — DOCUSATE SODIUM 100 MG/1
100 CAPSULE, LIQUID FILLED ORAL 2 TIMES DAILY
Qty: 60 CAPSULE | Refills: 1 | Status: SHIPPED | OUTPATIENT
Start: 2024-04-05

## 2024-04-05 NOTE — PROGRESS NOTES
OB VISIT 31 WEEKS      Chief Complaint   Patient presents with    Routine Prenatal Visit         Osbaldo is a 28 y.o.  31w6d being seen today for her obstetrical visit.  Patient reports  constipation. She has tried MiraLax for her constipation but she finds herself not taking it every day.  Fetal movement: normal. The patient currently sees Dr Whitaker.       REVIEW OF SYSTEMS  Cramping/contractions: denies  Vaginal bleeding: denies  Fetal movement: present  Leaking of fluid: denies    Review of Systems   Eyes:  Negative for blurred vision, double vision and visual disturbance.   Gastrointestinal:  Negative for abdominal pain.   Neurological:  Negative for light-headedness and headache.   All other systems reviewed and are negative.      /70   Wt 86.2 kg (190 lb)   LMP 2023 (Exact Date)   BMI 28.06 kg/m²     Prenatal Assessment  Fetal Heart Rate: 143  Fundal Height (cm): 32 cm  Movement: Present  Presentation: Vertex  Edema  LLE Edema: None  RLE Edema: None  Facial Edema: None    Physical Exam  Constitutional:       General: She is awake.      Appearance: Normal appearance. She is well-developed and well-groomed.   HENT:      Head: Normocephalic and atraumatic.   Pulmonary:      Effort: Pulmonary effort is normal.   Musculoskeletal:      Cervical back: Normal range of motion.   Neurological:      General: No focal deficit present.      Mental Status: She is alert and oriented to person, place, and time.   Skin:     General: Skin is warm and dry.   Psychiatric:         Mood and Affect: Mood normal.         Behavior: Behavior normal. Behavior is cooperative.   Vitals reviewed.         ASSESSMENT/PLAN    Diagnoses and all orders for this visit:    1. Third trimester pregnancy (Primary)  -     POC Urinalysis Dipstick  -     CBC (No Diff)    2. 31 weeks gestation of pregnancy  -     POC Urinalysis Dipstick  -     CBC (No Diff)    3. Other constipation  -     docusate sodium (Colace) 100 MG capsule;  Take 1 capsule by mouth 2 (Two) Times a Day.  Dispense: 60 capsule; Refill: 1         Urine dip today:  trace protein, negative glucose. Encouraged adequate water intake throughout the day.    Patient requests CBC today to assess for anemia.   Reviewed this stage of pregnancy, including constipation. Rx sent for docusate BID and also recommended MiraLax chewables.   Discussed US results from today.   Problem list updated.     Follow up in 2 weeks with Dr. Whitaker.     I spent 15 minutes caring for Osbaldo on this date of service. This time includes time spent by me in the following activities: preparing for the visit, reviewing tests, obtaining and/or reviewing a separately obtained history, performing a medically appropriate examination and/or evaluation, counseling and educating the patient/family/caregiver, ordering medications, tests, or procedures, referring and communicating with other health care professionals, documenting information in the medical record, independently interpreting results and communicating that information with the patient/family/caregiver, and care coordination.    Merced Pollack CNM  4/5/2024  14:26 EDT     GROWTH ULTRASOUND PRELIMINARY RESULT   4/5/2024  31w6d    INDICATION: for fetal growth, size less than dates  BPD: 8.43 cm  HC: 31.68 cm  AC: 27.63 cm  FL: 6.28 cm  OVERALL: 57.0%  EFW: 1990g, 4lb6oz  CHUCK: 9.46 cm  PRESENTATION: vertex  PLACENTA: posterior  INTERVAL GROWTH: appropriate, growth appropriate for gestational age.  CONSISTENT WITH DATES: yes   COMPARATIVE DATA: available for examination.  Merced Pollack CNM  4/5/2024  14:04 EDT

## 2024-04-06 LAB
ERYTHROCYTE [DISTWIDTH] IN BLOOD BY AUTOMATED COUNT: 14.7 % (ref 12.3–15.4)
HCT VFR BLD AUTO: 29.3 % (ref 34–46.6)
HGB BLD-MCNC: 9.2 G/DL (ref 12–15.9)
MCH RBC QN AUTO: 24.3 PG (ref 26.6–33)
MCHC RBC AUTO-ENTMCNC: 31.4 G/DL (ref 31.5–35.7)
MCV RBC AUTO: 77.5 FL (ref 79–97)
PLATELET # BLD AUTO: 316 10*3/MM3 (ref 140–450)
RBC # BLD AUTO: 3.78 10*6/MM3 (ref 3.77–5.28)
WBC # BLD AUTO: 5.37 10*3/MM3 (ref 3.4–10.8)

## 2024-04-06 RX ORDER — FERROUS SULFATE 325(65) MG
325 TABLET ORAL
Qty: 30 TABLET | Refills: 2 | Status: SHIPPED | OUTPATIENT
Start: 2024-04-06

## 2024-04-24 ENCOUNTER — ROUTINE PRENATAL (OUTPATIENT)
Dept: OBSTETRICS AND GYNECOLOGY | Facility: CLINIC | Age: 29
End: 2024-04-24
Payer: COMMERCIAL

## 2024-04-24 ENCOUNTER — TELEPHONE (OUTPATIENT)
Dept: OBSTETRICS AND GYNECOLOGY | Facility: CLINIC | Age: 29
End: 2024-04-24

## 2024-04-24 VITALS — DIASTOLIC BLOOD PRESSURE: 76 MMHG | WEIGHT: 189 LBS | BODY MASS INDEX: 27.91 KG/M2 | SYSTOLIC BLOOD PRESSURE: 116 MMHG

## 2024-04-24 DIAGNOSIS — Z3A.34 34 WEEKS GESTATION OF PREGNANCY: ICD-10-CM

## 2024-04-24 DIAGNOSIS — Z34.83 PRENATAL CARE, SUBSEQUENT PREGNANCY, THIRD TRIMESTER: Primary | ICD-10-CM

## 2024-04-24 DIAGNOSIS — O99.019 MATERNAL ANEMIA IN PREGNANCY, ANTEPARTUM: ICD-10-CM

## 2024-04-24 DIAGNOSIS — O22.30 DVT (DEEP VEIN THROMBOSIS) IN PREGNANCY: ICD-10-CM

## 2024-04-24 DIAGNOSIS — O09.893 SHORT INTERVAL BETWEEN PREGNANCIES AFFECTING PREGNANCY IN THIRD TRIMESTER, ANTEPARTUM: ICD-10-CM

## 2024-04-24 NOTE — PROGRESS NOTES
Cc:  Pregnancy follow up.  Patient states constipation is better - she is using MiraLax.  She never started her Iron due to issues with the pharmacy and insurance.  Good FM.  No bleeding or spotting.  Vitals reviewed by me.  Gen - alert and pleasant.  Abdomen - gravid, nontender.  A/P:  IUP at 34 weeks with thrombosis history, anemia, short interval.  - Thrombosis.  Patient not taking Lovenox for the past 2 months and has long history of noncompliance.  - Anemia.  Start iron.  - Short interval.  Sonogram at next visit.  - Discussed maternal well being.

## 2024-04-25 NOTE — TELEPHONE ENCOUNTER
Genny    Can you check in with her and find out if she is still taking her Lovenox?    Thanks    Sherman

## 2024-04-25 NOTE — TELEPHONE ENCOUNTER
Message sent to Dr. Whitaker patient states she has not been taking her Lovenox for a month or two. Kiarra SHETTY

## 2024-04-30 ENCOUNTER — HOSPITAL ENCOUNTER (EMERGENCY)
Facility: HOSPITAL | Age: 29
Discharge: HOME | End: 2024-04-30
Attending: OBSTETRICS & GYNECOLOGY | Admitting: OBSTETRICS & GYNECOLOGY
Payer: COMMERCIAL

## 2024-04-30 VITALS
HEART RATE: 87 BPM | BODY MASS INDEX: 29.49 KG/M2 | DIASTOLIC BLOOD PRESSURE: 72 MMHG | HEIGHT: 68 IN | TEMPERATURE: 97.8 F | OXYGEN SATURATION: 99 % | SYSTOLIC BLOOD PRESSURE: 117 MMHG | WEIGHT: 194.6 LBS | RESPIRATION RATE: 17 BRPM

## 2024-04-30 LAB
BACTERIA UR QL AUTO: ABNORMAL /HPF
BILIRUB UR QL STRIP: NEGATIVE
CLARITY UR: CLEAR
COLOR UR: YELLOW
GLUCOSE UR STRIP-MCNC: NEGATIVE MG/DL
HGB UR QL STRIP.AUTO: NEGATIVE
HYALINE CASTS UR QL AUTO: ABNORMAL /LPF
KETONES UR QL STRIP: ABNORMAL
LEUKOCYTE ESTERASE UR QL STRIP.AUTO: ABNORMAL
NITRITE UR QL STRIP: NEGATIVE
PH UR STRIP.AUTO: 7 [PH] (ref 5–8)
PROT UR QL STRIP: NEGATIVE
RBC # UR STRIP: ABNORMAL /HPF
REF LAB TEST METHOD: ABNORMAL
SP GR UR STRIP: 1.01 (ref 1–1.03)
SQUAMOUS #/AREA URNS HPF: ABNORMAL /HPF
UROBILINOGEN UR QL STRIP: ABNORMAL
WBC # UR STRIP: ABNORMAL /HPF

## 2024-04-30 PROCEDURE — 99284 EMERGENCY DEPT VISIT MOD MDM: CPT | Performed by: OBSTETRICS & GYNECOLOGY

## 2024-04-30 PROCEDURE — 81001 URINALYSIS AUTO W/SCOPE: CPT | Performed by: OBSTETRICS & GYNECOLOGY

## 2024-04-30 PROCEDURE — 87086 URINE CULTURE/COLONY COUNT: CPT | Performed by: OBSTETRICS & GYNECOLOGY

## 2024-04-30 PROCEDURE — 59025 FETAL NON-STRESS TEST: CPT

## 2024-04-30 NOTE — OBED NOTES
[facility]  Osabldo Ralph  : 1995  MRN: [id number]  CSN: [id number]    OB ED Provider Note    Subjective   Chief Complaint   Patient presents with    Back Pain    Abdominal Cramping     No Vaginal bleeding of leaking of fluids     Osbaldo Ralph is a 28 y.o. year old  with an Estimated Date of Delivery: 24 currently at 35w3d presenting with 6-7 hour history of moderate, constant LBP and irregular CTX that she cannot time. She denies ROM or VB. FM is present. Her back pain resolved upon laying down in [facility].     Prenatal care has been with Dr. Whitaker.  It has been complicated by history of DVT, non-compliant with Lovenox .    OB History    Para Term  AB Living   5 3 3 0 1 3   SAB IAB Ectopic Molar Multiple Live Births   1 0 0 0 0 3      # Outcome Date GA Lbr Wm/2nd Weight Sex Type Anes PTL Lv   5 Current            4 Term 23 38w1d / 00:39 2540 g (5 lb 9.6 oz) M Vag-Spont EPI  FELI      Name: DIONICIO RALPHONAL      Apgar1: 8  Apgar5: 9   3 Term 21 37w2d 17:24 / 00:25 2605 g (5 lb 11.9 oz) M Vag-Spont EPI N FELI      Name: DIONICIO RALPHONAL      Apgar1: 9  Apgar5: 9   2 Term 18 37w5d / 00:41 3005 g (6 lb 10 oz) M Vag-Spont EPI N FELI      Name: DIONICIO RALPHONAL      Apgar1: 8  Apgar5: 9   1 SAB         FD      Birth Comments: Suction     Past Medical History:   Diagnosis Date    Anemia     Chlamydia     Heart murmur     as a child    Hypoglycemia     Uterine fibroids affecting pregnancy, antepartum      Past Surgical History:   Procedure Laterality Date    DILATATION AND CURETTAGE      WISDOM TOOTH EXTRACTION       No current facility-administered medications for this encounter.    No Known Allergies  Social History    Tobacco Use      Smoking status: Former        Types: Cigars        Start date:         Quit date: 2023        Years since quittin.6      Smokeless tobacco: Never      Tobacco comments: Smoke 2 cigars a day.    Review of  "Systems   Gastrointestinal:  Positive for abdominal pain.   Musculoskeletal:  Positive for back pain.   All other systems reviewed and are negative.        Objective   /72   Pulse 87   Temp 97.8 °F (36.6 °C) (Oral)   Resp 17   Ht 172.7 cm (67.99\")   Wt 88.3 kg (194 lb 9.6 oz)   LMP 08/26/2023 (Exact Date)   SpO2 99%   BMI 29.60 kg/m²   General: well developed; well nourished  no acute distress   Abdomen: soft, non-tender; no masses  gravid    FHT's: reactive and category 1      Cervix: was checked (by RN): 0 cm / 50 % / Floating   Presentation: cephalic   Contractions: irregular   Chest: Unlabored respirations    CV:  RRR   Ext:   No C/C/E   Back: SI tenderness is absent bilateral        Prenatal Labs  Lab Results   Component Value Date    HGB 9.2 (L) 04/05/2024    RUBELLAABIGG 1.47 11/17/2023    HEPBSAG Negative 11/17/2023    ABORH A Negative 03/08/2023    ABSCRN Negative 03/20/2024    AGQ6TBN0 Non Reactive 11/17/2023    HEPCVIRUSABY Non Reactive 11/17/2023     03/19/2024    URINECX Final report 11/17/2023    CHLAMNAA Negative 11/17/2023    NGONORRHON Negative 11/17/2023       Current Labs Reviewed   UA:    Lab Results   Component Value Date    SQUAMEPIUA 3-6 (A) 04/30/2024    SPECGRAVUR 1.014 04/30/2024    KETONESU Trace (A) 04/30/2024    BLOODU Negative 04/30/2024    LEUKOCYTESUR Trace (A) 04/30/2024    NITRITEU Negative 04/30/2024    RBCUA 0-2 04/30/2024    WBCUA 6-10 (A) 04/30/2024    BACTERIA Trace (A) 04/30/2024          Assessment   IUP at 35w3d  Irregular CTX- no cervical dilation.   Lumbar pain- resolved with bed rest     Plan   D/C home with PTL precautions. Return to [facility] for worsening symptoms, acute changes. Keep regularly scheduled prenatal appointments.     Jaycee Owusu MD  4/30/2024  02:16 EDT     "

## 2024-05-01 LAB — BACTERIA SPEC AEROBE CULT: NORMAL

## 2024-05-08 ENCOUNTER — ROUTINE PRENATAL (OUTPATIENT)
Dept: OBSTETRICS AND GYNECOLOGY | Facility: CLINIC | Age: 29
End: 2024-05-08
Payer: COMMERCIAL

## 2024-05-08 VITALS — SYSTOLIC BLOOD PRESSURE: 113 MMHG | WEIGHT: 193 LBS | DIASTOLIC BLOOD PRESSURE: 72 MMHG | BODY MASS INDEX: 29.35 KG/M2

## 2024-05-08 DIAGNOSIS — Z3A.36 36 WEEKS GESTATION OF PREGNANCY: ICD-10-CM

## 2024-05-08 DIAGNOSIS — Z34.93 THIRD TRIMESTER PREGNANCY: Primary | ICD-10-CM

## 2024-05-12 LAB — B-HEM STREP SPEC QL CULT: NEGATIVE

## 2024-05-14 ENCOUNTER — HOSPITAL ENCOUNTER (EMERGENCY)
Facility: HOSPITAL | Age: 29
Discharge: HOME | End: 2024-05-14
Attending: OBSTETRICS & GYNECOLOGY | Admitting: OBSTETRICS & GYNECOLOGY
Payer: COMMERCIAL

## 2024-05-14 VITALS
TEMPERATURE: 98.2 F | BODY MASS INDEX: 28.91 KG/M2 | HEIGHT: 69 IN | SYSTOLIC BLOOD PRESSURE: 117 MMHG | RESPIRATION RATE: 18 BRPM | DIASTOLIC BLOOD PRESSURE: 65 MMHG | HEART RATE: 91 BPM | WEIGHT: 195.2 LBS

## 2024-05-14 LAB
BACTERIA UR QL AUTO: ABNORMAL /HPF
BILIRUB UR QL STRIP: NEGATIVE
CLARITY UR: CLEAR
COLOR UR: YELLOW
GLUCOSE UR STRIP-MCNC: NEGATIVE MG/DL
HGB UR QL STRIP.AUTO: NEGATIVE
HYALINE CASTS UR QL AUTO: ABNORMAL /LPF
KETONES UR QL STRIP: ABNORMAL
LEUKOCYTE ESTERASE UR QL STRIP.AUTO: ABNORMAL
NITRITE UR QL STRIP: NEGATIVE
PH UR STRIP.AUTO: 7 [PH] (ref 5–8)
PROT UR QL STRIP: ABNORMAL
RBC # UR STRIP: ABNORMAL /HPF
REF LAB TEST METHOD: ABNORMAL
SP GR UR STRIP: 1.03 (ref 1–1.03)
SQUAMOUS #/AREA URNS HPF: ABNORMAL /HPF
UROBILINOGEN UR QL STRIP: ABNORMAL
WBC # UR STRIP: ABNORMAL /HPF

## 2024-05-14 PROCEDURE — 99284 EMERGENCY DEPT VISIT MOD MDM: CPT | Performed by: OBSTETRICS & GYNECOLOGY

## 2024-05-14 PROCEDURE — 81001 URINALYSIS AUTO W/SCOPE: CPT | Performed by: OBSTETRICS & GYNECOLOGY

## 2024-05-14 PROCEDURE — 59025 FETAL NON-STRESS TEST: CPT

## 2024-05-14 PROCEDURE — 87086 URINE CULTURE/COLONY COUNT: CPT | Performed by: OBSTETRICS & GYNECOLOGY

## 2024-05-15 NOTE — OBED NOTES
[facility]  Osbaldo Ralph  : 1995  MRN: [id number]  CSN: [id number]    OB ED Provider Note    Subjective   Chief Complaint   Patient presents with    Contractions     Pt complaining contractions and cramping this afternoon with back pain and lower abd cramping. Pt has hx of fibroids.      Osbaldo Ralph is a 28 y.o. year old  with an Estimated Date of Delivery: 24 currently at 37w3d presenting with 2 week history of lower abdominal/pelvic pain, radiating into her vagina with accompanying bilateral LBP and abdominal cramping.  She reports the pain is worse with standing, ambulation, and sitting on a hard surface. She denies ROM or VB but is unsure if she is having CTX. No ROM. FM is present.    Prenatal care has been with Dr. Whitaker.  It has been complicated by Hx of DVT, non-compliant with Lovenox .    OB History    Para Term  AB Living   5 3 3 0 1 3   SAB IAB Ectopic Molar Multiple Live Births   1 0 0 0 0 3      # Outcome Date GA Lbr Wm/2nd Weight Sex Type Anes PTL Lv   5 Current            4 Term 23 38w1d / 00:39 2540 g (5 lb 9.6 oz) M Vag-Spont EPI  FELI      Name: DIONICIO RALPHONAL      Apgar1: 8  Apgar5: 9   3 Term 21 37w2d 17:24 / 00:25 2605 g (5 lb 11.9 oz) M Vag-Spont EPI N FELI      Name: DIONICIO RALPH  STEVONAL      Apgar1: 9  Apgar5: 9   2 Term 18 37w5d / 00:41 3005 g (6 lb 10 oz) M Vag-Spont EPI N FELI      Name: DIONICIO RALPHONAL      Apgar1: 8  Apgar5: 9   1 SAB         FD      Birth Comments: Suction     Past Medical History:   Diagnosis Date    Anemia     Chlamydia     Heart murmur     as a child    Hypoglycemia     Uterine fibroids affecting pregnancy, antepartum      Past Surgical History:   Procedure Laterality Date    DILATATION AND CURETTAGE      WISDOM TOOTH EXTRACTION       No current facility-administered medications for this encounter.    No Known Allergies  Social History    Tobacco Use      Smoking status: Former         "Types: Cigars        Start date:         Quit date: 2023        Years since quittin.6      Smokeless tobacco: Never      Tobacco comments: Smoke 2 cigars a day.    Review of Systems   Gastrointestinal:  Positive for abdominal pain.   Musculoskeletal:  Positive for back pain.   All other systems reviewed and are negative.        Objective   /65   Pulse 91   Temp 98.2 °F (36.8 °C) (Oral)   Resp 18   Ht 175.3 cm (69\")   Wt 88.5 kg (195 lb 3.2 oz)   LMP 2023 (Exact Date)   BMI 28.83 kg/m²   General: well developed; well nourished  mildly distressed   Abdomen: soft, 3+ symphyseal tenderness; no masses  gravid    FHT's: reactive and category 1      Cervix: was checked (by RN): 0.5 cm / 50 % / -3   Presentation: cephalic   Contractions: none   Chest: Unlabored respirations    CV:  RRR   Ext:   No C/C/E   Back: SI tenderness is present bilateral        Prenatal Labs  Lab Results   Component Value Date    HGB 9.2 (L) 2024    RUBELLAABIGG 1.47 2023    HEPBSAG Negative 2023    ABORH A Negative 2023    ABSCRN Negative 2024    JHI5DFJ2 Non Reactive 2023    HEPCVIRUSABY Non Reactive 2023     2024    STREPGPB Negative 2024    URINECX <25,000 CFU/mL Normal Urogenital Jo 2024    CHLAMNAA Negative 2023    NGONORRHON Negative 2023       Current Labs Reviewed   UA:    Lab Results   Component Value Date    SQUAMEPIUA 3-6 (A) 2024    SPECGRAVUR 1.026 2024    KETONESU Trace (A) 2024    BLOODU Negative 2024    LEUKOCYTESUR Small (1+) (A) 2024    NITRITEU Negative 2024    RBCUA 3-5 (A) 2024    WBCUA 3-5 (A) 2024    BACTERIA Trace (A) 2024          Assessment   IUP at 37w3d  Pelvic and low back pain- location, duration, exacerbating factors and physical findings favor MSK source from placental relaxin release. No evidence of labor.     Plan   D/C home with instructions to " obtain a trochanter belt to provide some symptomatic relief in the interval before delivery. Return for labor, worsening symptoms, acute changes. Keep regularly scheduled prenatal appointments.      Jaycee Owusu MD  5/14/2024  21:30 EDT

## 2024-05-16 LAB — BACTERIA SPEC AEROBE CULT: NO GROWTH

## 2024-05-17 ENCOUNTER — ROUTINE PRENATAL (OUTPATIENT)
Dept: OBSTETRICS AND GYNECOLOGY | Facility: CLINIC | Age: 29
End: 2024-05-17
Payer: COMMERCIAL

## 2024-05-17 VITALS — BODY MASS INDEX: 28.8 KG/M2 | DIASTOLIC BLOOD PRESSURE: 60 MMHG | SYSTOLIC BLOOD PRESSURE: 112 MMHG | WEIGHT: 195 LBS

## 2024-05-17 DIAGNOSIS — Z34.83 MULTIGRAVIDA IN THIRD TRIMESTER: Primary | ICD-10-CM

## 2024-05-17 DIAGNOSIS — O09.893 SHORT INTERVAL BETWEEN PREGNANCIES AFFECTING PREGNANCY IN THIRD TRIMESTER, ANTEPARTUM: ICD-10-CM

## 2024-05-17 DIAGNOSIS — Z3A.37 37 WEEKS GESTATION OF PREGNANCY: ICD-10-CM

## 2024-05-17 DIAGNOSIS — O99.019 MATERNAL ANEMIA IN PREGNANCY, ANTEPARTUM: ICD-10-CM

## 2024-05-17 DIAGNOSIS — O22.30 DVT (DEEP VEIN THROMBOSIS) IN PREGNANCY: ICD-10-CM

## 2024-05-17 NOTE — PROGRESS NOTES
Cc:  Pregnancy follow up.  Patient c/o ctxs.  These are intermittent, however.  She denies any current contractions.  No bleeding or spotting.  Fetal movement is good.  Patient reports that she has not taken her Lovenox for 3 months because she cannot remember to take it.  Vitals reviewed by me.  Gen - alert and pleasant.  Abdomen - gravid, nontender  Cervix - 50/1/-3  A/P:  IUP at 37 weeks with history of DVT, anemia  - DVT.  Patient reports that she has not taken LMWH in a long time and does not plan to resume.  Did discussed risks of thrombosis, especially at end of pregnancy and postpartum.  Discussed avoiding use of estrogen in future.  Patient initially desired sterilization but declines; she reports that FOB is incarcerated for extended period.  - Anemia.  Check CBC.

## 2024-05-20 ENCOUNTER — HOSPITAL ENCOUNTER (INPATIENT)
Facility: HOSPITAL | Age: 29
LOS: 2 days | Discharge: HOME | End: 2024-05-22
Attending: OBSTETRICS & GYNECOLOGY | Admitting: OBSTETRICS & GYNECOLOGY
Payer: COMMERCIAL

## 2024-05-20 ENCOUNTER — ANESTHESIA EVENT (OUTPATIENT)
Dept: LABOR AND DELIVERY | Facility: HOSPITAL | Age: 29
End: 2024-05-20
Payer: COMMERCIAL

## 2024-05-20 ENCOUNTER — ANESTHESIA (OUTPATIENT)
Dept: LABOR AND DELIVERY | Facility: HOSPITAL | Age: 29
End: 2024-05-20
Payer: COMMERCIAL

## 2024-05-20 DIAGNOSIS — K59.09 OTHER CONSTIPATION: ICD-10-CM

## 2024-05-20 PROBLEM — Z34.90 PREGNANCY: Status: RESOLVED | Noted: 2023-11-05 | Resolved: 2024-05-20

## 2024-05-20 LAB
ABO GROUP BLD: NORMAL
ALBUMIN SERPL-MCNC: 3.4 G/DL (ref 3.5–5.2)
ALBUMIN/GLOB SERPL: 1.2 G/DL
ALP SERPL-CCNC: 108 U/L (ref 39–117)
ALT SERPL W P-5'-P-CCNC: 6 U/L (ref 1–33)
ANION GAP SERPL CALCULATED.3IONS-SCNC: 11.1 MMOL/L (ref 5–15)
ANISOCYTOSIS BLD QL: ABNORMAL
AST SERPL-CCNC: 12 U/L (ref 1–32)
BASOPHILS # BLD MANUAL: 0 10*3/MM3 (ref 0–0.2)
BASOPHILS NFR BLD MANUAL: 0 % (ref 0–1.5)
BILIRUB SERPL-MCNC: 0.3 MG/DL (ref 0–1.2)
BLD GP AB SCN SERPL QL: POSITIVE
BUN SERPL-MCNC: 5 MG/DL (ref 6–20)
BUN/CREAT SERPL: 10.9 (ref 7–25)
CALCIUM SPEC-SCNC: 8.7 MG/DL (ref 8.6–10.5)
CHLORIDE SERPL-SCNC: 105 MMOL/L (ref 98–107)
CO2 SERPL-SCNC: 19.9 MMOL/L (ref 22–29)
CREAT SERPL-MCNC: 0.46 MG/DL (ref 0.57–1)
DEPRECATED RDW RBC AUTO: 42.9 FL (ref 37–54)
EGFRCR SERPLBLD CKD-EPI 2021: 132.2 ML/MIN/1.73
EOSINOPHIL # BLD MANUAL: 0 10*3/MM3 (ref 0–0.4)
EOSINOPHIL NFR BLD MANUAL: 0 % (ref 0.3–6.2)
ERYTHROCYTE [DISTWIDTH] IN BLOOD BY AUTOMATED COUNT: 16.2 % (ref 12.3–15.4)
GLOBULIN UR ELPH-MCNC: 2.9 GM/DL
GLUCOSE SERPL-MCNC: 85 MG/DL (ref 65–99)
HCT VFR BLD AUTO: 29.4 % (ref 34–46.6)
HGB BLD-MCNC: 9 G/DL (ref 12–15.9)
LYMPHOCYTES # BLD MANUAL: 1.65 10*3/MM3 (ref 0.7–3.1)
LYMPHOCYTES NFR BLD MANUAL: 7 % (ref 5–12)
MCH RBC QN AUTO: 22.7 PG (ref 26.6–33)
MCHC RBC AUTO-ENTMCNC: 30.6 G/DL (ref 31.5–35.7)
MCV RBC AUTO: 74.2 FL (ref 79–97)
MONOCYTES # BLD: 0.5 10*3/MM3 (ref 0.1–0.9)
NEUTROPHILS # BLD AUTO: 5.01 10*3/MM3 (ref 1.7–7)
NEUTROPHILS NFR BLD MANUAL: 70 % (ref 42.7–76)
PLAT MORPH BLD: NORMAL
PLATELET # BLD AUTO: 252 10*3/MM3 (ref 140–450)
PMV BLD AUTO: 10.4 FL (ref 6–12)
POTASSIUM SERPL-SCNC: 3.6 MMOL/L (ref 3.5–5.2)
PROT SERPL-MCNC: 6.3 G/DL (ref 6–8.5)
RBC # BLD AUTO: 3.96 10*6/MM3 (ref 3.77–5.28)
RESIDUAL RHIG DETECTED: NORMAL
RH BLD: NEGATIVE
SODIUM SERPL-SCNC: 136 MMOL/L (ref 136–145)
T PALLIDUM IGG SER QL: NORMAL
T&S EXPIRATION DATE: NORMAL
VARIANT LYMPHS NFR BLD MANUAL: 23 % (ref 19.6–45.3)
WBC MORPH BLD: NORMAL
WBC NRBC COR # BLD AUTO: 7.16 10*3/MM3 (ref 3.4–10.8)

## 2024-05-20 PROCEDURE — 25010000002 ROPIVACAINE PER 1 MG: Performed by: STUDENT IN AN ORGANIZED HEALTH CARE EDUCATION/TRAINING PROGRAM

## 2024-05-20 PROCEDURE — 86780 TREPONEMA PALLIDUM: CPT | Performed by: OBSTETRICS & GYNECOLOGY

## 2024-05-20 PROCEDURE — 59410 OBSTETRICAL CARE: CPT | Performed by: OBSTETRICS & GYNECOLOGY

## 2024-05-20 PROCEDURE — 99202 OFFICE O/P NEW SF 15 MIN: CPT | Performed by: OBSTETRICS & GYNECOLOGY

## 2024-05-20 PROCEDURE — 80053 COMPREHEN METABOLIC PANEL: CPT | Performed by: OBSTETRICS & GYNECOLOGY

## 2024-05-20 PROCEDURE — 85025 COMPLETE CBC W/AUTO DIFF WBC: CPT | Performed by: OBSTETRICS & GYNECOLOGY

## 2024-05-20 PROCEDURE — 25810000003 LACTATED RINGERS PER 1000 ML: Performed by: OBSTETRICS & GYNECOLOGY

## 2024-05-20 PROCEDURE — 86901 BLOOD TYPING SEROLOGIC RH(D): CPT | Performed by: OBSTETRICS & GYNECOLOGY

## 2024-05-20 PROCEDURE — 25010000002 DIPHENHYDRAMINE PER 50 MG: Performed by: ANESTHESIOLOGY

## 2024-05-20 PROCEDURE — 86850 RBC ANTIBODY SCREEN: CPT | Performed by: OBSTETRICS & GYNECOLOGY

## 2024-05-20 PROCEDURE — 25010000002 LIDOCAINE 1 % SOLUTION: Performed by: STUDENT IN AN ORGANIZED HEALTH CARE EDUCATION/TRAINING PROGRAM

## 2024-05-20 PROCEDURE — 86900 BLOOD TYPING SEROLOGIC ABO: CPT | Performed by: OBSTETRICS & GYNECOLOGY

## 2024-05-20 PROCEDURE — 86870 RBC ANTIBODY IDENTIFICATION: CPT | Performed by: OBSTETRICS & GYNECOLOGY

## 2024-05-20 PROCEDURE — C1755 CATHETER, INTRASPINAL: HCPCS | Performed by: STUDENT IN AN ORGANIZED HEALTH CARE EDUCATION/TRAINING PROGRAM

## 2024-05-20 PROCEDURE — 85007 BL SMEAR W/DIFF WBC COUNT: CPT | Performed by: OBSTETRICS & GYNECOLOGY

## 2024-05-20 PROCEDURE — 0KQM0ZZ REPAIR PERINEUM MUSCLE, OPEN APPROACH: ICD-10-PCS | Performed by: OBSTETRICS & GYNECOLOGY

## 2024-05-20 RX ORDER — ONDANSETRON 2 MG/ML
4 INJECTION INTRAMUSCULAR; INTRAVENOUS ONCE AS NEEDED
Status: DISCONTINUED | OUTPATIENT
Start: 2024-05-20 | End: 2024-05-20 | Stop reason: HOSPADM

## 2024-05-20 RX ORDER — CARBOPROST TROMETHAMINE 250 UG/ML
250 INJECTION, SOLUTION INTRAMUSCULAR ONCE AS NEEDED
Status: DISCONTINUED | OUTPATIENT
Start: 2024-05-20 | End: 2024-05-22 | Stop reason: HOSPADM

## 2024-05-20 RX ORDER — FAMOTIDINE 10 MG/ML
20 INJECTION, SOLUTION INTRAVENOUS 2 TIMES DAILY PRN
Status: DISCONTINUED | OUTPATIENT
Start: 2024-05-20 | End: 2024-05-20 | Stop reason: HOSPADM

## 2024-05-20 RX ORDER — PHYTONADIONE 1 MG/.5ML
INJECTION, EMULSION INTRAMUSCULAR; INTRAVENOUS; SUBCUTANEOUS
Status: ACTIVE
Start: 2024-05-20 | End: 2024-05-20

## 2024-05-20 RX ORDER — ONDANSETRON 2 MG/ML
4 INJECTION INTRAMUSCULAR; INTRAVENOUS EVERY 6 HOURS PRN
Status: DISCONTINUED | OUTPATIENT
Start: 2024-05-20 | End: 2024-05-20 | Stop reason: HOSPADM

## 2024-05-20 RX ORDER — ACETAMINOPHEN 325 MG/1
650 TABLET ORAL EVERY 6 HOURS PRN
Status: DISCONTINUED | OUTPATIENT
Start: 2024-05-20 | End: 2024-05-22 | Stop reason: HOSPADM

## 2024-05-20 RX ORDER — SODIUM CHLORIDE, SODIUM LACTATE, POTASSIUM CHLORIDE, CALCIUM CHLORIDE 600; 310; 30; 20 MG/100ML; MG/100ML; MG/100ML; MG/100ML
125 INJECTION, SOLUTION INTRAVENOUS CONTINUOUS
Status: DISCONTINUED | OUTPATIENT
Start: 2024-05-20 | End: 2024-05-20

## 2024-05-20 RX ORDER — FENTANYL/ROPIVACAINE/NS/PF 2MCG/ML-.2
10 PLASTIC BAG, INJECTION (ML) INJECTION CONTINUOUS
Status: DISCONTINUED | OUTPATIENT
Start: 2024-05-20 | End: 2024-05-20

## 2024-05-20 RX ORDER — ACETAMINOPHEN 325 MG/1
650 TABLET ORAL EVERY 4 HOURS PRN
Status: DISCONTINUED | OUTPATIENT
Start: 2024-05-20 | End: 2024-05-20 | Stop reason: HOSPADM

## 2024-05-20 RX ORDER — METHYLERGONOVINE MALEATE 0.2 MG/ML
200 INJECTION INTRAVENOUS ONCE AS NEEDED
Status: DISCONTINUED | OUTPATIENT
Start: 2024-05-20 | End: 2024-05-22 | Stop reason: HOSPADM

## 2024-05-20 RX ORDER — TRAMADOL HYDROCHLORIDE 50 MG/1
50 TABLET ORAL EVERY 6 HOURS PRN
Status: DISCONTINUED | OUTPATIENT
Start: 2024-05-20 | End: 2024-05-22 | Stop reason: HOSPADM

## 2024-05-20 RX ORDER — SODIUM CHLORIDE 9 MG/ML
40 INJECTION, SOLUTION INTRAVENOUS AS NEEDED
Status: DISCONTINUED | OUTPATIENT
Start: 2024-05-20 | End: 2024-05-20 | Stop reason: HOSPADM

## 2024-05-20 RX ORDER — SODIUM CHLORIDE 0.9 % (FLUSH) 0.9 %
1-10 SYRINGE (ML) INJECTION AS NEEDED
Status: DISCONTINUED | OUTPATIENT
Start: 2024-05-20 | End: 2024-05-22 | Stop reason: HOSPADM

## 2024-05-20 RX ORDER — METHYLERGONOVINE MALEATE 0.2 MG/ML
200 INJECTION INTRAVENOUS ONCE AS NEEDED
Status: DISCONTINUED | OUTPATIENT
Start: 2024-05-20 | End: 2024-05-20 | Stop reason: HOSPADM

## 2024-05-20 RX ORDER — MISOPROSTOL 200 UG/1
600 TABLET ORAL ONCE AS NEEDED
Status: DISCONTINUED | OUTPATIENT
Start: 2024-05-20 | End: 2024-05-22 | Stop reason: HOSPADM

## 2024-05-20 RX ORDER — OXYTOCIN/0.9 % SODIUM CHLORIDE 30/500 ML
2-20 PLASTIC BAG, INJECTION (ML) INTRAVENOUS
Status: DISCONTINUED | OUTPATIENT
Start: 2024-05-20 | End: 2024-05-20 | Stop reason: HOSPADM

## 2024-05-20 RX ORDER — BISACODYL 10 MG
10 SUPPOSITORY, RECTAL RECTAL DAILY PRN
Status: DISCONTINUED | OUTPATIENT
Start: 2024-05-21 | End: 2024-05-22 | Stop reason: HOSPADM

## 2024-05-20 RX ORDER — LIDOCAINE HYDROCHLORIDE 10 MG/ML
INJECTION, SOLUTION INFILTRATION; PERINEURAL AS NEEDED
Status: DISCONTINUED | OUTPATIENT
Start: 2024-05-20 | End: 2024-05-20 | Stop reason: SURG

## 2024-05-20 RX ORDER — ONDANSETRON 4 MG/1
4 TABLET, ORALLY DISINTEGRATING ORAL EVERY 6 HOURS PRN
Status: DISCONTINUED | OUTPATIENT
Start: 2024-05-20 | End: 2024-05-20 | Stop reason: HOSPADM

## 2024-05-20 RX ORDER — MAGNESIUM CARB/ALUMINUM HYDROX 105-160MG
30 TABLET,CHEWABLE ORAL ONCE AS NEEDED
Status: DISCONTINUED | OUTPATIENT
Start: 2024-05-20 | End: 2024-05-20 | Stop reason: HOSPADM

## 2024-05-20 RX ORDER — NALOXONE HCL 0.4 MG/ML
0.4 VIAL (ML) INJECTION
Status: DISCONTINUED | OUTPATIENT
Start: 2024-05-20 | End: 2024-05-20 | Stop reason: HOSPADM

## 2024-05-20 RX ORDER — LIDOCAINE HYDROCHLORIDE 10 MG/ML
0.5 INJECTION, SOLUTION INFILTRATION; PERINEURAL ONCE AS NEEDED
Status: DISCONTINUED | OUTPATIENT
Start: 2024-05-20 | End: 2024-05-20 | Stop reason: HOSPADM

## 2024-05-20 RX ORDER — MISOPROSTOL 200 UG/1
800 TABLET ORAL ONCE AS NEEDED
Status: DISCONTINUED | OUTPATIENT
Start: 2024-05-20 | End: 2024-05-20 | Stop reason: HOSPADM

## 2024-05-20 RX ORDER — CALCIUM CARBONATE 500 MG/1
2 TABLET, CHEWABLE ORAL 3 TIMES DAILY PRN
Status: DISCONTINUED | OUTPATIENT
Start: 2024-05-20 | End: 2024-05-22 | Stop reason: HOSPADM

## 2024-05-20 RX ORDER — ROPIVACAINE HYDROCHLORIDE 2 MG/ML
INJECTION, SOLUTION EPIDURAL; INFILTRATION; PERINEURAL AS NEEDED
Status: DISCONTINUED | OUTPATIENT
Start: 2024-05-20 | End: 2024-05-20 | Stop reason: SURG

## 2024-05-20 RX ORDER — EPHEDRINE SULFATE 50 MG/ML
5 INJECTION, SOLUTION INTRAVENOUS
Status: DISCONTINUED | OUTPATIENT
Start: 2024-05-20 | End: 2024-05-20 | Stop reason: HOSPADM

## 2024-05-20 RX ORDER — TRANEXAMIC ACID 10 MG/ML
1000 INJECTION, SOLUTION INTRAVENOUS ONCE AS NEEDED
Status: DISCONTINUED | OUTPATIENT
Start: 2024-05-20 | End: 2024-05-20

## 2024-05-20 RX ORDER — TERBUTALINE SULFATE 1 MG/ML
0.25 INJECTION, SOLUTION SUBCUTANEOUS AS NEEDED
Status: DISCONTINUED | OUTPATIENT
Start: 2024-05-20 | End: 2024-05-20 | Stop reason: HOSPADM

## 2024-05-20 RX ORDER — TRANEXAMIC ACID 10 MG/ML
1000 INJECTION, SOLUTION INTRAVENOUS ONCE AS NEEDED
Status: DISCONTINUED | OUTPATIENT
Start: 2024-05-20 | End: 2024-05-22 | Stop reason: HOSPADM

## 2024-05-20 RX ORDER — IBUPROFEN 600 MG/1
600 TABLET ORAL EVERY 6 HOURS PRN
Status: DISCONTINUED | OUTPATIENT
Start: 2024-05-20 | End: 2024-05-22 | Stop reason: HOSPADM

## 2024-05-20 RX ORDER — OXYTOCIN/0.9 % SODIUM CHLORIDE 30/500 ML
999 PLASTIC BAG, INJECTION (ML) INTRAVENOUS ONCE
Status: DISCONTINUED | OUTPATIENT
Start: 2024-05-20 | End: 2024-05-20 | Stop reason: HOSPADM

## 2024-05-20 RX ORDER — SODIUM CHLORIDE 0.9 % (FLUSH) 0.9 %
10 SYRINGE (ML) INJECTION EVERY 12 HOURS SCHEDULED
Status: DISCONTINUED | OUTPATIENT
Start: 2024-05-20 | End: 2024-05-20 | Stop reason: HOSPADM

## 2024-05-20 RX ORDER — PRENATAL VIT/IRON FUM/FOLIC AC 27MG-0.8MG
1 TABLET ORAL DAILY
Status: DISCONTINUED | OUTPATIENT
Start: 2024-05-20 | End: 2024-05-22 | Stop reason: HOSPADM

## 2024-05-20 RX ORDER — DOCUSATE SODIUM 100 MG/1
100 CAPSULE, LIQUID FILLED ORAL 2 TIMES DAILY
Status: DISCONTINUED | OUTPATIENT
Start: 2024-05-20 | End: 2024-05-22 | Stop reason: HOSPADM

## 2024-05-20 RX ORDER — SODIUM CHLORIDE 0.9 % (FLUSH) 0.9 %
10 SYRINGE (ML) INJECTION AS NEEDED
Status: DISCONTINUED | OUTPATIENT
Start: 2024-05-20 | End: 2024-05-20 | Stop reason: HOSPADM

## 2024-05-20 RX ORDER — OXYTOCIN/0.9 % SODIUM CHLORIDE 30/500 ML
125 PLASTIC BAG, INJECTION (ML) INTRAVENOUS ONCE AS NEEDED
Status: COMPLETED | OUTPATIENT
Start: 2024-05-20 | End: 2024-05-20

## 2024-05-20 RX ORDER — DIPHENHYDRAMINE HYDROCHLORIDE 50 MG/ML
12.5 INJECTION INTRAMUSCULAR; INTRAVENOUS EVERY 8 HOURS PRN
Status: DISCONTINUED | OUTPATIENT
Start: 2024-05-20 | End: 2024-05-20 | Stop reason: HOSPADM

## 2024-05-20 RX ORDER — OXYTOCIN/0.9 % SODIUM CHLORIDE 30/500 ML
250 PLASTIC BAG, INJECTION (ML) INTRAVENOUS CONTINUOUS
Status: DISPENSED | OUTPATIENT
Start: 2024-05-20 | End: 2024-05-20

## 2024-05-20 RX ORDER — ERYTHROMYCIN 5 MG/G
OINTMENT OPHTHALMIC
Status: ACTIVE
Start: 2024-05-20 | End: 2024-05-20

## 2024-05-20 RX ORDER — CARBOPROST TROMETHAMINE 250 UG/ML
250 INJECTION, SOLUTION INTRAMUSCULAR
Status: DISCONTINUED | OUTPATIENT
Start: 2024-05-20 | End: 2024-05-20 | Stop reason: HOSPADM

## 2024-05-20 RX ORDER — FAMOTIDINE 20 MG/1
20 TABLET, FILM COATED ORAL 2 TIMES DAILY PRN
Status: DISCONTINUED | OUTPATIENT
Start: 2024-05-20 | End: 2024-05-20 | Stop reason: HOSPADM

## 2024-05-20 RX ORDER — MORPHINE SULFATE 2 MG/ML
2 INJECTION, SOLUTION INTRAMUSCULAR; INTRAVENOUS EVERY 4 HOURS PRN
Status: DISCONTINUED | OUTPATIENT
Start: 2024-05-20 | End: 2024-05-20 | Stop reason: HOSPADM

## 2024-05-20 RX ORDER — HYDROCORTISONE 25 MG/G
CREAM TOPICAL AS NEEDED
Status: DISCONTINUED | OUTPATIENT
Start: 2024-05-20 | End: 2024-05-22 | Stop reason: HOSPADM

## 2024-05-20 RX ORDER — ONDANSETRON 2 MG/ML
4 INJECTION INTRAMUSCULAR; INTRAVENOUS EVERY 6 HOURS PRN
Status: DISCONTINUED | OUTPATIENT
Start: 2024-05-20 | End: 2024-05-22 | Stop reason: HOSPADM

## 2024-05-20 RX ADMIN — Medication 1 TABLET: at 17:01

## 2024-05-20 RX ADMIN — DIPHENHYDRAMINE HYDROCHLORIDE 12.5 MG: 50 INJECTION, SOLUTION INTRAMUSCULAR; INTRAVENOUS at 11:35

## 2024-05-20 RX ADMIN — Medication 10 ML/HR: at 07:19

## 2024-05-20 RX ADMIN — ACETAMINOPHEN 650 MG: 325 TABLET, FILM COATED ORAL at 10:42

## 2024-05-20 RX ADMIN — Medication 10 ML: at 13:13

## 2024-05-20 RX ADMIN — TRAMADOL HYDROCHLORIDE 50 MG: 50 TABLET ORAL at 22:16

## 2024-05-20 RX ADMIN — IBUPROFEN 600 MG: 600 TABLET, FILM COATED ORAL at 13:46

## 2024-05-20 RX ADMIN — Medication 2 MILLI-UNITS/MIN: at 09:45

## 2024-05-20 RX ADMIN — SODIUM CHLORIDE, POTASSIUM CHLORIDE, SODIUM LACTATE AND CALCIUM CHLORIDE 125 ML/HR: 600; 310; 30; 20 INJECTION, SOLUTION INTRAVENOUS at 12:19

## 2024-05-20 RX ADMIN — ROPIVACAINE HYDROCHLORIDE 5 ML: 2 INJECTION, SOLUTION EPIDURAL; INFILTRATION at 07:17

## 2024-05-20 RX ADMIN — ACETAMINOPHEN 650 MG: 325 TABLET, FILM COATED ORAL at 17:01

## 2024-05-20 RX ADMIN — Medication: at 17:02

## 2024-05-20 RX ADMIN — SODIUM CHLORIDE, POTASSIUM CHLORIDE, SODIUM LACTATE AND CALCIUM CHLORIDE 125 ML/HR: 600; 310; 30; 20 INJECTION, SOLUTION INTRAVENOUS at 08:05

## 2024-05-20 RX ADMIN — DOCUSATE SODIUM 100 MG: 100 CAPSULE, LIQUID FILLED ORAL at 22:16

## 2024-05-20 RX ADMIN — IBUPROFEN 600 MG: 600 TABLET, FILM COATED ORAL at 19:32

## 2024-05-20 RX ADMIN — Medication 125 ML/HR: at 14:06

## 2024-05-20 RX ADMIN — LIDOCAINE HYDROCHLORIDE 4 ML: 10; .005 INJECTION, SOLUTION EPIDURAL; INFILTRATION; INTRACAUDAL; PERINEURAL at 07:12

## 2024-05-20 RX ADMIN — LIDOCAINE HYDROCHLORIDE 2 ML: 10 INJECTION, SOLUTION INFILTRATION; PERINEURAL at 07:06

## 2024-05-20 NOTE — OBED NOTES
[facility] Note OB        Patient Name: Osbaldo Ralph  YOB: 1995  MRN: [id number]  Admission Date: 2024  4:57 AM  Date of Service: 2024    Chief Complaint: Contractions (Patient presents to [facility] with c/o of contractions starting at 0200 rating them 9 out of 10. Patient states positive fetal movement. Denies vaginal bleeding )        Subjective     Osbaldo Ralph is a 28 y.o. female  at 38w2d with Estimated Date of Delivery: 24 who presents with the chief complaint listed above.  She has been having contractions since 0200 this morning that she rates 9/10.  She denies leaking fluid or vaginal bleeding.  She feels fetal movement.  She sees Davin Whitaker,* for her prenatal care. Her pregnancy has been complicated by:  history of DVT,  has not been using prescribed Lovenox at home.    She describes fetal movement as normal.  She denies rupture of membranes.  She denies vaginal bleeding. She is feeling contractions.          Objective   Patient Active Problem List    Diagnosis     *Pregnancy [Z34.90]     Hyperemesis gravidarum [O21.0]         OB History    Para Term  AB Living   5 3 3 0 1 3   SAB IAB Ectopic Molar Multiple Live Births   1 0 0 0 0 3      # Outcome Date GA Lbr Wm/2nd Weight Sex Type Anes PTL Lv   5 Current            4 Term 23 38w1d / 00:39 2540 g (5 lb 9.6 oz) M Vag-Spont EPI  FELI      Name: DIONICIO RALPHONAL      Apgar1: 8  Apgar5: 9   3 Term 21 37w2d 17:24 / 00:25 2605 g (5 lb 11.9 oz) M Vag-Spont EPI N FELI      Name: DIONICIO RALPHONAL      Apgar1: 9  Apgar5: 9   2 Term 18 37w5d / 00:41 3005 g (6 lb 10 oz) M Vag-Spont EPI N FELI      Name: DIONICIO RALPHONAL      Apgar1: 8  Apgar5: 9   1 2015        FD      Birth Comments: Suction        Past Medical History:   Diagnosis Date    Anemia     Chlamydia     Heart murmur     as a child    Hypoglycemia     Uterine fibroids affecting pregnancy, antepartum        Past  Surgical History:   Procedure Laterality Date    DILATATION AND CURETTAGE      WISDOM TOOTH EXTRACTION         No current facility-administered medications on file prior to encounter.     Current Outpatient Medications on File Prior to Encounter   Medication Sig Dispense Refill    ferrous sulfate 325 (65 FE) MG tablet Take 1 tablet by mouth Daily With Breakfast. 30 tablet 2    docusate sodium (Colace) 100 MG capsule Take 1 capsule by mouth 2 (Two) Times a Day. (Patient not taking: Reported on 2024) 60 capsule 1       No Known Allergies    Family History   Problem Relation Age of Onset    Hypertension Mother     Diabetes Mother     No Known Problems Father     Asthma Sister     No Known Problems Sister     No Known Problems Sister     No Known Problems Sister     Asthma Brother     No Known Problems Brother     No Known Problems Maternal Grandmother     Cancer Maternal Grandfather     Breast cancer Paternal Grandmother     No Known Problems Paternal Grandfather     Cancer Other         Breat    Diabetes Other     Hypertension Other     Anesthesia problems Neg Hx     Broken bones Neg Hx     Clotting disorder Neg Hx     Collagen disease Neg Hx     Dislocations Neg Hx     Osteoporosis Neg Hx     Rheumatologic disease Neg Hx     Scoliosis Neg Hx     Severe sprains Neg Hx        Social History     Socioeconomic History    Marital status: Single    Number of children: 3    Highest education level: High school graduate   Tobacco Use    Smoking status: Former     Types: Cigars     Start date:      Quit date: 2023     Years since quittin.6     Passive exposure: Never    Smokeless tobacco: Never    Tobacco comments:     Smoke 2 cigars a day.   Vaping Use    Vaping status: Never Used   Substance and Sexual Activity    Alcohol use: Not Currently    Drug use: Not Currently    Sexual activity: Yes     Birth control/protection: None           Review of Systems   Constitutional: Negative.    HENT: Negative.    "  Respiratory: Negative.     Cardiovascular: Negative.    Gastrointestinal:  Positive for abdominal pain.   Genitourinary: Negative.    Musculoskeletal:  Positive for back pain.   Neurological: Negative.           PHYSICAL EXAM:      VITAL SIGNS:  Vitals:    24 0514   BP: 129/80   BP Location: Right arm   Patient Position: Sitting   Pulse: 83   Resp: 20   Temp: 98.4 °F (36.9 °C)   TempSrc: Oral   SpO2: 100%   Height: 175.3 cm (69.02\")        FHT:                   Baseline:  120 BPM  Variability:  Moderate = 6 - 25 BPM  Accelerations:  15 x 15 accelerations present     Decelerations:  absent  Contractions:   present     Interpretation:    Reactive NST, CAT 1 tracing        PHYSICAL EXAM:    General: well developed; well nourished   Heart: Not performed.   Lungs  : breathing is unlabored     Abdomen: soft, non-tender; no masses       Cervix: was checked (by RN): 3-4 cm / 70 % / -2  Cervical Dilation (cm): 3-4  Cervical Effacement: 70%  Fetal Station: -2  Cervical Consistency: soft  Cervical Position: mid-position   Contractions: every 3-5 minutes          Extremities: peripheral pulses normal, no pedal edema, no clubbing or cyanosis      LABS AND TESTING ORDERED:  Uterine and fetal monitoring  Urinalysis      LAB RESULTS:    No results found for this or any previous visit (from the past 24 hour(s)).    Lab Results   Component Value Date    ABO A 2024    RH Negative 2024       Lab Results   Component Value Date    STREPGPB Negative 2024                 Assessment & Plan     ASSESSMENT/PLAN:  Osbaldo Mayo is a 28 y.o. female  at 38w2d who presented with: labor          Final Impression:  Pregnancy at 38w2d  Reactive NST.  CAT 1 tracing  Labor  History of DVT  Maternal vital signs were reviewed and were unremarkable              Vitals:    24 0514   BP: 129/80   BP Location: Right arm   Patient Position: Sitting   Pulse: 83   Resp: 20   Temp: 98.4 °F (36.9 °C)   TempSrc: Oral " "  SpO2: 100%   Height: 175.3 cm (69.02\")       Lab Results   Component Value Date    STREPGPB Negative 05/08/2024     Lab Results   Component Value Date    ABO A 03/20/2024    RH Negative 03/20/2024         PLAN:     Admit for labor management  Patient would like an epidural      I have spent 15 minutes including face to face time with the patient, greater than 50% in discussion of the diagnosis (counseling) and/or coordination of care.     Venice Kang MD  5/20/2024  05:39 EDT  OB Hospitalist  Phone:  [phone]  "

## 2024-05-20 NOTE — PLAN OF CARE
Problem: Adult Inpatient Plan of Care  Goal: Plan of Care Review  Outcome: Ongoing, Progressing  Flowsheets  Taken 5/20/2024 1714 by Earline Grey RN  Progress: improving  Outcome Evaluation: VSS. Ambulated to bathroom x1, voiding spontaneously. Fundus firm and midline, bleeding light. Breastfeeding going well, spoke with LC this afternoon. Pain controlled with PRN motrin and tylenol. Bonding appropriately with infant. No new concerns at this time.  Taken 5/20/2024 1422 by Yajaira Cantu RN  Plan of Care Reviewed With:   patient   family  Goal: Patient-Specific Goal (Individualized)  Outcome: Ongoing, Progressing  Goal: Absence of Hospital-Acquired Illness or Injury  Outcome: Ongoing, Progressing  Intervention: Identify and Manage Fall Risk  Recent Flowsheet Documentation  Taken 5/20/2024 1655 by Earline Grey RN  Safety Promotion/Fall Prevention: safety round/check completed  Taken 5/20/2024 1520 by Earline Grey RN  Safety Promotion/Fall Prevention: safety round/check completed  Intervention: Prevent Skin Injury  Recent Flowsheet Documentation  Taken 5/20/2024 1520 by Earline Grey RN  Body Position:   sitting up in bed   position changed independently  Intervention: Prevent and Manage VTE (Venous Thromboembolism) Risk  Recent Flowsheet Documentation  Taken 5/20/2024 1655 by Earline Grey RN  Activity Management: ambulated to bathroom  Taken 5/20/2024 1520 by Earline Grey RN  Activity Management: bedrest  Goal: Optimal Comfort and Wellbeing  Outcome: Ongoing, Progressing  Intervention: Monitor Pain and Promote Comfort  Recent Flowsheet Documentation  Taken 5/20/2024 1701 by Earline Grey RN  Pain Management Interventions: see MAR  Intervention: Provide Person-Centered Care  Recent Flowsheet Documentation  Taken 5/20/2024 1520 by Earline Grey RN  Trust Relationship/Rapport:   care explained   choices provided  Goal: Readiness for Transition of  Care  Outcome: Ongoing, Progressing     Problem: Skin Injury Risk Increased  Goal: Skin Health and Integrity  Outcome: Ongoing, Progressing     Problem: Device-Related Complication Risk (Anesthesia/Analgesia, Neuraxial)  Goal: Safe Infusion Delivery Completion  Outcome: Ongoing, Progressing     Problem: Infection (Anesthesia/Analgesia, Neuraxial)  Goal: Absence of Infection Signs and Symptoms  Outcome: Ongoing, Progressing     Problem: Nausea and Vomiting (Anesthesia/Analgesia, Neuraxial)  Goal: Nausea and Vomiting Relief  Outcome: Ongoing, Progressing     Problem: Pain (Anesthesia/Analgesia, Neuraxial)  Goal: Effective Pain Control  Outcome: Ongoing, Progressing  Intervention: Prevent or Manage Pain  Recent Flowsheet Documentation  Taken 2024 1701 by Earline Grey RN  Pain Management Interventions: see MAR     Problem: Respiratory Compromise (Anesthesia/Analgesia, Neuraxial)  Goal: Effective Oxygenation and Ventilation  Outcome: Ongoing, Progressing     Problem: Sensorimotor Impairment (Anesthesia/Analgesia, Neuraxial)  Goal: Baseline Motor Function  Outcome: Ongoing, Progressing  Intervention: Optimize Sensorimotor Function  Recent Flowsheet Documentation  Taken 2024 1655 by Earline Grey RN  Safety Promotion/Fall Prevention: safety round/check completed  Taken 2024 1520 by Earline Grey RN  Safety Promotion/Fall Prevention: safety round/check completed     Problem: Urinary Retention (Anesthesia/Analgesia, Neuraxial)  Goal: Effective Urinary Elimination  Outcome: Ongoing, Progressing     Problem:  Fall Injury Risk  Goal: Absence of Fall, Infant Drop and Related Injury  Outcome: Ongoing, Progressing  Intervention: Promote Injury-Free Environment  Recent Flowsheet Documentation  Taken 2024 1655 by Earline Grey RN  Safety Promotion/Fall Prevention: safety round/check completed  Taken 2024 1520 by Earline Grey RN  Safety Promotion/Fall Prevention:  safety round/check completed     Problem: Adjustment to Role Transition (Postpartum Vaginal Delivery)  Goal: Successful Maternal Role Transition  Outcome: Ongoing, Progressing     Problem: Bleeding (Postpartum Vaginal Delivery)  Goal: Hemostasis  Outcome: Ongoing, Progressing     Problem: Infection (Postpartum Vaginal Delivery)  Goal: Absence of Infection Signs/Symptoms  Outcome: Ongoing, Progressing  Intervention: Prevent or Manage Infection  Recent Flowsheet Documentation  Taken 5/20/2024 1655 by Earline Grey RN  Perineal Care:   absorbent brief/pad changed   cold pack/ice pack applied   medicated pads applied   perineal hygiene encouraged   perineal spray bottle/warm water use encouraged   perineum cleansed     Problem: Pain (Postpartum Vaginal Delivery)  Goal: Acceptable Pain Control  Outcome: Ongoing, Progressing  Intervention: Prevent or Manage Pain  Recent Flowsheet Documentation  Taken 5/20/2024 1701 by Earline Grey RN  Pain Management Interventions: see MAR     Problem: Urinary Retention (Postpartum Vaginal Delivery)  Goal: Effective Urinary Elimination  Outcome: Ongoing, Progressing   Goal Outcome Evaluation:           Progress: improving  Outcome Evaluation: VSS. Ambulated to bathroom x1, voiding spontaneously. Fundus firm and midline, bleeding light. Breastfeeding going well, spoke with LC this afternoon. Pain controlled with PRN motrin and tylenol. Bonding appropriately with infant. No new concerns at this time.

## 2024-05-20 NOTE — H&P
[facility]   HISTORY AND PHYSICAL    Patient Name: Osbaldo Mayo  : 1995  MRN: [id number]  Primary Care Physician:  Provider, No Known  Date of admission: 2024    Subjective   Subjective     Chief Complaint: Contractions    HPI:    Osbaldo Mayo is a 28 y.o. female presented with complaints of contractions about 2 AM.  She had reported them and initially 9/10 out of intensity.  She was admitted during the night and has subsequently received an epidural.  She reports that she is comfortable now.  She denies vaginal bleeding or leakage of fluid.    She has a history of 3 prior vaginal deliveries at term without significant complications.  This pregnancy has been complicated by hyperemesis gravidarum which has been refractory to treatment.  She had required a PICC line and TPN, and subsequently developed an upper extremity DVT.  She has not been compliant with Lovenox as prescribed by her primary OB/GYN, Dr. Whitaker.        # 1 - Date: , Sex: None, Weight: None, GA: None, Type: None, Apgar1: None, Apgar5: None, Living: Fetal Demise, Birth Comments: Suction    # 2 - Date: 18, Sex: Male, Weight: 3005 g (6 lb 10 oz), GA: 37w5d, Type: Vaginal, Spontaneous, Apgar1: 8, Apgar5: 9, Living: Living, Birth Comments: None    # 3 - Date: 21, Sex: Male, Weight: 2605 g (5 lb 11.9 oz), GA: 37w2d, Type: Vaginal, Spontaneous, Apgar1: 9, Apgar5: 9, Living: Living, Birth Comments: None    # 4 - Date: 23, Sex: Male, Weight: 2540 g (5 lb 9.6 oz), GA: 38w1d, Type: Vaginal, Spontaneous, Apgar1: 8, Apgar5: 9, Living: Living, Birth Comments: None    # 5 - Date: None, Sex: None, Weight: None, GA: None, Type: None, Apgar1: None, Apgar5: None, Living: None, Birth Comments: None      Review of Systems    Constitutional: No fevers, chills, sweats   Eye: No recent visual problems, denies blurry vision   HEENT: No ear pain, nasal congestion, sore throat, voice changes   Respiratory: No shortness of breath,  cough, pain on breathing   Cardiovascular: No Chest pain, palpitations   Gastrointestinal: No nausea, vomiting, diarrhea, constipation   Genitourinary: No hematuria, dysuria, lesions on genitalia   Hema/Lymph: Negative for bruising, no edema   Endocrine: Negative for excessive thirst, excessive hunger, heat or cold intolerance   Musculoskeletal: No joint pain, muscle pain, decreased range of motion   Integumentary: No rash, pruritus, abrasions, lesions   Neurologic: No weakness, numbness, headaches   Psychiatric: No anxiety, depression, mood changes         Personal History     Past Medical History:   Diagnosis Date    Anemia     Chlamydia     years ago not during current pregnancy    Heart murmur     as a child    History of maternal deep vein thrombosis (DVT)     while pt hospitalized and had picc line during pregnancy    Hypoglycemia     Uterine fibroids affecting pregnancy, antepartum        Past Surgical History:   Procedure Laterality Date    DILATATION AND CURETTAGE      WISDOM TOOTH EXTRACTION         Family History: family history includes Asthma in her brother and sister; Breast cancer in her paternal grandmother; Cancer in her maternal grandfather and another family member; Diabetes in her mother and another family member; Hypertension in her mother and another family member; No Known Problems in her brother, father, maternal grandmother, paternal grandfather, sister, sister, and sister. Otherwise pertinent FHx was reviewed and not pertinent to current issue.    Social History:  reports that she quit smoking about 8 months ago. Her smoking use included cigars. She started smoking about 7 years ago. She has never been exposed to tobacco smoke. She has never used smokeless tobacco. She reports that she does not currently use alcohol. She reports that she does not currently use drugs.    Home Medications:  Polyethylene Glycol 3350, docusate sodium, and ferrous sulfate      Allergies:  No Known  Allergies    Objective   Objective     Vitals:   Temp:  [98.4 °F (36.9 °C)] 98.4 °F (36.9 °C)  Heart Rate:  [70-83] 70  Resp:  [18-20] 18  BP: (102-129)/(57-80) 106/57  Physical Exam     General: No acute distress, awake and oriented x3   HEENT: Normocephalic atraumatic, moist mucous membranes   Eyes: Pupils equal round reactive to light and accommodation, extraocular muscles intact   Respiratory: Normal work of breathing, good air movement   Cardiovascular: Regular rate and rhythm, no murmurs   Abdomen: Gravid, soft, nontender   Pelvic exam performed in the presence of female RN chaperone, Yajaira.  Patient's family member also present during the exam.  Patient provided verbal consent to proceed with the exam.   Pelvic exam: Normal external female genitalia, no lesions, no leakage of fluid   Cervix: 4 cm dilated/70% effaced/-2.  Cephalic by palpation of sutures   Artificial rupture membranes was performed with clear fluid noted.  An IUPC was placed and is working well.  Extremities: No calf tenderness, no lower extremity edema   Psychiatric: Good judgment and insight, normal affect and mood   Neurologic: Cranial nerves II through XII intact, no deficits   Skin: No rashes or lesions       Result Review    Result Review:  I have personally reviewed the results from the time of this admission to 5/20/2024 09:20 EDT and agree with these findings:  [x]  Laboratory list / accordion  [x]  Microbiology  [x]  Radiology  []  EKG/Telemetry   []  Cardiology/Vascular   []  Pathology  []  Old records  []  Other:  Most notable findings include:     Lab Results   Component Value Date    WBC 7.16 05/20/2024    HGB 9.0 (L) 05/20/2024    HCT 29.4 (L) 05/20/2024    MCV 74.2 (L) 05/20/2024     05/20/2024     Lab Results   Component Value Date    GLUCOSE 85 05/20/2024    BUN 5 (L) 05/20/2024    CREATININE 0.46 (L) 05/20/2024    EGFR 132.2 05/20/2024    BCR 10.9 05/20/2024    K 3.6 05/20/2024    CO2 19.9 (L) 05/20/2024    CALCIUM  8.7 2024    ALBUMIN 3.4 (L) 2024    BILITOT 0.3 2024    AST 12 2024    ALT 6 2024         Component  Ref Range & Units    Strep Gp B Culture  Negative Negative            US (24):  INDICATION: for fetal growth, size less than dates  BPD: 8.43 cm  HC: 31.68 cm  AC: 27.63 cm, 43%ile  FL: 6.28 cm  OVERALL: 57.0%  EFW: 1990g, 4lb6oz  CHUCK: 9.46 cm  PRESENTATION: vertex  PLACENTA: posterior    NST:  125/reactive/moderate variability/no decelerations  Tocometry: Contractions difficult to monitor    Assessment & Plan   Assessment / Plan     Brief Patient Summary:  Osbaldo Mayo is a 28 y.o. female  5 para 3-0-1-3 at 38-2/7 weeks gestational age in active spontaneous labor  2.  History of upper extremity DVT, not compliant with pharmacologic anticoagulation  3.  Anemia of pregnancy, asymptomatic  4.  GBS negative  5.  Fetal heart tones category 1    Active Hospital Problems:  Active Hospital Problems    Diagnosis     **Pregnancy      Plan:   1.  Patient admitted for active labor at term.  Recommend labor augmentation as her contractions appear to be spacing out.  Status post rupture of membranes.  May start Pitocin.  Titrate Pitocin to achieve a goal of 200-220 [place] units.  2.  Plan of care has been discussed with the patient at length.    DVT prophylaxis:  Mechanical DVT prophylaxis orders are present.  Will plan pharmacologic thromboprophylaxis after delivery.      CODE STATUS:    Level Of Support Discussed With: Patient  Code Status (Patient has no pulse and is not breathing): CPR (Attempt to Resuscitate)  Medical Interventions (Patient has pulse or is breathing): Full Support    Admission Status:  I believe this patient meets inpatient status.    Roni Sandra MD

## 2024-05-20 NOTE — ANESTHESIA PREPROCEDURE EVALUATION
" Anesthesia Evaluation     Patient summary reviewed and Nursing notes reviewed   no history of anesthetic complications:                Airway   Mallampati: II  Dental      Pulmonary    (+) a smoker Former,  (-) COPD, asthma  Cardiovascular   Exercise tolerance: good (4-7 METS)    Rhythm: regular    (+) valvular problems/murmurs murmur, PVD (off blood thinners)  (-) past MI, angina, cardiac stents      Neuro/Psych  (-) seizures, CVA  GI/Hepatic/Renal/Endo    (-) GERD, liver disease, no renal disease    Musculoskeletal     Abdominal    Substance History - negative use     OB/GYN    (+) Pregnant        Other - negative ROS                   Anesthesia Plan    ASA 2     epidural     (Intrauterine pregnancy at 38w2d    I have reviewed the patient's history and chart with the patient, including all pertinent laboratory results and imaging. I have explained the risks of anesthesia labor epidurals including, low blood pressure, PDPHA, bleeding, infection, or possible nerve injury. I discussed expectations for adequate pain control but the inability to eliminate all pain and discomfort. There is a risk of epidural failure and the need for replacement or reliance on alternatives.     VITALS:  /80 (BP Location: Right arm, Patient Position: Sitting)   Pulse 83   Temp 36.9 °C (98.4 °F) (Oral)   Resp 20   Ht 175.3 cm (69.02\")   LMP 08/26/2023 (Exact Date)   SpO2 100%   Breastfeeding Yes   BMI 28.78 kg/m²   )    Anesthetic plan, risks, benefits, and alternatives have been provided, discussed and informed consent has been obtained with: patient.  Pre-procedure education provided    CODE STATUS:    Level Of Support Discussed With: Patient  Code Status (Patient has no pulse and is not breathing): CPR (Attempt to Resuscitate)  Medical Interventions (Patient has pulse or is breathing): Full Support      "

## 2024-05-20 NOTE — ANESTHESIA POSTPROCEDURE EVALUATION
Patient: Osbaldo Mayo    Procedure Summary       Date: 05/20/24 Room / Location:     Anesthesia Start: 0652 Anesthesia Stop: 1248    Procedure: LABOR ANALGESIA Diagnosis:     Scheduled Providers:  Provider: Yris Shell MD    Anesthesia Type: epidural ASA Status: 2            Anesthesia Type: No value filed.    Vitals  Vitals Value Taken Time   /60 05/20/24 1331   Temp 36.7 °C (98.1 °F) 05/20/24 1315   Pulse 67 05/20/24 1331   Resp 18 05/20/24 1315   SpO2 99 % 05/20/24 1320   Vitals shown include unfiled device data.        Post Anesthesia Care and Evaluation    No anesthesia care post op

## 2024-05-20 NOTE — L&D DELIVERY NOTE
[facility]   Vaginal Delivery Note    Patient Name: Osbaldo Mayo  : 1995  MRN: [id number]    Date of Delivery: 2024     Diagnosis     Pre & Post-Delivery:  Intrauterine pregnancy at 38w2d  Labor status: Spontaneous Onset of Labor     Pregnancy             Problem List    Transfer to Postpartum     Review the Delivery Report for details.     Delivery     Delivery: Vaginal, Spontaneous     YOB: 2026    Time of Birth:  Gestational Age 12:48 PM   38w2d     Anesthesia: Epidural     Delivering clinician: Roni Sandra    Forceps?   No   Vacuum? No    Shoulder dystocia present: No        Delivery narrative:  I came to the room when the cervix was completely dilated completely effaced and +2 station. Under continuous external fetal heart rate monitoring the patient was encouraged to push. She pushed for about 3 minutes. Fetal heart tones were reassuring while pushing. With good maternal effort, she delivered  viable male infant. Head presented in OA presentation and restituted to LOT. The right anterior fetal shoulder was easily delivered with a gentle floorward motion, followed by the posterior shoulder with gentle upward motion, followed by the remainder of the infant. Baby was immediately vigorous. Baby was placed on maternal abdomen and the cord was clamped after routine roughly 60 second delay. Cord blood was collected. Placenta delivered spontaneously intact and 3-vessel cord noted. The vagina, cervix, perineum and rectum were inspected for lacerations, and a second-degree perineal laceration was noted. This was repaired in a typical fashion using 2-0 vicryl rapide. Counts for needles, sponges, laps, and instruments were correct x 2 at the completion. Vaginal sweep and rectal exam perform. No sponges left in vagina. There were no complications. Mother and baby bonding well at the end of the delivery. Dr. Sandra was present and scrubbed for entire delivery.   "      Infant     Findings: male  infant     Infant observations: Weight: Pending at the time of completion of the note  Length:   in  Observations/Comments:  Olivier in LR4      Apgars: 9  @ 1 minute /    9  @ 5 minutes         Placenta & Cord         Placenta delivered  Spontaneous  at   5/20/2024 12:58 PM     Cord: 3 vessels  present.   Nuchal Cord?  no   Cord blood obtained: Yes    Cord gases obtained:  No    Cord gas results: Venous:  No results found for: \"PHCVEN\", \"BECVEN\"    Arterial:  No results found for: \"PHCART\", \"BECART\"     Repair     Episiotomy: None     No    Lacerations: Yes  Laceration Information  Laceration Repaired?   Perineal: 2nd  Yes    Periurethral:       Labial:       Sulcus:       Vaginal:       Cervical:         Suture used for repair: 2-0 Vicryl Rapide     Estimated Blood Loss:       Quantitative Blood Loss: Quantitative Blood Loss (mL): 165 mL (05/20/24 1306)        Complications     none    Disposition     Mother to Mother Baby/Postpartum  in stable condition currently.  Baby to remains with mom  in stable condition currently.    Roni Sandra MD  05/20/24  13:28 EDT        "

## 2024-05-20 NOTE — PLAN OF CARE
Problem: Adult Inpatient Plan of Care  Goal: Plan of Care Review  Flowsheets (Taken 5/20/2024 1422)  Progress: improving  Plan of Care Reviewed With:   patient   family  Outcome Evaluation: pt admitted for labor this am, epdiural palced, AROM per MD, augmentation with pitocin, pt progressed to 10cm, viable mlae born     Problem: Adult Inpatient Plan of Care  Goal: Patient-Specific Goal (Individualized)  Flowsheets (Taken 5/20/2024 1422)  Patient-Specific Goals (Include Timeframe): pain well controlled, successful breastfeeding  Anxieties, Fears or Concerns: nervous about cramping pain after delivery   Goal Outcome Evaluation:

## 2024-05-20 NOTE — ANESTHESIA PROCEDURE NOTES
Labor Epidural      Patient reassessed immediately prior to procedure    Patient location during procedure: OB  Start Time: 5/20/2024 7:01 AM  Stop Time: 5/20/2024 7:20 AM  Performed By  Anesthesiologist: Yris Shell MD  Preanesthetic Checklist  Completed: patient identified, IV checked, site marked, risks and benefits discussed, surgical consent, monitors and equipment checked, pre-op evaluation and timeout performed  Prep:  Pt Position:sitting  Sterile Tech:cap, gloves, mask and sterile barrier  Prep:chlorhexidine gluconate and isopropyl alcohol  Monitoring:blood pressure monitoring and continuous pulse oximetry  Epidural Block Procedure:  Approach:midline  Guidance:landmark technique and palpation technique  Location:L4-L5  Needle Type:Tuohy  Needle Gauge:17 G  Loss of Resistance Medium: saline  Loss of Resistance: 5cm  Cath Depth at skin:10 cm  Paresthesia: left and transient  Aspiration:negative  Test Dose:negative  Number of Attempts: 1  Post Assessment:  Dressing:occlusive dressing applied and secured with tape  Pt Tolerance:patient tolerated the procedure well with no apparent complications  Complications:no

## 2024-05-21 LAB
BASOPHILS # BLD AUTO: 0.01 10*3/MM3 (ref 0–0.2)
BASOPHILS NFR BLD AUTO: 0.2 % (ref 0–1.5)
DEPRECATED RDW RBC AUTO: 42.8 FL (ref 37–54)
EOSINOPHIL # BLD AUTO: 0.05 10*3/MM3 (ref 0–0.4)
EOSINOPHIL NFR BLD AUTO: 0.8 % (ref 0.3–6.2)
ERYTHROCYTE [DISTWIDTH] IN BLOOD BY AUTOMATED COUNT: 15.9 % (ref 12.3–15.4)
FERRITIN SERPL-MCNC: 20.2 NG/ML (ref 13–150)
HCT VFR BLD AUTO: 26.6 % (ref 34–46.6)
HGB BLD-MCNC: 8.1 G/DL (ref 12–15.9)
IMM GRANULOCYTES # BLD AUTO: 0.08 10*3/MM3 (ref 0–0.05)
IMM GRANULOCYTES NFR BLD AUTO: 1.3 % (ref 0–0.5)
IRON 24H UR-MRATE: 110 MCG/DL (ref 37–145)
IRON SATN MFR SERPL: 23 % (ref 20–50)
LYMPHOCYTES # BLD AUTO: 1.07 10*3/MM3 (ref 0.7–3.1)
LYMPHOCYTES NFR BLD AUTO: 18 % (ref 19.6–45.3)
MCH RBC QN AUTO: 22.8 PG (ref 26.6–33)
MCHC RBC AUTO-ENTMCNC: 30.5 G/DL (ref 31.5–35.7)
MCV RBC AUTO: 74.9 FL (ref 79–97)
MONOCYTES # BLD AUTO: 0.57 10*3/MM3 (ref 0.1–0.9)
MONOCYTES NFR BLD AUTO: 9.6 % (ref 5–12)
NEUTROPHILS NFR BLD AUTO: 4.16 10*3/MM3 (ref 1.7–7)
NEUTROPHILS NFR BLD AUTO: 70.1 % (ref 42.7–76)
PLATELET # BLD AUTO: 192 10*3/MM3 (ref 140–450)
PMV BLD AUTO: 9.9 FL (ref 6–12)
RBC # BLD AUTO: 3.55 10*6/MM3 (ref 3.77–5.28)
TIBC SERPL-MCNC: 469 MCG/DL (ref 298–536)
TRANSFERRIN SERPL-MCNC: 315 MG/DL (ref 200–360)
WBC NRBC COR # BLD AUTO: 5.94 10*3/MM3 (ref 3.4–10.8)

## 2024-05-21 PROCEDURE — 82728 ASSAY OF FERRITIN: CPT | Performed by: STUDENT IN AN ORGANIZED HEALTH CARE EDUCATION/TRAINING PROGRAM

## 2024-05-21 PROCEDURE — 0503F POSTPARTUM CARE VISIT: CPT | Performed by: STUDENT IN AN ORGANIZED HEALTH CARE EDUCATION/TRAINING PROGRAM

## 2024-05-21 PROCEDURE — 25810000003 SODIUM CHLORIDE 0.9 % SOLUTION 250 ML FLEX CONT: Performed by: STUDENT IN AN ORGANIZED HEALTH CARE EDUCATION/TRAINING PROGRAM

## 2024-05-21 PROCEDURE — 84466 ASSAY OF TRANSFERRIN: CPT | Performed by: STUDENT IN AN ORGANIZED HEALTH CARE EDUCATION/TRAINING PROGRAM

## 2024-05-21 PROCEDURE — 83540 ASSAY OF IRON: CPT | Performed by: STUDENT IN AN ORGANIZED HEALTH CARE EDUCATION/TRAINING PROGRAM

## 2024-05-21 PROCEDURE — 63710000001 DIPHENHYDRAMINE PER 50 MG: Performed by: STUDENT IN AN ORGANIZED HEALTH CARE EDUCATION/TRAINING PROGRAM

## 2024-05-21 PROCEDURE — 85025 COMPLETE CBC W/AUTO DIFF WBC: CPT | Performed by: OBSTETRICS & GYNECOLOGY

## 2024-05-21 PROCEDURE — 25010000002 IRON SUCROSE PER 1 MG: Performed by: STUDENT IN AN ORGANIZED HEALTH CARE EDUCATION/TRAINING PROGRAM

## 2024-05-21 RX ORDER — DIPHENHYDRAMINE HCL 25 MG
25 CAPSULE ORAL ONCE
Qty: 1 CAPSULE | Refills: 0 | Status: COMPLETED | OUTPATIENT
Start: 2024-05-21 | End: 2024-05-21

## 2024-05-21 RX ORDER — ACETAMINOPHEN 325 MG/1
650 TABLET ORAL ONCE
Qty: 2 TABLET | Refills: 0 | Status: DISCONTINUED | OUTPATIENT
Start: 2024-05-21 | End: 2024-05-22 | Stop reason: HOSPADM

## 2024-05-21 RX ADMIN — IBUPROFEN 600 MG: 600 TABLET, FILM COATED ORAL at 17:21

## 2024-05-21 RX ADMIN — IBUPROFEN 600 MG: 600 TABLET, FILM COATED ORAL at 23:35

## 2024-05-21 RX ADMIN — IBUPROFEN 600 MG: 600 TABLET, FILM COATED ORAL at 11:03

## 2024-05-21 RX ADMIN — IRON SUCROSE 300 MG: 20 INJECTION, SOLUTION INTRAVENOUS at 18:55

## 2024-05-21 RX ADMIN — DIPHENHYDRAMINE HYDROCHLORIDE 25 MG: 25 CAPSULE ORAL at 18:20

## 2024-05-21 RX ADMIN — DOCUSATE SODIUM 100 MG: 100 CAPSULE, LIQUID FILLED ORAL at 20:16

## 2024-05-21 RX ADMIN — ACETAMINOPHEN 650 MG: 325 TABLET, FILM COATED ORAL at 18:20

## 2024-05-21 RX ADMIN — ACETAMINOPHEN 650 MG: 325 TABLET, FILM COATED ORAL at 12:44

## 2024-05-21 RX ADMIN — DOCUSATE SODIUM 100 MG: 100 CAPSULE, LIQUID FILLED ORAL at 08:40

## 2024-05-21 RX ADMIN — TRAMADOL HYDROCHLORIDE 50 MG: 50 TABLET ORAL at 08:40

## 2024-05-21 RX ADMIN — IBUPROFEN 600 MG: 600 TABLET, FILM COATED ORAL at 04:24

## 2024-05-21 RX ADMIN — Medication 1 TABLET: at 08:40

## 2024-05-21 RX ADMIN — ACETAMINOPHEN 650 MG: 325 TABLET, FILM COATED ORAL at 05:43

## 2024-05-21 NOTE — LACTATION NOTE
This note was copied from a baby's chart.  Reports baby is BF well.  Denies any nipple discomfort with latch. Encouraged to call LC for latch assessment with next feeding.   Several  wet and stools recorded on feeding log.  Educated on frequency of feedings, expected output, and when to expect milk supply.  Plans possible d/c today.  Information given for [facility] and to call for any questions or concerns.  Has ordered a Spectra pump through [employer].  LC number on whiteboard.

## 2024-05-21 NOTE — PROGRESS NOTES
Postpartum Progress Note      Status post Vaginal Delivery: Doing well postoperatively.     1) postpartum care immediately following delivery :   - Continue routine postpartum.  - anticipate discharge home tomorrow on PPD#2.     2) History of DVT of left upper extremity   - Diagnosed with DVT left upper extremity on 11/13/23.   - She has been non-compliant with anticoagulation with Lovenox and stopped it about 3 months ago despite recommendation to continue through 6 weeks postpartum.     3) Acute on chronic anemia  -  cc  - Hgb 9.0 on admission and decreased to Hgb 8.0 today on PPD#1.  - She remains asymptomatic   - Discussed option of PO iron supplementation versus IV Venofer and the patient desires IV venofer infusion. Orders placed for IV Venofer 300 mg with premeds.     Rh status: A negative - infant A negative, Rhogam not indicated   Rubella: Immune  Gender: Male infant- Desires elective male circumcision. Reviewed procedure of circumcision and discussed risks including but not limited to bleeding, infection, damage to surrounding structures requiring possible revision, cosmesis. Consents signed. Will proceed with circumcision of infant today.   T. Pallidum Antibody: NR     Subjective     Postpartum Day 1: Vaginal delivery    The patient feels well. The patient denies emotional concerns. Pain is well controlled with current medications. The baby is well. The patient is ambulating well. The patient is tolerating a normal diet. She reports vaginal bleeding is lessening. She reports voiding without difficulty. She is passing flatus.     Objective     Vital signs in last 24 hours:  Temp:  [97.3 °F (36.3 °C)-98.7 °F (37.1 °C)] 98.1 °F (36.7 °C)  Heart Rate:  [60-94] 75  Resp:  [16-18] 18  BP: ()/(47-92) 111/66      General:    alert, appears stated age, and cooperative   Lungs:  No increased work of breathing, regular respirations    Abdomen:  Soft, Non-tender    Lochia:  appropriate   Uterine Fundus:    firm   Ext    No lower extremity edema    DVT Evaluation:  No evidence of DVT seen on physical exam.     Lab Results   Component Value Date    WBC 5.94 05/21/2024    HGB 8.1 (L) 05/21/2024    HCT 26.6 (L) 05/21/2024    MCV 74.9 (L) 05/21/2024     05/21/2024       Felisha Tovar MD  5/21/2024  10:38 EDT

## 2024-05-21 NOTE — PLAN OF CARE
Problem: Adult Inpatient Plan of Care  Goal: Plan of Care Review  Outcome: Ongoing, Progressing  Flowsheets  Taken 5/21/2024 0445 by Niharika Valdivia RN  Plan of Care Reviewed With: patient  Outcome Evaluation: VSS, assessment WNL, lochia and fundus WNL, up ad halle, voiding spontaneously without difficulty, breastfeeding well, pain controlled with PRN tylenol and motrin, wanting to be d/c today.  Taken 5/20/2024 1714 by Earline Grey RN  Progress: improving  Goal: Patient-Specific Goal (Individualized)  Outcome: Ongoing, Progressing  Goal: Absence of Hospital-Acquired Illness or Injury  Outcome: Ongoing, Progressing  Intervention: Identify and Manage Fall Risk  Recent Flowsheet Documentation  Taken 5/21/2024 0424 by Niharika Valdivia RN  Safety Promotion/Fall Prevention: safety round/check completed  Taken 5/21/2024 0250 by Niharika Valdivia RN  Safety Promotion/Fall Prevention: safety round/check completed  Taken 5/21/2024 0050 by Niharika Valdivia RN  Safety Promotion/Fall Prevention: safety round/check completed  Taken 5/20/2024 2316 by Niharika Valdivia RN  Safety Promotion/Fall Prevention: safety round/check completed  Taken 5/20/2024 2216 by Niharika Valdivia RN  Safety Promotion/Fall Prevention: safety round/check completed  Taken 5/20/2024 2032 by Niharika Valdviia RN  Safety Promotion/Fall Prevention: safety round/check completed  Taken 5/20/2024 2000 by Niharika Valdivia RN  Safety Promotion/Fall Prevention: safety round/check completed  Taken 5/20/2024 1932 by Niharika Valdivia RN  Safety Promotion/Fall Prevention: safety round/check completed  Intervention: Prevent Skin Injury  Recent Flowsheet Documentation  Taken 5/20/2024 1932 by Niharika Valdivia RN  Body Position: position changed independently  Intervention: Prevent and Manage VTE (Venous Thromboembolism) Risk  Recent Flowsheet Documentation  Taken 5/20/2024 1932 by Niharika Valdivia RN  Activity Management:   up ad halle   ambulated to  bathroom  Goal: Optimal Comfort and Wellbeing  Outcome: Ongoing, Progressing  Intervention: Monitor Pain and Promote Comfort  Recent Flowsheet Documentation  Taken 5/21/2024 0424 by Niharika Valdivia RN  Pain Management Interventions: see MAR  Taken 5/20/2024 2216 by Niharika Valdivia RN  Pain Management Interventions: see MAR  Taken 5/20/2024 1932 by Niharika Valdivia RN  Pain Management Interventions: see MAR  Intervention: Provide Person-Centered Care  Recent Flowsheet Documentation  Taken 5/20/2024 1932 by Niharika Valdivia RN  Trust Relationship/Rapport:   care explained   choices provided   thoughts/feelings acknowledged   reassurance provided   questions encouraged   questions answered  Goal: Readiness for Transition of Care  Outcome: Ongoing, Progressing     Problem: Skin Injury Risk Increased  Goal: Skin Health and Integrity  Outcome: Ongoing, Progressing  Intervention: Optimize Skin Protection  Recent Flowsheet Documentation  Taken 5/20/2024 1932 by Niharika Valdivia RN  Head of Bed (HOB) Positioning: HOB elevated     Problem: Device-Related Complication Risk (Anesthesia/Analgesia, Neuraxial)  Goal: Safe Infusion Delivery Completion  Outcome: Ongoing, Progressing     Problem: Infection (Anesthesia/Analgesia, Neuraxial)  Goal: Absence of Infection Signs and Symptoms  Outcome: Ongoing, Progressing     Problem: Nausea and Vomiting (Anesthesia/Analgesia, Neuraxial)  Goal: Nausea and Vomiting Relief  Outcome: Ongoing, Progressing     Problem: Pain (Anesthesia/Analgesia, Neuraxial)  Goal: Effective Pain Control  Outcome: Ongoing, Progressing  Intervention: Prevent or Manage Pain  Recent Flowsheet Documentation  Taken 5/21/2024 0424 by Niharika Valdivia RN  Pain Management Interventions: see MAR  Taken 5/20/2024 2216 by Niharika Valdivia RN  Pain Management Interventions: see MAR  Taken 5/20/2024 1932 by Niharika Valdivia RN  Pain Management Interventions: see MAR  Diversional Activities: smartphone     Problem:  Respiratory Compromise (Anesthesia/Analgesia, Neuraxial)  Goal: Effective Oxygenation and Ventilation  Outcome: Ongoing, Progressing  Intervention: Optimize Oxygenation and Ventilation  Recent Flowsheet Documentation  Taken 2024 by Niharika Valdivia RN  Head of Bed (HOB) Positioning: HOB elevated     Problem: Sensorimotor Impairment (Anesthesia/Analgesia, Neuraxial)  Goal: Baseline Motor Function  Outcome: Ongoing, Progressing  Intervention: Optimize Sensorimotor Function  Recent Flowsheet Documentation  Taken 2024 0424 by Niharika Valdivia RN  Safety Promotion/Fall Prevention: safety round/check completed  Taken 2024 0250 by Niharika Valdivia RN  Safety Promotion/Fall Prevention: safety round/check completed  Taken 2024 0050 by Niharika Valdivia RN  Safety Promotion/Fall Prevention: safety round/check completed  Taken 2024 2316 by Niharika Valdivia RN  Safety Promotion/Fall Prevention: safety round/check completed  Taken 2024 2216 by Niharika Valdivia RN  Safety Promotion/Fall Prevention: safety round/check completed  Taken 2024 by Niharika Valdivia RN  Safety Promotion/Fall Prevention: safety round/check completed  Taken 2024 by Niharika Valdivia RN  Safety Promotion/Fall Prevention: safety round/check completed  Taken 2024 by Niharika Valdivia RN  Safety Promotion/Fall Prevention: safety round/check completed     Problem: Urinary Retention (Anesthesia/Analgesia, Neuraxial)  Goal: Effective Urinary Elimination  Outcome: Ongoing, Progressing     Problem:  Fall Injury Risk  Goal: Absence of Fall, Infant Drop and Related Injury  Outcome: Ongoing, Progressing  Intervention: Identify and Manage Contributors  Recent Flowsheet Documentation  Taken 2024 by Niharika Valdivia RN  Medication Review/Management: medications reviewed  Self-Care Promotion: independence encouraged  Intervention: Promote Injury-Free Environment  Recent Flowsheet  Documentation  Taken 5/21/2024 0424 by Niharika Valdivia RN  Safety Promotion/Fall Prevention: safety round/check completed  Taken 5/21/2024 0250 by Niharika Valdivia RN  Safety Promotion/Fall Prevention: safety round/check completed  Taken 5/21/2024 0050 by Niharika Valdivia RN  Safety Promotion/Fall Prevention: safety round/check completed  Taken 5/20/2024 2316 by Niharika Valdivia RN  Safety Promotion/Fall Prevention: safety round/check completed  Taken 5/20/2024 2216 by Niharika Valdivia RN  Safety Promotion/Fall Prevention: safety round/check completed  Taken 5/20/2024 2032 by Niharika Valdivia RN  Safety Promotion/Fall Prevention: safety round/check completed  Taken 5/20/2024 2000 by Niharika Valdivia RN  Safety Promotion/Fall Prevention: safety round/check completed  Taken 5/20/2024 1932 by Niharika Valdivia RN  Safety Promotion/Fall Prevention: safety round/check completed     Problem: Adjustment to Role Transition (Postpartum Vaginal Delivery)  Goal: Successful Maternal Role Transition  Outcome: Ongoing, Progressing     Problem: Bleeding (Postpartum Vaginal Delivery)  Goal: Hemostasis  Outcome: Ongoing, Progressing     Problem: Infection (Postpartum Vaginal Delivery)  Goal: Absence of Infection Signs/Symptoms  Outcome: Ongoing, Progressing  Intervention: Prevent or Manage Infection  Recent Flowsheet Documentation  Taken 5/20/2024 1932 by Niharika Valdivia RN  Perineal Care:   absorbent brief/pad changed   perineum cleansed   perineal spray bottle/warm water use encouraged   perineal hygiene encouraged   medicated pads applied   cold pack/ice pack removed   cold pack/ice pack applied     Problem: Pain (Postpartum Vaginal Delivery)  Goal: Acceptable Pain Control  Outcome: Ongoing, Progressing  Intervention: Prevent or Manage Pain  Recent Flowsheet Documentation  Taken 5/21/2024 0424 by Niharika Valdivia RN  Pain Management Interventions: see MAR  Taken 5/20/2024 2216 by Niharika Valdivia RN  Pain Management  Interventions: see MAR  Taken 5/20/2024 1932 by Niharika Valdivia, RN  Pain Management Interventions: see MAR     Problem: Urinary Retention (Postpartum Vaginal Delivery)  Goal: Effective Urinary Elimination  Outcome: Ongoing, Progressing   Goal Outcome Evaluation:  Plan of Care Reviewed With: patient           Outcome Evaluation: VSS, assessment WNL, lochia and fundus WNL, up ad halle, voiding spontaneously without difficulty, breastfeeding well, pain controlled with PRN tylenol and motrin, wanting to be d/c today.

## 2024-05-21 NOTE — LACTATION NOTE
This note was copied from a baby's chart.  P4, T bf all children up to 6 months. Feeding regularly per chart. Family sleeping when rounding.

## 2024-05-22 VITALS
OXYGEN SATURATION: 99 % | HEART RATE: 55 BPM | RESPIRATION RATE: 16 BRPM | SYSTOLIC BLOOD PRESSURE: 115 MMHG | BODY MASS INDEX: 28.78 KG/M2 | HEIGHT: 69 IN | DIASTOLIC BLOOD PRESSURE: 62 MMHG | TEMPERATURE: 98.5 F

## 2024-05-22 PROCEDURE — 0503F POSTPARTUM CARE VISIT: CPT

## 2024-05-22 RX ORDER — FERROUS SULFATE 325(65) MG
325 TABLET ORAL
Qty: 30 TABLET | Refills: 2 | Status: SHIPPED | OUTPATIENT
Start: 2024-05-22

## 2024-05-22 RX ORDER — IBUPROFEN 600 MG/1
600 TABLET ORAL EVERY 6 HOURS PRN
Qty: 60 TABLET | Refills: 0 | Status: SHIPPED | OUTPATIENT
Start: 2024-05-22

## 2024-05-22 RX ORDER — DOCUSATE SODIUM 100 MG/1
100 CAPSULE, LIQUID FILLED ORAL 2 TIMES DAILY
Qty: 60 CAPSULE | Refills: 1 | Status: SHIPPED | OUTPATIENT
Start: 2024-05-22

## 2024-05-22 RX ADMIN — TRAMADOL HYDROCHLORIDE 50 MG: 50 TABLET ORAL at 11:23

## 2024-05-22 RX ADMIN — Medication 1 TABLET: at 08:01

## 2024-05-22 RX ADMIN — ACETAMINOPHEN 650 MG: 325 TABLET, FILM COATED ORAL at 00:41

## 2024-05-22 RX ADMIN — ACETAMINOPHEN 650 MG: 325 TABLET, FILM COATED ORAL at 07:56

## 2024-05-22 RX ADMIN — Medication: at 08:09

## 2024-05-22 RX ADMIN — IBUPROFEN 600 MG: 600 TABLET, FILM COATED ORAL at 05:32

## 2024-05-22 RX ADMIN — DOCUSATE SODIUM 100 MG: 100 CAPSULE, LIQUID FILLED ORAL at 08:01

## 2024-05-22 NOTE — PROGRESS NOTES
VAGINAL DELIVERY POSTPARTUM DAY 2    5/22/2024  PATIENT: Osbaldo Mayo        MR#:[id number]  LOCATION: [facility]  DATE OF ADMISSION: 5/20/2024  ADMISSION  DIAGNOSIS:   Normal labor and delivery     CURRENT DIAGNOSIS: No notes have been filed under this hospital service.  Service: Hospitalist      SUBJECTIVE     Osbaldo feels well.  Patient describes her lochia as less than menses.  Pain is well controlled.       OBJECTIVE   Temp: Temp:  [97.8 °F (36.6 °C)-99.4 °F (37.4 °C)] 98.5 °F (36.9 °C) Temp src: Oral   BP: BP: (100-123)/(62-82) 115/62        Pulse: Heart Rate:  [55-72] 55  RR: Resp:  [16-18] 16    General:  Awake, alert, no acute distress   Cardiac: Regular rate and rhythm    Respiratory: Lungs clear bilaterally, normal respiratory effort    Abdomen: Soft, non-distended, fundus firm, below umbilicus, appropriately tender   Pelvis: deferred   Extremities: Calves NT bilaterally, DTR 2+, no clonus noted, trace edema     Lab Results   Component Value Date    WBC 5.94 05/21/2024    HGB 8.1 (L) 05/21/2024    HCT 26.6 (L) 05/21/2024     05/21/2024    ABORH A Negative 03/08/2023    AST 12 05/20/2024    ALT 6 05/20/2024    CREATININE 0.46 (L) 05/20/2024       ASSESSMENT: Postpartum day 2 after vaginal delivery. Hgb: 8.1. She did receive IV venofer.     PLAN: Doing well. Discharge to home. Discharge instructions given, precautions reviewed. Follow up with [facility]  in 4 to 6 weeks for routine postpartum visit. Prescription for ibuprofen 600mg PO every 6 hours PRN for pain, docusate 100mg PO BID, and ferrous sulfate 325mg daily. Advised no tampons, menstrual cups, intercourse, or tub baths for 6 weeks.     Merced Pollack CNM  10:44 EDT  May 22, 2024

## 2024-05-22 NOTE — PLAN OF CARE
Problem: Adult Inpatient Plan of Care  Goal: Plan of Care Review  Outcome: Ongoing, Progressing  Flowsheets (Taken 5/22/2024 0631)  Progress: improving  Plan of Care Reviewed With: patient  Outcome Evaluation: VSS, assessment WNL, pain well controlled with PO tylenol and motrin, voiding spontaneously without difficulty, d/c today.  Goal: Patient-Specific Goal (Individualized)  Outcome: Ongoing, Progressing  Goal: Absence of Hospital-Acquired Illness or Injury  Outcome: Ongoing, Progressing  Intervention: Identify and Manage Fall Risk  Recent Flowsheet Documentation  Taken 5/22/2024 0600 by Niharika Valdivia RN  Safety Promotion/Fall Prevention: safety round/check completed  Taken 5/22/2024 0532 by Niharika Valdivia RN  Safety Promotion/Fall Prevention: safety round/check completed  Taken 5/22/2024 0430 by Niharika Valdivia RN  Safety Promotion/Fall Prevention: safety round/check completed  Taken 5/22/2024 0250 by Niharika Valdivia RN  Safety Promotion/Fall Prevention: safety round/check completed  Taken 5/22/2024 0141 by Niharika Valdivia RN  Safety Promotion/Fall Prevention: safety round/check completed  Taken 5/22/2024 0041 by Niharika Valdivia RN  Safety Promotion/Fall Prevention: safety round/check completed  Taken 5/22/2024 0035 by Niharika Valdivia RN  Safety Promotion/Fall Prevention: safety round/check completed  Taken 5/21/2024 2335 by Niharika Valdivia RN  Safety Promotion/Fall Prevention: safety round/check completed  Taken 5/21/2024 2200 by Niharika Valdivia RN  Safety Promotion/Fall Prevention: safety round/check completed  Taken 5/21/2024 2100 by Niharika Valdivia RN  Safety Promotion/Fall Prevention: safety round/check completed  Taken 5/21/2024 2015 by Niharika Valdivia RN  Safety Promotion/Fall Prevention: safety round/check completed  Intervention: Prevent Skin Injury  Recent Flowsheet Documentation  Taken 5/21/2024 2015 by Niharika Valdivia RN  Body Position: position changed  independently  Intervention: Prevent and Manage VTE (Venous Thromboembolism) Risk  Recent Flowsheet Documentation  Taken 5/21/2024 2015 by Niharika Valdivia RN  Activity Management: up ad halle  Goal: Optimal Comfort and Wellbeing  Outcome: Ongoing, Progressing  Intervention: Monitor Pain and Promote Comfort  Recent Flowsheet Documentation  Taken 5/22/2024 0532 by Niharika Valdivia RN  Pain Management Interventions: see MAR  Taken 5/22/2024 0041 by Niharika Valdivia RN  Pain Management Interventions: see MAR  Taken 5/21/2024 2335 by Niharika Valdivia RN  Pain Management Interventions: see MAR  Intervention: Provide Person-Centered Care  Recent Flowsheet Documentation  Taken 5/21/2024 2015 by Niharika Valdivia RN  Trust Relationship/Rapport:   care explained   choices provided   thoughts/feelings acknowledged   reassurance provided   questions encouraged   questions answered  Goal: Readiness for Transition of Care  Outcome: Ongoing, Progressing     Problem: Skin Injury Risk Increased  Goal: Skin Health and Integrity  Outcome: Ongoing, Progressing  Intervention: Optimize Skin Protection  Recent Flowsheet Documentation  Taken 5/21/2024 2015 by Niharika Valdivia RN  Head of Bed (HOB) Positioning: HOB elevated     Problem: Device-Related Complication Risk (Anesthesia/Analgesia, Neuraxial)  Goal: Safe Infusion Delivery Completion  Outcome: Ongoing, Progressing     Problem: Infection (Anesthesia/Analgesia, Neuraxial)  Goal: Absence of Infection Signs and Symptoms  Outcome: Ongoing, Progressing     Problem: Nausea and Vomiting (Anesthesia/Analgesia, Neuraxial)  Goal: Nausea and Vomiting Relief  Outcome: Ongoing, Progressing     Problem: Pain (Anesthesia/Analgesia, Neuraxial)  Goal: Effective Pain Control  Outcome: Ongoing, Progressing  Intervention: Prevent or Manage Pain  Recent Flowsheet Documentation  Taken 5/22/2024 0532 by Niharika Valdivia RN  Pain Management Interventions: see MAR  Taken 5/22/2024 0041 by Shea  CHECO Bundy  Pain Management Interventions: see MAR  Taken 5/21/2024 2335 by Niharika Valdivia RN  Pain Management Interventions: see MAR  Taken 5/21/2024 2015 by Niharika Valdivia RN  Diversional Activities:   smartphone   television     Problem: Respiratory Compromise (Anesthesia/Analgesia, Neuraxial)  Goal: Effective Oxygenation and Ventilation  Outcome: Ongoing, Progressing  Intervention: Optimize Oxygenation and Ventilation  Recent Flowsheet Documentation  Taken 5/21/2024 2015 by Niharika Valdivia RN  Head of Bed (HOB) Positioning: HOB elevated     Problem: Sensorimotor Impairment (Anesthesia/Analgesia, Neuraxial)  Goal: Baseline Motor Function  Outcome: Ongoing, Progressing  Intervention: Optimize Sensorimotor Function  Recent Flowsheet Documentation  Taken 5/22/2024 0600 by Niharika Valdivia RN  Safety Promotion/Fall Prevention: safety round/check completed  Taken 5/22/2024 0532 by Niharika Valdivia RN  Safety Promotion/Fall Prevention: safety round/check completed  Taken 5/22/2024 0430 by Niharika Valdivia RN  Safety Promotion/Fall Prevention: safety round/check completed  Taken 5/22/2024 0250 by Niharika Valdivia RN  Safety Promotion/Fall Prevention: safety round/check completed  Taken 5/22/2024 0141 by Niharika Valdivia RN  Safety Promotion/Fall Prevention: safety round/check completed  Taken 5/22/2024 0041 by Niharika Valdivia RN  Safety Promotion/Fall Prevention: safety round/check completed  Taken 5/22/2024 0035 by Niharika Valdivia RN  Safety Promotion/Fall Prevention: safety round/check completed  Taken 5/21/2024 2335 by Niharika Valdivia RN  Safety Promotion/Fall Prevention: safety round/check completed  Taken 5/21/2024 2200 by Niharika Valdivia RN  Safety Promotion/Fall Prevention: safety round/check completed  Taken 5/21/2024 2100 by Niharika Valdivia RN  Safety Promotion/Fall Prevention: safety round/check completed  Taken 5/21/2024 2015 by Niharika Valdivia RN  Safety Promotion/Fall Prevention:  safety round/check completed     Problem: Urinary Retention (Anesthesia/Analgesia, Neuraxial)  Goal: Effective Urinary Elimination  Outcome: Ongoing, Progressing     Problem:  Fall Injury Risk  Goal: Absence of Fall, Infant Drop and Related Injury  Outcome: Ongoing, Progressing  Intervention: Identify and Manage Contributors  Recent Flowsheet Documentation  Taken 2024 by Niharika Valdivia RN  Self-Care Promotion: independence encouraged  Intervention: Promote Injury-Free Environment  Recent Flowsheet Documentation  Taken 2024 0600 by Niharika Valdivia RN  Safety Promotion/Fall Prevention: safety round/check completed  Taken 2024 0532 by Niharika Valdivia RN  Safety Promotion/Fall Prevention: safety round/check completed  Taken 2024 0430 by Niharika Valdivia RN  Safety Promotion/Fall Prevention: safety round/check completed  Taken 2024 0250 by Niharika Valdivia RN  Safety Promotion/Fall Prevention: safety round/check completed  Taken 2024 0141 by Niharika Valdivia RN  Safety Promotion/Fall Prevention: safety round/check completed  Taken 2024 0041 by Niharika Valdivia RN  Safety Promotion/Fall Prevention: safety round/check completed  Taken 2024 0035 by Niharika Valdivia RN  Safety Promotion/Fall Prevention: safety round/check completed  Taken 2024 2335 by Niharika Valdivia RN  Safety Promotion/Fall Prevention: safety round/check completed  Taken 2024 2200 by Niharika Valdivia RN  Safety Promotion/Fall Prevention: safety round/check completed  Taken 2024 by Niharika Valdivia RN  Safety Promotion/Fall Prevention: safety round/check completed  Taken 2024 by Niharika Valdivia RN  Safety Promotion/Fall Prevention: safety round/check completed     Problem: Adjustment to Role Transition (Postpartum Vaginal Delivery)  Goal: Successful Maternal Role Transition  Outcome: Ongoing, Progressing     Problem: Bleeding (Postpartum Vaginal  Delivery)  Goal: Hemostasis  Outcome: Ongoing, Progressing     Problem: Infection (Postpartum Vaginal Delivery)  Goal: Absence of Infection Signs/Symptoms  Outcome: Ongoing, Progressing  Intervention: Prevent or Manage Infection  Recent Flowsheet Documentation  Taken 5/21/2024 2015 by Niharika Valdivia RN  Perineal Care:   perineum cleansed   perineal spray bottle/warm water use encouraged   perineal hygiene encouraged   absorbent brief/pad changed     Problem: Pain (Postpartum Vaginal Delivery)  Goal: Acceptable Pain Control  Outcome: Ongoing, Progressing  Intervention: Prevent or Manage Pain  Recent Flowsheet Documentation  Taken 5/22/2024 0532 by Niharika Valdivia RN  Pain Management Interventions: see MAR  Taken 5/22/2024 0041 by Niharika Valdivia RN  Pain Management Interventions: see MAR  Taken 5/21/2024 2335 by Niharika Valdivia RN  Pain Management Interventions: see MAR     Problem: Urinary Retention (Postpartum Vaginal Delivery)  Goal: Effective Urinary Elimination  Outcome: Ongoing, Progressing   Goal Outcome Evaluation:  Plan of Care Reviewed With: patient        Progress: improving  Outcome Evaluation: VSS, assessment WNL, pain well controlled with PO tylenol and motrin, voiding spontaneously without difficulty, d/c today.

## 2024-05-22 NOTE — DISCHARGE SUMMARY
VAGINAL DELIVERY DISCHARGE SUMMARY      PATIENT: Osbaldo Mayo        MR#:[id number]  LOCATION: [facility]  ADMISSION  DIAGNOSIS:   Normal labor and delivery     DISCHARGE DIAGNOSIS:   1. Other constipation          DATE OF ADMISSION: 5/20/2024  DATE OF DISCHARGE: 05/22/24     PROCEDURES:  Vaginal, Spontaneous     5/20/2024    12:48 PM      SERVICE: Obstetrics    HOSPITAL COURSE: Osbaldo underwent vaginal delivery of a male infant and remained in the hospital for 2 days. During that time, Osbaldo remained afebrile and hemodynamically stable. On the day of discharge, Osbaldo was eating, ambulating and voiding without difficulty.      VARICELLA: unknown immunity - varicella immunization ordered to be given on postpartum prior to discharge.  RUBELLA: immune.    LABS:   Lab Results   Component Value Date    WBC 5.94 05/21/2024    HGB 8.1 (L) 05/21/2024    HCT 26.6 (L) 05/21/2024    MCV 74.9 (L) 05/21/2024     05/21/2024    GLU 75 02/19/2023    POCGLU 104 11/05/2023    CREATININE 0.46 (L) 05/20/2024    AST 12 05/20/2024    ALT 6 05/20/2024     Results from last 7 days   Lab Units 05/20/24  0556   ABO TYPING  A   RH TYPING  Negative   ANTIBODY SCREEN  Positive       DISCHARGE MEDICATIONS     Discharge Medications        New Medications        Instructions Start Date   ibuprofen 600 MG tablet  Commonly known as: ADVIL,MOTRIN   600 mg, Oral, Every 6 Hours PRN             Continue These Medications        Instructions Start Date   docusate sodium 100 MG capsule  Commonly known as: Colace   100 mg, Oral, 2 Times Daily      ferrous sulfate 325 (65 FE) MG tablet   325 mg, Oral, Daily With Breakfast               DISCHARGE DISPOSITION: Home    DISCHARGE CONDITION: Stable    DISCHARGE DIET: Regular    ACTIVITY AT DISCHARGE: Pelvic rest    INFANT FEEDING PLANS: Breast    EDUCATION: Warning signs and symptoms given, no tub baths, nothing in the vagina for 6 weeks.     FOLLOW-UP APPOINTMENTS: Follow up with [facility]   in 4 to 6 weeks for routine postpartum visit.     Merced Pollack CNM  05/22/24  10:49 EDT

## 2024-05-26 NOTE — CASE MANAGEMENT/SOCIAL WORK
Case Management Discharge Note      Final Note: home         Selected Continued Care - Discharged on 5/22/2024 Admission date: 5/20/2024 - Discharge disposition: Home or Self Care      Destination    No services have been selected for the patient.                Durable Medical Equipment    No services have been selected for the patient.                Dialysis/Infusion    No services have been selected for the patient.                Home Medical Care    No services have been selected for the patient.                Therapy    No services have been selected for the patient.                Community Resources    No services have been selected for the patient.                Community & DME    No services have been selected for the patient.                         Final Discharge Disposition Code: 01 - home or self-care

## 2024-06-03 ENCOUNTER — MATERNAL SCREENING (OUTPATIENT)
Dept: CALL CENTER | Facility: HOSPITAL | Age: 29
End: 2024-06-03
Payer: COMMERCIAL

## 2024-06-03 NOTE — OUTREACH NOTE
Maternal Screening Survey      Flowsheet Row Responses   Facility patient discharged from? [place]   Attempt successful? No   Unsuccessful attempts Attempt 1              Nicky Rodríguez Registered Nurse

## 2024-06-03 NOTE — OUTREACH NOTE
Maternal Screening Survey      Flowsheet Row Responses   [facility]   [facility]? Yes   Call start time 1302   Call end time 1305   EPD Scale: Able to Laugh 0-->as much as she always could   EPD Scale: Looked Forward 0-->as much as she ever did   EPD Scale: Blamed Self 0-->no, never   EPD Scale: Been Anxious 0-->no, not at all   EPD Scale: Felt Panicky 0-->no, not at all   EPD Scale: Things Getting on Top 0-->no, has been coping as well as ever   EPD Scale: Difficulty Sleeping 0-->no, not at all   EPD Scale: Sad or Miserable 0-->no, not at all   EPD Scale: Crying 0-->no, never   EPD Scale: Thought of Harming Self 0-->never   [place]  Depression Score 0   Did any of your parents have problems with alcohol or drug use? No   Do any of your peers have problems with alcohol or drug use? No   Does your partner have problems with alcohol or drug use? No   Before you were pregnant did you have problems with alcohol or drug use? (past) No   In the past month, did you drink beer, wine, liquor or use any other drugs? (pregnancy) No   Maternal Screening call completed Yes   Call end time 1305              Nicky FULLER - Registered Nurse

## 2024-06-05 ENCOUNTER — TELEPHONE (OUTPATIENT)
Dept: OBSTETRICS AND GYNECOLOGY | Facility: CLINIC | Age: 29
End: 2024-06-05
Payer: COMMERCIAL

## 2024-06-05 NOTE — TELEPHONE ENCOUNTER
Pt is requesting a work note stating she may return to work with no restriction.     Can you please confirm if ok to provide this for pt?    Please advise,   Thank you

## 2024-06-06 ENCOUNTER — TELEPHONE (OUTPATIENT)
Dept: OBSTETRICS AND GYNECOLOGY | Facility: CLINIC | Age: 29
End: 2024-06-06
Payer: COMMERCIAL

## 2024-06-06 ENCOUNTER — HOSPITAL ENCOUNTER (EMERGENCY)
Facility: HOSPITAL | Age: 29
Discharge: HOME | End: 2024-06-06
Attending: OBSTETRICS & GYNECOLOGY | Admitting: OBSTETRICS & GYNECOLOGY
Payer: COMMERCIAL

## 2024-06-06 ENCOUNTER — APPOINTMENT (OUTPATIENT)
Dept: GENERAL RADIOLOGY | Facility: HOSPITAL | Age: 29
End: 2024-06-06
Payer: COMMERCIAL

## 2024-06-06 ENCOUNTER — ANESTHESIA (OUTPATIENT)
Dept: PAIN MEDICINE | Facility: HOSPITAL | Age: 29
End: 2024-06-06
Payer: COMMERCIAL

## 2024-06-06 ENCOUNTER — APPOINTMENT (OUTPATIENT)
Dept: PAIN MEDICINE | Facility: HOSPITAL | Age: 29
End: 2024-06-06
Payer: COMMERCIAL

## 2024-06-06 ENCOUNTER — ANESTHESIA EVENT (OUTPATIENT)
Dept: PAIN MEDICINE | Facility: HOSPITAL | Age: 29
End: 2024-06-06
Payer: COMMERCIAL

## 2024-06-06 VITALS
HEART RATE: 57 BPM | OXYGEN SATURATION: 99 % | BODY MASS INDEX: 28.8 KG/M2 | RESPIRATION RATE: 16 BRPM | DIASTOLIC BLOOD PRESSURE: 88 MMHG | HEIGHT: 69 IN | SYSTOLIC BLOOD PRESSURE: 157 MMHG | TEMPERATURE: 98.6 F

## 2024-06-06 DIAGNOSIS — G97.1 SPINAL HEADACHE: Primary | ICD-10-CM

## 2024-06-06 LAB
ALBUMIN SERPL-MCNC: 3.9 G/DL (ref 3.5–5.2)
ALBUMIN/GLOB SERPL: 1.4 G/DL
ALP SERPL-CCNC: 78 U/L (ref 39–117)
ALT SERPL W P-5'-P-CCNC: 17 U/L (ref 1–33)
ANION GAP SERPL CALCULATED.3IONS-SCNC: 10 MMOL/L (ref 5–15)
AST SERPL-CCNC: 17 U/L (ref 1–32)
BASOPHILS # BLD AUTO: 0.02 10*3/MM3 (ref 0–0.2)
BASOPHILS NFR BLD AUTO: 0.6 % (ref 0–1.5)
BILIRUB SERPL-MCNC: 0.3 MG/DL (ref 0–1.2)
BILIRUB UR QL STRIP: NEGATIVE
BUN SERPL-MCNC: 9 MG/DL (ref 6–20)
BUN/CREAT SERPL: 16.7 (ref 7–25)
CALCIUM SPEC-SCNC: 8.7 MG/DL (ref 8.6–10.5)
CHLORIDE SERPL-SCNC: 106 MMOL/L (ref 98–107)
CLARITY UR: CLEAR
CO2 SERPL-SCNC: 22 MMOL/L (ref 22–29)
COLOR UR: YELLOW
CREAT SERPL-MCNC: 0.54 MG/DL (ref 0.57–1)
CREAT UR-MCNC: 118.6 MG/DL
DEPRECATED RDW RBC AUTO: 48.4 FL (ref 37–54)
EGFRCR SERPLBLD CKD-EPI 2021: 127.2 ML/MIN/1.73
EOSINOPHIL # BLD AUTO: 0.09 10*3/MM3 (ref 0–0.4)
EOSINOPHIL NFR BLD AUTO: 2.7 % (ref 0.3–6.2)
ERYTHROCYTE [DISTWIDTH] IN BLOOD BY AUTOMATED COUNT: 18 % (ref 12.3–15.4)
GLOBULIN UR ELPH-MCNC: 2.7 GM/DL
GLUCOSE SERPL-MCNC: 91 MG/DL (ref 65–99)
GLUCOSE UR STRIP-MCNC: NEGATIVE MG/DL
HCT VFR BLD AUTO: 38.6 % (ref 34–46.6)
HGB BLD-MCNC: 11.6 G/DL (ref 12–15.9)
HGB UR QL STRIP.AUTO: NEGATIVE
IMM GRANULOCYTES # BLD AUTO: 0 10*3/MM3 (ref 0–0.05)
IMM GRANULOCYTES NFR BLD AUTO: 0 % (ref 0–0.5)
KETONES UR QL STRIP: NEGATIVE
LEUKOCYTE ESTERASE UR QL STRIP.AUTO: NEGATIVE
LYMPHOCYTES # BLD AUTO: 1.34 10*3/MM3 (ref 0.7–3.1)
LYMPHOCYTES NFR BLD AUTO: 40.1 % (ref 19.6–45.3)
MCH RBC QN AUTO: 23.1 PG (ref 26.6–33)
MCHC RBC AUTO-ENTMCNC: 30.1 G/DL (ref 31.5–35.7)
MCV RBC AUTO: 76.7 FL (ref 79–97)
MONOCYTES # BLD AUTO: 0.28 10*3/MM3 (ref 0.1–0.9)
MONOCYTES NFR BLD AUTO: 8.4 % (ref 5–12)
NEUTROPHILS NFR BLD AUTO: 1.61 10*3/MM3 (ref 1.7–7)
NEUTROPHILS NFR BLD AUTO: 48.2 % (ref 42.7–76)
NITRITE UR QL STRIP: NEGATIVE
NRBC BLD AUTO-RTO: 0 /100 WBC (ref 0–0.2)
PH UR STRIP.AUTO: 7.5 [PH] (ref 5–8)
PLATELET # BLD AUTO: 218 10*3/MM3 (ref 140–450)
PMV BLD AUTO: 10.2 FL (ref 6–12)
POTASSIUM SERPL-SCNC: 4 MMOL/L (ref 3.5–5.2)
PROT ?TM UR-MCNC: 10 MG/DL
PROT SERPL-MCNC: 6.6 G/DL (ref 6–8.5)
PROT UR QL STRIP: NEGATIVE
PROT/CREAT UR: 84.3 MG/G CREA (ref 0–200)
RBC # BLD AUTO: 5.03 10*6/MM3 (ref 3.77–5.28)
SODIUM SERPL-SCNC: 138 MMOL/L (ref 136–145)
SP GR UR STRIP: 1.02 (ref 1–1.03)
UROBILINOGEN UR QL STRIP: NORMAL
WBC NRBC COR # BLD AUTO: 3.34 10*3/MM3 (ref 3.4–10.8)

## 2024-06-06 PROCEDURE — 80053 COMPREHEN METABOLIC PANEL: CPT

## 2024-06-06 PROCEDURE — 99202 OFFICE O/P NEW SF 15 MIN: CPT | Performed by: OBSTETRICS & GYNECOLOGY

## 2024-06-06 PROCEDURE — 36415 COLL VENOUS BLD VENIPUNCTURE: CPT

## 2024-06-06 PROCEDURE — 84156 ASSAY OF PROTEIN URINE: CPT | Performed by: OBSTETRICS & GYNECOLOGY

## 2024-06-06 PROCEDURE — 81003 URINALYSIS AUTO W/O SCOPE: CPT | Performed by: OBSTETRICS & GYNECOLOGY

## 2024-06-06 PROCEDURE — 82570 ASSAY OF URINE CREATININE: CPT | Performed by: OBSTETRICS & GYNECOLOGY

## 2024-06-06 PROCEDURE — 77003 FLUOROGUIDE FOR SPINE INJECT: CPT

## 2024-06-06 PROCEDURE — 99284 EMERGENCY DEPT VISIT MOD MDM: CPT

## 2024-06-06 PROCEDURE — 85025 COMPLETE CBC W/AUTO DIFF WBC: CPT

## 2024-06-06 PROCEDURE — 99283 EMERGENCY DEPT VISIT LOW MDM: CPT

## 2024-06-06 RX ORDER — PRENATAL VIT NO.126/IRON/FOLIC 28MG-0.8MG
1 TABLET ORAL DAILY
COMMUNITY

## 2024-06-06 RX ORDER — MIDAZOLAM HYDROCHLORIDE 1 MG/ML
1 INJECTION INTRAMUSCULAR; INTRAVENOUS
Status: DISCONTINUED | OUTPATIENT
Start: 2024-06-06 | End: 2024-06-06 | Stop reason: HOSPADM

## 2024-06-06 RX ORDER — NAPROXEN 500 MG/1
500 TABLET ORAL 2 TIMES DAILY WITH MEALS
COMMUNITY

## 2024-06-06 RX ORDER — LABETALOL 200 MG/1
200 TABLET, FILM COATED ORAL ONCE
Status: COMPLETED | OUTPATIENT
Start: 2024-06-06 | End: 2024-06-06

## 2024-06-06 RX ORDER — LIDOCAINE HYDROCHLORIDE 10 MG/ML
1 INJECTION, SOLUTION INFILTRATION; PERINEURAL ONCE
Status: DISCONTINUED | OUTPATIENT
Start: 2024-06-06 | End: 2024-06-06 | Stop reason: HOSPADM

## 2024-06-06 RX ORDER — OXYCODONE HYDROCHLORIDE 5 MG/1
5 CAPSULE ORAL EVERY 6 HOURS PRN
Qty: 3 CAPSULE | Refills: 0 | Status: SHIPPED | OUTPATIENT
Start: 2024-06-06

## 2024-06-06 RX ORDER — FENTANYL CITRATE 50 UG/ML
50 INJECTION, SOLUTION INTRAMUSCULAR; INTRAVENOUS AS NEEDED
Status: DISCONTINUED | OUTPATIENT
Start: 2024-06-06 | End: 2024-06-06 | Stop reason: HOSPADM

## 2024-06-06 RX ADMIN — LABETALOL HYDROCHLORIDE 200 MG: 200 TABLET, FILM COATED ORAL at 11:09

## 2024-06-06 NOTE — OBED NOTES
[facility]  Osbaldo Ralph  : 1995  MRN: [id number]  CSN: [id number]    OB ED Provider Note    Subjective   Chief Complaint   Patient presents with    Headache     CLEMENCIA with complaints of PP headache. She states she feels a lot of pain in her back at the site of her epidural and the pain shoots up her spine and causes a throbbing headache.      Osbaldo Ralph is a 28 y.o. year old  s/p  on ,presenting with constant headache since discharge from hospital on . She reports the headache began suddenly once she arrived home. It is constant but worsens significantly with movement and position changes. She reports when it worsens, the pain begins in the middle of her back and radiates up into her neck and head. The headache is throbbing in nature. She has tried taking her postpartum pain meds for the headache but it has not improved. She denies vision changes. Her pregnancy was complicated by upper extremity DVT after having PICC line. She was not compliant with Lovenox.     In triage today, she is having elevated BP's up to 150's/100's, mostly 140's/80-90's. She denies h/o hypertension.       OB History    Para Term  AB Living   5 4 4 0 1 4   SAB IAB Ectopic Molar Multiple Live Births   1 0 0 0 0 4      # Outcome Date GA Lbr Wm/2nd Weight Sex Type Anes PTL Lv   5 Term 24 38w2d 10:41 / 00:07 3090 g (6 lb 13 oz) M Vag-Spont EPI N FELI      Birth Comments: Olivier in LR4      Name: Phil Gilbert      Apgar1: 9  Apgar5: 9   4 Term 23 38w1d / 00:39 2540 g (5 lb 9.6 oz) M Vag-Spont EPI  FELI      Name: DIONICIO RALPHONAL      Apgar1: 8  Apgar5: 9   3 Term 21 37w2d 17:24 / 00:25 2605 g (5 lb 11.9 oz) M Vag-Spont EPI N FELI      Name: DIONICIO RALPHONAL      Apgar1: 9  Apgar5: 9   2 Term 18 37w5d / 00:41 3005 g (6 lb 10 oz) M Vag-Spont EPI N FELI      Name: DIONICIO RALPH      Apgar1: 8  Apgar5: 9   1 2015        FD      Birth Comments:  "Suction     Past Medical History:   Diagnosis Date    Anemia     Chlamydia     years ago not during current pregnancy    Heart murmur     as a child    History of maternal deep vein thrombosis (DVT)     while pt hospitalized and had picc line during pregnancy    Hypoglycemia     Uterine fibroids affecting pregnancy, antepartum      Past Surgical History:   Procedure Laterality Date    DILATATION AND CURETTAGE      WISDOM TOOTH EXTRACTION         Current Facility-Administered Medications:     labetalol (NORMODYNE) tablet 200 mg, 200 mg, Oral, Once, Bethany Escalante MD    No Known Allergies  Social History    Tobacco Use      Smoking status: Former        Types: Cigars        Start date:         Quit date: 2023        Years since quittin.7        Passive exposure: Never      Smokeless tobacco: Never      Tobacco comments: Smoke 2 cigars a day.    Review of Systems      Objective   /85   Pulse 51   Temp 98 °F (36.7 °C) (Oral)   Resp 16   Ht 175.3 cm (69\")   LMP 2023 (Exact Date)   SpO2 99%   Breastfeeding Yes   BMI 28.80 kg/m²   General: Is uncomfortable w/ headache, has covers covering head and trying to lie still   Abdomen: soft, non-tender; no masses                   Chest: Unlabored respirations    CV:  normal rate, regular rhythm,  no murmurs, rubs, or gallops   Ext:   No C/C/E   Back: CVA tenderness is absent bilateral        Prenatal Labs  Lab Results   Component Value Date    HGB 11.6 (L) 2024    RUBELLAABIGG 1.47 2023    HEPBSAG Negative 2023    ABORH A Negative 2023    ABSCRN Positive 2024    XAQ4MIF3 Non Reactive 2023    HEPCVIRUSABY Non Reactive 2023     2024    STREPGPB Negative 2024    URINECX No growth 2024    CHLAMNAA Negative 2023    NGONORRHON Negative 2023       Current Labs Reviewed   CBC:   Lab Results   Component Value Date    WBC 3.34 (L) 2024    HGB 11.6 (L) 2024    HCT " 38.6 2024     2024     CMP:   Lab Results   Component Value Date     2024    K 4.0 2024     2024    CO2 22.0 2024    BUN 9 2024    CREATININE 0.54 (L) 2024    GLUCOSE 91 2024    ALBUMIN 3.9 2024    CALCIUM 8.7 2024    AST 17 2024    ALT 17 2024    BILITOT 0.3 2024     UA:    Lab Results   Component Value Date    SQUAMEPIUA 3-6 (A) 2024    SPECGRAVUR 1.022 2024    KETONESU Negative 2024    BLOODU Negative 2024    LEUKOCYTESUR Negative 2024    NITRITEU Negative 2024    RBCUA 3-5 (A) 2024    WBCUA 3-5 (A) 2024    BACTERIA Trace (A) 2024          Assessment    s/p  on  now with constant headache  DDx includes: postpartum pre-eclampsia, spinal headache, VTE  BP's 140-150's/'s, mostly 140's/80-90's, pre-e labs normal, no h/o HTN, will give PO dose of labetalol and pt is to follow-up in Ob office for BP check beginning of next week. She voiced understanding.  Although this pregnancy was complicated by UE DVT, pt has not h/o VTE in previous pregnancies, not currently using Lovenox  Given that headache is constant since discharge, positional in severity and unimproved w/ meds, have higher suspicion that is spinal headache, will transfer to ED for anesthesia to assess  Discussed patient w/ Dr. Michael Escalante MD  2024  11:03 EDT

## 2024-06-06 NOTE — H&P
INTERVAL HISTORY:    The patient presents for an epidural blood patch today.  They have received N/A% improvement since their last injection with a pain level of 10 /10 at its worst recently.  She had a epidural for labor and they stated it was at the L4-5 interspace but no evidence of any CSF or dural punctures.  Her headache is postural in nature.  Conservative measures tried in the interim. Daily activities are still affected by the pain.    Radiology reports and/or previous notes have been reviewed and are consistent with their diagnosis of Post-Op Diagnosis Codes:     * Post-dural puncture headache [G97.1]    Alert and oriented  MP - 2  Lungs - clear  CV - rrr    Diagnosis:  Post-Op Diagnosis Codes:     * Post-dural puncture headache [G97.1]      Plan: Lumbar epidural blood patch injection under fluoroscopic guidance        Target : L2-3    I have encouraged them to continue:  1.  Physical therapy exercises at home as prescribed by physical therapy or from the pain clinic handout (given to the patient).  Continuation of these exercises every day, or multiple times per week, even when the patient has good pain relief, was stressed to the patient as a preventative measure to decrease the frequency and severity of future pain episodes.  2.  Continue pain medicines as already prescribed.  If patient not currently taking any, it is recommended to begin Acetaminophen 1000 mg po q 8 hours.  If other medicines containing Acetaminophen are currently prescribed, maintain daily dose at 3000mg.    3.  If they can tolerate NSAIDS, it is recommended to take Ibuprofen 600 mg po q 6 hours for 7 days during pain exacerbations.   Alternatively, they may substitute an NSAID of their choice (e.g. Aleve)  4.  Heat and ice to the affected area as tolerated for pain control.  It was discussed that heating pads can cause burns.  5.  Low impact exercise such as walking or water exercise was recommended to maintain overall health and  aid in weight control.   6.  Follow up as needed for subsequent injections.  7.  Patient was counseled to abstain from tobacco products.    Time : 18   min

## 2024-06-06 NOTE — ANESTHESIA PROCEDURE NOTES
PAIN Epidural block    Pre-sedation assessment completed: 6/6/2024 1:00 PM    Patient reassessed immediately prior to procedure    Patient location during procedure: pain clinic  Start Time: 6/6/2024 1:00 PM  Stop Time: 6/6/2024 1:12 PM  Indication:procedure for pain  Performed By  Anesthesiologist: Christopher King MD  Preanesthetic Checklist  Completed: patient identified, IV checked, site marked, risks and benefits discussed, surgical consent, monitors and equipment checked, pre-op evaluation and timeout performed  Additional Notes  Dx : post dural puncture headache    Procedure : epidural blood patch    Plan : return to clinic as needed    According to the site on the scan it looks like on x-ray she actually went in at L3-4 we performed our procedure      Sedation start:                                  End:  Prep:  Pt Position:prone  Sterile Tech:cap, gloves, mask and sterile barrier  Prep:chlorhexidine gluconate and isopropyl alcohol  Monitoring:blood pressure monitoring, continuous pulse oximetry and EKG  Procedure:Sedation: no     Approach:midline  Guidance: fluoroscopy  Location:lumbar  Level:3-4  Needle Type:Tuohy  Needle Gauge:20 G  Aspiration:negative  Medications:  Comments:20cc autologous, sterile blood from the patient  Post Assessment:  Pt Tolerance:patient tolerated the procedure well with no apparent complications  Complications:no

## 2024-06-06 NOTE — TELEPHONE ENCOUNTER
Patient called after triage visit today reporting that she was evaluated and ruled out for preeclampsia. She then was sent to ED and had a blood patch. Now she has improvement of the headache but worsening back pain. She has taken Ibuprofen with no relief. Encouraged tylenol and ice to area.  Denies vision changes or RUQ pain.  She was strongly encouraged to go back to ED if HA not improved after taking tylenol and one dose of oxycodone (Jose Carlos lowery). Pt expressed understanding and agreement.

## 2024-06-06 NOTE — TELEPHONE ENCOUNTER
Pt calling to scheduled BP check for next week.   Do you and Dr. Whitaker prefer this be scheduled as ob appt? If so, is there any day she can be added on?

## 2024-06-06 NOTE — ED PROVIDER NOTES
EMERGENCY DEPARTMENT ENCOUNTER    Room Number:  SAV/SAV  Date seen:  2024  PCP: Provider, No Known  Historian/Independent historian: n/a  Chronic or social conditions impacting care: postpartum       HPI:  Chief Complaint: headache   A complete HPI/ROS/PMH/PSH/SH/FH are unobtainable due to: n/a  Context: Osbaldo Mayo is a 28 y.o. female who presents to the ED c/o headache since she was discharged after having a vaginal delivery to epidural. She reports the pain as throbbing. It is worse with movement and position changes. Associated symptoms include nausea and photophobia. No improvement with tylenol, excedrin, or ibuprofen. No fever or neck pain. No chills. She is currently breastfeeding. Evaluated by OB and they sent back to ER for blood patch.     External Medical record review:   24: labor and delivery  Assessment   s/p  on  now with constant headache  DDx includes: postpartum pre-eclampsia, spinal headache, VTE  BP's 140-150's/'s, mostly 140's/80-90's, pre-e labs normal, no h/o HTN, will give PO dose of labetalol and pt is to follow-up in Ob office for BP check beginning of next week. She voiced understanding.  Although this pregnancy was complicated by UE DVT, pt has not h/o VTE in previous pregnancies, not currently using Lovenox  Given that headache is constant since discharge, positional in severity and unimproved w/ meds, have higher suspicion that is spinal headache, will transfer to ED for anesthesia to assess  Discussed patient w/ Dr. Rodriguez      PAST MEDICAL HISTORY  Active Ambulatory Problems     Diagnosis Date Noted    Hyperemesis gravidarum 10/16/2023    Normal labor and delivery 2024     Resolved Ambulatory Problems     Diagnosis Date Noted    Pregnancy 2023     Past Medical History:   Diagnosis Date    Anemia     Chlamydia     Heart murmur     History of maternal deep vein thrombosis (DVT)     Hypoglycemia     Uterine fibroids affecting pregnancy,  antepartum          PAST SURGICAL HISTORY  Past Surgical History:   Procedure Laterality Date    DILATATION AND CURETTAGE      WISDOM TOOTH EXTRACTION           FAMILY HISTORY  Family History   Problem Relation Age of Onset    Hypertension Mother     Diabetes Mother     No Known Problems Father     Asthma Sister     No Known Problems Sister     No Known Problems Sister     No Known Problems Sister     Asthma Brother     No Known Problems Brother     No Known Problems Maternal Grandmother     Cancer Maternal Grandfather     Breast cancer Paternal Grandmother     No Known Problems Paternal Grandfather     Cancer Other         Breat    Diabetes Other     Hypertension Other     Anesthesia problems Neg Hx     Broken bones Neg Hx     Clotting disorder Neg Hx     Collagen disease Neg Hx     Dislocations Neg Hx     Osteoporosis Neg Hx     Rheumatologic disease Neg Hx     Scoliosis Neg Hx     Severe sprains Neg Hx          SOCIAL HISTORY  Social History     Socioeconomic History    Marital status: Single    Number of children: 3    Highest education level: High school graduate   Tobacco Use    Smoking status: Former     Types: Cigars     Start date:      Quit date: 2023     Years since quittin.7     Passive exposure: Never    Smokeless tobacco: Never    Tobacco comments:     Smoke 2 cigars a day.   Vaping Use    Vaping status: Never Used   Substance and Sexual Activity    Alcohol use: Not Currently    Drug use: Not Currently    Sexual activity: Yes     Birth control/protection: None         ALLERGIES  Patient has no known allergies.        REVIEW OF SYSTEMS  Per HPI, otherwise negative.       PHYSICAL EXAM  ED Triage Vitals   Temp Heart Rate Resp BP SpO2   24 0911 24 0911 24 0911 24 0916 24 0911   98 °F (36.7 °C) 106 16 (S) (!) 147/102 98 %      Temp src Heart Rate Source Patient Position BP Location FiO2 (%)   24 0911 24 0911 24 0916 24 0916 --   Tympanic  Monitor Sitting Right arm        Physical Exam  Vitals and nursing note reviewed.   Constitutional:       General: She is in acute distress.      Appearance: Normal appearance.   HENT:      Head: Normocephalic and atraumatic.      Mouth/Throat:      Mouth: Mucous membranes are moist.   Eyes:      Extraocular Movements: Extraocular movements intact.      Conjunctiva/sclera: Conjunctivae normal.      Pupils: Pupils are equal, round, and reactive to light.   Cardiovascular:      Rate and Rhythm: Normal rate and regular rhythm.      Pulses: Normal pulses.      Heart sounds: Normal heart sounds.   Pulmonary:      Effort: Pulmonary effort is normal.      Breath sounds: Normal breath sounds. No wheezing.   Abdominal:      General: Bowel sounds are normal.      Palpations: Abdomen is soft.   Musculoskeletal:      Right lower leg: No edema.      Left lower leg: No edema.   Skin:     General: Skin is warm.      Capillary Refill: Capillary refill takes less than 2 seconds.   Neurological:      Mental Status: She is alert and oriented to person, place, and time. Mental status is at baseline.   Psychiatric:         Mood and Affect: Mood normal.               LAB RESULTS  Recent Results (from the past 24 hour(s))   Comprehensive Metabolic Panel    Collection Time: 06/06/24  9:28 AM    Specimen: Blood   Result Value Ref Range    Glucose 91 65 - 99 mg/dL    BUN 9 6 - 20 mg/dL    Creatinine 0.54 (L) 0.57 - 1.00 mg/dL    Sodium 138 136 - 145 mmol/L    Potassium 4.0 3.5 - 5.2 mmol/L    Chloride 106 98 - 107 mmol/L    CO2 22.0 22.0 - 29.0 mmol/L    Calcium 8.7 8.6 - 10.5 mg/dL    Total Protein 6.6 6.0 - 8.5 g/dL    Albumin 3.9 3.5 - 5.2 g/dL    ALT (SGPT) 17 1 - 33 U/L    AST (SGOT) 17 1 - 32 U/L    Alkaline Phosphatase 78 39 - 117 U/L    Total Bilirubin 0.3 0.0 - 1.2 mg/dL    Globulin 2.7 gm/dL    A/G Ratio 1.4 g/dL    BUN/Creatinine Ratio 16.7 7.0 - 25.0    Anion Gap 10.0 5.0 - 15.0 mmol/L    eGFR 127.2 >60.0 mL/min/1.73   CBC Auto  Differential    Collection Time: 06/06/24  9:28 AM    Specimen: Blood   Result Value Ref Range    WBC 3.34 (L) 3.40 - 10.80 10*3/mm3    RBC 5.03 3.77 - 5.28 10*6/mm3    Hemoglobin 11.6 (L) 12.0 - 15.9 g/dL    Hematocrit 38.6 34.0 - 46.6 %    MCV 76.7 (L) 79.0 - 97.0 fL    MCH 23.1 (L) 26.6 - 33.0 pg    MCHC 30.1 (L) 31.5 - 35.7 g/dL    RDW 18.0 (H) 12.3 - 15.4 %    RDW-SD 48.4 37.0 - 54.0 fl    MPV 10.2 6.0 - 12.0 fL    Platelets 218 140 - 450 10*3/mm3    Neutrophil % 48.2 42.7 - 76.0 %    Lymphocyte % 40.1 19.6 - 45.3 %    Monocyte % 8.4 5.0 - 12.0 %    Eosinophil % 2.7 0.3 - 6.2 %    Basophil % 0.6 0.0 - 1.5 %    Immature Grans % 0.0 0.0 - 0.5 %    Neutrophils, Absolute 1.61 (L) 1.70 - 7.00 10*3/mm3    Lymphocytes, Absolute 1.34 0.70 - 3.10 10*3/mm3    Monocytes, Absolute 0.28 0.10 - 0.90 10*3/mm3    Eosinophils, Absolute 0.09 0.00 - 0.40 10*3/mm3    Basophils, Absolute 0.02 0.00 - 0.20 10*3/mm3    Immature Grans, Absolute 0.00 0.00 - 0.05 10*3/mm3    nRBC 0.0 0.0 - 0.2 /100 WBC   Urinalysis With Microscopic If Indicated (No Culture) - Urine, Clean Catch    Collection Time: 06/06/24 10:14 AM    Specimen: Urine, Clean Catch   Result Value Ref Range    Color, UA Yellow Yellow, Straw    Appearance, UA Clear Clear    pH, UA 7.5 5.0 - 8.0    Specific Gravity, UA 1.022 1.005 - 1.030    Glucose, UA Negative Negative    Ketones, UA Negative Negative    Bilirubin, UA Negative Negative    Blood, UA Negative Negative    Protein, UA Negative Negative    Leuk Esterase, UA Negative Negative    Nitrite, UA Negative Negative    Urobilinogen, UA 1.0 E.U./dL 0.2 - 1.0 E.U./dL   Protein / Creatinine Ratio, Urine - Urine, Clean Catch    Collection Time: 06/06/24 10:14 AM    Specimen: Urine, Clean Catch   Result Value Ref Range    Protein/Creatinine Ratio, Urine 84.3 0.0 - 200.0 mg/G Crea    Creatinine, Urine 118.6 mg/dL    Total Protein, Urine 10.0 mg/dL       Ordered the above labs and reviewed the results.        RADIOLOGY  FL  Guided Pain Management Spine    Result Date: 6/6/2024  This procedure was auto-finalized with no dictation required.     Ordered the above noted radiological studies. Reviewed by me in PACS.        MEDICATIONS GIVEN IN ER  Medications   labetalol (NORMODYNE) tablet 200 mg (200 mg Oral Given 6/6/24 1109)           MEDICAL DECISION MAKING, PROGRESS, and CONSULTS    All labs have been independently reviewed by me.  All radiology studies have been reviewed by me and I have also reviewed the radiology report.   EKG's independently viewed and interpreted by me.  Discussion below represents my analysis of pertinent findings related to patient's condition, differential diagnosis, treatment plan and final disposition.    27 y/o female who presents with severe headache since discharge after delivery. Seen by labor and delivery and cleared for eclampsia. Headache severe. Anesthesia consulted and blood patch placed. Symptoms improved.           Shared decision making/consideration for admission:  stable for discharge      Orders placed during this visit:  Orders Placed This Encounter   Procedures    Epidural Blood Patch By Anesthesiologist    FL Guided Pain Management Spine    Comprehensive Metabolic Panel    CBC Auto Differential    Urinalysis With Microscopic If Indicated (No Culture) - Urine, Clean Catch    Protein / Creatinine Ratio, Urine - Urine, Clean Catch    Inpatient Anesthesia Pain Management Clinic Consult    Transfer Patient    CBC & Differential         Differential diagnosis include, but not limited to: spinal headache, complicated migraine, meningitis, eclampsia       Additional orders considered but not ordered: Toradol     Independent interpretation of labs, radiology studies, and discussions with consultants:    Discussed with Dr. Paredes, who agrees with plan.     ED Course as of 06/07/24 0025   u Jun 06, 2024   1203 WBC(!): 3.34 [JG]   1203 RBC: 5.03 [JG]   1203 Hemoglobin(!): 11.6 [JG]   1203 Hematocrit:  38.6 [JG]   1203 MCV(!): 76.7 [JG]   1203 MCH(!): 23.1 [JG]   1203 MCHC(!): 30.1 [JG]   1203 RDW(!): 18.0 [JG]   1203 Platelets: 218 [JG]   1203 BUN: 9 [JG]   1203 Creatinine(!): 0.54 [JG]   1203 Sodium: 138 [JG]   1203 Potassium: 4.0 [JG]   1203 ALT (SGPT): 17 [JG]   1203 AST (SGOT): 17 [JG]   1210 Discussed with Rachael with pain management, they will send for patient to come over for blood patch. Patient has someone who will be able to drive her home. No other recommendations. Patient updated and agreeable.  [JG]   1238 Patient taken over for blood patch and will be discharged from their care.  [JG]   1409 Patient was discharged from anesthesia, but was brought back to ER because she left her shoes. Patient symptoms improved and she is ambulatory.  [JG]      ED Course User Index  [JG] Thu Boyd APRN             DIAGNOSIS  Final diagnoses:   Spinal headache         DISPOSITION  Discharge         Latest Documented Vital Signs:  As of 00:25 EDT  BP- 157/88 HR- 57 Temp- 98.6 °F (37 °C) (Infrared) O2 sat- 99%              --    Please note that portions of this were completed with a voice recognition program.       Note Disclaimer: At [facility], we believe that sharing information builds trust and better relationships. You are receiving this note because you are receiving care at [facility] or recently visited. It is possible you will see health information before a provider has talked with you about it. This kind of information can be easy to misunderstand. To help you fully understand what it means for your health, we urge you to discuss this note with your provider.             Thu Boyd APRN  06/07/24 0028

## 2024-06-06 NOTE — ED NOTES
Pt gave birth - vag delivery - on 5/20.  She had epidural and has ha  since despite taking tyl, ibu and aleve

## 2024-07-02 ENCOUNTER — TELEPHONE (OUTPATIENT)
Dept: OBSTETRICS AND GYNECOLOGY | Facility: CLINIC | Age: 29
End: 2024-07-02
Payer: COMMERCIAL

## 2024-07-02 NOTE — TELEPHONE ENCOUNTER
Patient delivered 5/20. PP appointment scheduled 7/11. Patient stopped bleeding around one week ago. Since then she has noticed an odor. No discharge. Do want her to be seen sooner? Pt Ph [phone]

## 2024-07-02 NOTE — TELEPHONE ENCOUNTER
Patient is scheduled on Tuesday the 11 th. 7-5-24/lw    I tired calling the patient again. There was no answer or a voice mail available. Thr line just rang and rang. 7-3-24/lw    I tried calling the patient, there was no answer and a recording stated this line is not accepting calls at this time. I will try again. 7-2-24/LW

## 2024-08-21 ENCOUNTER — TELEPHONE (OUTPATIENT)
Dept: URGENT CARE | Facility: CLINIC | Age: 29
End: 2024-08-21
Payer: COMMERCIAL

## 2024-10-11 ENCOUNTER — HOSPITAL ENCOUNTER (EMERGENCY)
Facility: HOSPITAL | Age: 29
Discharge: HOME | End: 2024-10-11
Attending: EMERGENCY MEDICINE
Payer: COMMERCIAL

## 2024-10-11 VITALS
SYSTOLIC BLOOD PRESSURE: 114 MMHG | WEIGHT: 185 LBS | HEIGHT: 69 IN | TEMPERATURE: 98.2 F | DIASTOLIC BLOOD PRESSURE: 73 MMHG | RESPIRATION RATE: 16 BRPM | HEART RATE: 71 BPM | OXYGEN SATURATION: 100 % | BODY MASS INDEX: 27.4 KG/M2

## 2024-10-11 DIAGNOSIS — R42 EPISODIC LIGHTHEADEDNESS: ICD-10-CM

## 2024-10-11 DIAGNOSIS — D64.9 ACUTE ON CHRONIC ANEMIA: ICD-10-CM

## 2024-10-11 DIAGNOSIS — R53.1 GENERALIZED WEAKNESS: Primary | ICD-10-CM

## 2024-10-11 LAB
ABO GROUP BLD: NORMAL
ALBUMIN SERPL-MCNC: 3.8 G/DL (ref 3.5–5.2)
ALBUMIN/GLOB SERPL: 1.6 G/DL
ALP SERPL-CCNC: 72 U/L (ref 39–117)
ALT SERPL W P-5'-P-CCNC: 7 U/L (ref 1–33)
ANION GAP SERPL CALCULATED.3IONS-SCNC: 9.6 MMOL/L (ref 5–15)
AST SERPL-CCNC: 10 U/L (ref 1–32)
BASOPHILS # BLD AUTO: 0.01 10*3/MM3 (ref 0–0.2)
BASOPHILS NFR BLD AUTO: 0.3 % (ref 0–1.5)
BILIRUB SERPL-MCNC: 0.2 MG/DL (ref 0–1.2)
BLD GP AB SCN SERPL QL: NEGATIVE
BUN SERPL-MCNC: 11 MG/DL (ref 6–20)
BUN/CREAT SERPL: 20.4 (ref 7–25)
CALCIUM SPEC-SCNC: 8.7 MG/DL (ref 8.6–10.5)
CHLORIDE SERPL-SCNC: 107 MMOL/L (ref 98–107)
CO2 SERPL-SCNC: 24.4 MMOL/L (ref 22–29)
CREAT SERPL-MCNC: 0.54 MG/DL (ref 0.57–1)
DEPRECATED RDW RBC AUTO: 37.7 FL (ref 37–54)
EGFRCR SERPLBLD CKD-EPI 2021: 127.2 ML/MIN/1.73
EOSINOPHIL # BLD AUTO: 0.09 10*3/MM3 (ref 0–0.4)
EOSINOPHIL NFR BLD AUTO: 2.4 % (ref 0.3–6.2)
ERYTHROCYTE [DISTWIDTH] IN BLOOD BY AUTOMATED COUNT: 12.5 % (ref 12.3–15.4)
GLOBULIN UR ELPH-MCNC: 2.4 GM/DL
GLUCOSE SERPL-MCNC: 101 MG/DL (ref 65–99)
HCG SERPL QL: NEGATIVE
HCT VFR BLD AUTO: 33.4 % (ref 34–46.6)
HGB BLD-MCNC: 10.5 G/DL (ref 12–15.9)
IMM GRANULOCYTES # BLD AUTO: 0 10*3/MM3 (ref 0–0.05)
IMM GRANULOCYTES NFR BLD AUTO: 0 % (ref 0–0.5)
LYMPHOCYTES # BLD AUTO: 1.71 10*3/MM3 (ref 0.7–3.1)
LYMPHOCYTES NFR BLD AUTO: 45.8 % (ref 19.6–45.3)
MCH RBC QN AUTO: 26.3 PG (ref 26.6–33)
MCHC RBC AUTO-ENTMCNC: 31.4 G/DL (ref 31.5–35.7)
MCV RBC AUTO: 83.7 FL (ref 79–97)
MONOCYTES # BLD AUTO: 0.34 10*3/MM3 (ref 0.1–0.9)
MONOCYTES NFR BLD AUTO: 9.1 % (ref 5–12)
NEUTROPHILS NFR BLD AUTO: 1.58 10*3/MM3 (ref 1.7–7)
NEUTROPHILS NFR BLD AUTO: 42.4 % (ref 42.7–76)
NRBC BLD AUTO-RTO: 0 /100 WBC (ref 0–0.2)
PLATELET # BLD AUTO: 243 10*3/MM3 (ref 140–450)
PMV BLD AUTO: 10.2 FL (ref 6–12)
POTASSIUM SERPL-SCNC: 3.7 MMOL/L (ref 3.5–5.2)
PROT SERPL-MCNC: 6.2 G/DL (ref 6–8.5)
QT INTERVAL: 421 MS
QTC INTERVAL: 410 MS
RBC # BLD AUTO: 3.99 10*6/MM3 (ref 3.77–5.28)
RH BLD: NEGATIVE
SODIUM SERPL-SCNC: 141 MMOL/L (ref 136–145)
T&S EXPIRATION DATE: NORMAL
WBC NRBC COR # BLD AUTO: 3.73 10*3/MM3 (ref 3.4–10.8)

## 2024-10-11 PROCEDURE — 93010 ELECTROCARDIOGRAM REPORT: CPT | Performed by: INTERNAL MEDICINE

## 2024-10-11 PROCEDURE — 85025 COMPLETE CBC W/AUTO DIFF WBC: CPT | Performed by: EMERGENCY MEDICINE

## 2024-10-11 PROCEDURE — 86850 RBC ANTIBODY SCREEN: CPT | Performed by: EMERGENCY MEDICINE

## 2024-10-11 PROCEDURE — 93005 ELECTROCARDIOGRAM TRACING: CPT | Performed by: EMERGENCY MEDICINE

## 2024-10-11 PROCEDURE — 80053 COMPREHEN METABOLIC PANEL: CPT | Performed by: EMERGENCY MEDICINE

## 2024-10-11 PROCEDURE — 86901 BLOOD TYPING SEROLOGIC RH(D): CPT | Performed by: EMERGENCY MEDICINE

## 2024-10-11 PROCEDURE — 25810000003 SODIUM CHLORIDE 0.9 % SOLUTION: Performed by: EMERGENCY MEDICINE

## 2024-10-11 PROCEDURE — 99283 EMERGENCY DEPT VISIT LOW MDM: CPT

## 2024-10-11 PROCEDURE — 84703 CHORIONIC GONADOTROPIN ASSAY: CPT | Performed by: EMERGENCY MEDICINE

## 2024-10-11 PROCEDURE — 86900 BLOOD TYPING SEROLOGIC ABO: CPT | Performed by: EMERGENCY MEDICINE

## 2024-10-11 RX ORDER — SODIUM CHLORIDE 0.9 % (FLUSH) 0.9 %
10 SYRINGE (ML) INJECTION AS NEEDED
Status: DISCONTINUED | OUTPATIENT
Start: 2024-10-11 | End: 2024-10-11 | Stop reason: HOSPADM

## 2024-10-11 RX ADMIN — SODIUM CHLORIDE 1000 ML: 9 INJECTION, SOLUTION INTRAVENOUS at 04:42

## 2024-10-11 NOTE — DISCHARGE INSTRUCTIONS
Follow closely with either your OB/GYN or primary care for further evaluation and management of your ongoing anemia.  Drink plenty of fluids and return to the ED if changes.

## 2024-10-11 NOTE — ED PROVIDER NOTES
EMERGENCY DEPARTMENT ENCOUNTER    Room Number:  13/13  PCP: Provider, No Known  Historian: Patient      HPI:  Chief Complaint: Generalized weakness  A complete HPI/ROS/PMH/PSH/SH/FH are unobtainable due to: None  Context: Osbaldo Mayo is a 28 y.o. female who presents to the ED c/o generalized weakness as well as reported lightheadedness/dizziness with standing from a seated position.  She states that this has happened before and is sometimes related to her ongoing anemia.  She used to be on iron supplementation but has not been on that now in some time.  She denies headache, chest pain, shortness of breath, nausea/vomiting, or fever/chills.  She does report that the symptoms are better with rest and certainly moderate in intensity with standing and walking.            PAST MEDICAL HISTORY  Active Ambulatory Problems     Diagnosis Date Noted    Hyperemesis gravidarum 10/16/2023    Normal labor and delivery 05/20/2024     Resolved Ambulatory Problems     Diagnosis Date Noted    Pregnancy 11/05/2023     Past Medical History:   Diagnosis Date    Anemia     Chlamydia     Heart murmur     History of maternal deep vein thrombosis (DVT)     Hypoglycemia     Uterine fibroids affecting pregnancy, antepartum          PAST SURGICAL HISTORY  Past Surgical History:   Procedure Laterality Date    DILATATION AND CURETTAGE      WISDOM TOOTH EXTRACTION           FAMILY HISTORY  Family History   Problem Relation Age of Onset    Hypertension Mother     Diabetes Mother     No Known Problems Father     Asthma Sister     No Known Problems Sister     No Known Problems Sister     No Known Problems Sister     Asthma Brother     No Known Problems Brother     No Known Problems Maternal Grandmother     Cancer Maternal Grandfather     Breast cancer Paternal Grandmother     No Known Problems Paternal Grandfather     Cancer Other         Breat    Diabetes Other     Hypertension Other     Anesthesia problems Neg Hx     Broken bones Neg Hx      Clotting disorder Neg Hx     Collagen disease Neg Hx     Dislocations Neg Hx     Osteoporosis Neg Hx     Rheumatologic disease Neg Hx     Scoliosis Neg Hx     Severe sprains Neg Hx          SOCIAL HISTORY  Social History     Socioeconomic History    Marital status: Single    Number of children: 3    Highest education level: High school graduate   Tobacco Use    Smoking status: Former     Types: Cigars     Start date:      Quit date: 2023     Years since quittin.0     Passive exposure: Never    Smokeless tobacco: Never    Tobacco comments:     Smoke 2 cigars a day.   Vaping Use    Vaping status: Every Day    Last attempt to quit: 2023    Substances: Flavoring    Devices: Disposable   Substance and Sexual Activity    Alcohol use: Not Currently    Drug use: Not Currently    Sexual activity: Yes     Birth control/protection: None         ALLERGIES  Patient has no known allergies.        REVIEW OF SYSTEMS  Review of Systems   Constitutional:  Negative for fever.   HENT:  Negative for sore throat.    Eyes: Negative.    Respiratory:  Negative for cough and shortness of breath.    Cardiovascular:  Negative for chest pain.   Gastrointestinal:  Negative for abdominal pain, diarrhea and vomiting.   Genitourinary:  Negative for dysuria.   Musculoskeletal:  Negative for neck pain.   Skin:  Negative for rash.   Allergic/Immunologic: Negative.    Neurological:  Positive for dizziness, weakness and light-headedness. Negative for numbness and headaches.        Near syncope   Hematological: Negative.    Psychiatric/Behavioral: Negative.     All other systems reviewed and are negative.         PHYSICAL EXAM  ED Triage Vitals [10/11/24 0405]   Temp Heart Rate Resp BP SpO2   98.2 °F (36.8 °C) 62 16 125/80 100 %      Temp src Heart Rate Source Patient Position BP Location FiO2 (%)   -- -- -- -- --       Physical Exam  Constitutional:       General: She is not in acute distress.     Appearance: Normal appearance. She is  not ill-appearing or toxic-appearing.   HENT:      Head: Normocephalic and atraumatic.   Eyes:      Extraocular Movements: Extraocular movements intact.      Pupils: Pupils are equal, round, and reactive to light.   Cardiovascular:      Rate and Rhythm: Normal rate and regular rhythm.      Heart sounds: No murmur heard.     No friction rub. No gallop.   Pulmonary:      Effort: Pulmonary effort is normal.      Breath sounds: Normal breath sounds.   Abdominal:      General: Abdomen is flat. There is no distension.      Palpations: Abdomen is soft.      Tenderness: There is no abdominal tenderness.   Musculoskeletal:         General: No swelling or tenderness. Normal range of motion.      Cervical back: Normal range of motion and neck supple.   Skin:     General: Skin is warm and dry.   Neurological:      General: No focal deficit present.      Mental Status: She is alert and oriented to person, place, and time.      Sensory: No sensory deficit.      Motor: No weakness.   Psychiatric:         Mood and Affect: Mood normal.         Behavior: Behavior normal.           Vital signs and nursing notes reviewed.          LAB RESULTS  No results found for this or any previous visit (from the past 24 hours).      Ordered the above labs and reviewed the results.        RADIOLOGY  No Radiology Exams Resulted Within Past 24 Hours    Ordered the above noted radiological studies. Reviewed by me in PACS.            PROCEDURES  Procedures    EKG independently interpreted by myself as follows:    EKG          EKG time: 0414  Rhythm/Rate: NSR, 57  P waves and CT: nml  QRS, axis: nml, nml   ST and T waves: nml     Interpreted Contemporaneously by me, independently viewed  unchanged compared to prior 11/13/23            MEDICATIONS GIVEN IN ER  Medications   sodium chloride 0.9 % bolus 1,000 mL (0 mL Intravenous Stopped 10/11/24 0720)                   MEDICAL DECISION MAKING, PROGRESS, and CONSULTS    All labs have been independently  reviewed by me.  All radiology studies have been reviewed by me and I have also reviewed the radiology report.   EKG's independently viewed and interpreted by me.  Discussion below represents my analysis of pertinent findings related to patient's condition, differential diagnosis, treatment plan and final disposition.      Additional sources:  - Discussed/ obtained information from independent historians: History obtained from the patient herself at bedside.    - External (non-ED) record review: Upon medical records review, the patient was last seen and evaluated in the hospital as an inpatient by OB/GYN on 5/20/2024 for labor.    - Chronic or social conditions impacting care: Reported history of chronic anemia    - Shared decision making: Discharge decision based on shared conversations have between myself as well as the patient at bedside.      Orders placed during this visit:  Orders Placed This Encounter   Procedures    Comprehensive Metabolic Panel    hCG, Serum, Qualitative    CBC Auto Differential    ECG 12 Lead Other; near syncope    Type & Screen    CBC & Differential           Differential diagnosis includes but is not limited to:    Volume depletion, cardiac rhythm disturbance, malnutrition, acute anemia, acute renal insufficiency, dehydration, or severe electrolyte disturbance      Independent interpretation of labs, radiology studies, and discussions with consultants:    Laboratory values independently interpreted by myself with my interpretation showing a slightly low but stable hemoglobin/hematocrit at 10.5 and 33.4 respectively.        ED Course as of 10/12/24 2026   Fri Oct 11, 2024   0631 On reevaluation, the patient is resting comfortably but really just reports more generalized fatigue at this point.  Examination is benign.  She has yet to receive very much fluid which we will continue to try and infuse.  I did inform her that her hemoglobin is at 10.5 and that she should definitely talk with her  doctor about iron supplementations and follow-up.  We will fluid hydrate and then she will be stable for discharge to rest at home today.  She agrees and all questions answered. [BM]      ED Course User Index  [BM] Siddharth Hua MD           DIAGNOSIS  Final diagnoses:   Generalized weakness   Episodic lightheadedness   Acute on chronic anemia         DISPOSITION  DISCHARGE    Patient discharged in stable condition.    Reviewed implications of results, diagnosis, meds, responsibility to follow up, warning signs and symptoms of possible worsening, potential complications and reasons to return to ER.    Patient/Family voiced understanding of above instructions.    Discussed plan for discharge, as there is no emergent indication for admission. Patient referred to primary care provider for BP management due to today's BP. Pt/family is agreeable and understands need for follow up and repeat testing.  Pt is aware that discharge does not mean that nothing is wrong but it indicates no emergency is present that requires admission and they must continue care with follow-up as given below or physician of their choice.     FOLLOW-UP  [facility]  [address] [place] [id number] [phone]  Schedule an appointment as soon as possible for a visit            Medication List      No changes were made to your prescriptions during this visit.                   Latest Documented Vital Signs:  As of 20:26 EDT  BP- 114/73 HR- 71 Temp- 98.2 °F (36.8 °C) O2 sat- 100%              --    Please note that portions of this were completed with a voice recognition program.       Note Disclaimer: At [facility], we believe that sharing information builds trust and better relationships. You are receiving this note because you are receiving care at [facility] or recently visited. It is possible you will see health information before a provider has talked with you about it.  This kind of information can be easy to misunderstand. To help you fully understand what it means for your health, we urge you to discuss this note with your provider.             Siddharth Hua MD  10/12/24 2028

## 2024-10-13 ENCOUNTER — HOSPITAL ENCOUNTER (EMERGENCY)
Facility: HOSPITAL | Age: 29
Discharge: HOME | End: 2024-10-13
Attending: EMERGENCY MEDICINE | Admitting: EMERGENCY MEDICINE
Payer: COMMERCIAL

## 2024-10-13 VITALS
HEART RATE: 85 BPM | TEMPERATURE: 97.3 F | WEIGHT: 185 LBS | DIASTOLIC BLOOD PRESSURE: 92 MMHG | BODY MASS INDEX: 27.4 KG/M2 | SYSTOLIC BLOOD PRESSURE: 137 MMHG | HEIGHT: 69 IN | OXYGEN SATURATION: 98 % | RESPIRATION RATE: 18 BRPM

## 2024-10-13 DIAGNOSIS — H57.89 IRRITATION OF RIGHT EYE: Primary | ICD-10-CM

## 2024-10-13 DIAGNOSIS — S00.81XA ABRASION, FACE W/O INFECTION: ICD-10-CM

## 2024-10-13 DIAGNOSIS — S40.812A ABRASION OF LEFT UPPER EXTREMITY, INITIAL ENCOUNTER: ICD-10-CM

## 2024-10-13 PROCEDURE — 99283 EMERGENCY DEPT VISIT LOW MDM: CPT

## 2024-10-13 RX ORDER — PROPARACAINE HYDROCHLORIDE 5 MG/ML
2 SOLUTION/ DROPS OPHTHALMIC ONCE
Status: COMPLETED | OUTPATIENT
Start: 2024-10-13 | End: 2024-10-13

## 2024-10-13 RX ORDER — POLYETHYLENE GLYCOL 400 AND PROPYLENE GLYCOL 4; 3 MG/ML; MG/ML
2 GEL OPHTHALMIC EVERY 4 HOURS PRN
Qty: 8 ML | Refills: 0 | Status: SHIPPED | OUTPATIENT
Start: 2024-10-13

## 2024-10-13 RX ADMIN — PROPARACAINE HYDROCHLORIDE 2 DROP: 5 SOLUTION/ DROPS OPHTHALMIC at 06:06

## 2024-10-13 RX ADMIN — FLUORESCEIN SODIUM 1 STRIP: 1 STRIP OPHTHALMIC at 06:06
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
## 2024-12-22 ENCOUNTER — HOSPITAL ENCOUNTER (OUTPATIENT)
Facility: HOSPITAL | Age: 29
Discharge: HOME | End: 2024-12-22
Attending: EMERGENCY MEDICINE | Admitting: EMERGENCY MEDICINE
Payer: COMMERCIAL

## 2024-12-22 VITALS
DIASTOLIC BLOOD PRESSURE: 77 MMHG | WEIGHT: 184 LBS | SYSTOLIC BLOOD PRESSURE: 138 MMHG | HEIGHT: 69 IN | OXYGEN SATURATION: 99 % | HEART RATE: 66 BPM | RESPIRATION RATE: 16 BRPM | BODY MASS INDEX: 27.25 KG/M2 | TEMPERATURE: 97 F

## 2024-12-22 DIAGNOSIS — N39.0 ACUTE UTI (URINARY TRACT INFECTION): Primary | ICD-10-CM

## 2024-12-22 LAB
BACTERIA UR QL AUTO: ABNORMAL /HPF
BILIRUB UR QL STRIP: NEGATIVE
CLARITY UR: ABNORMAL
COLOR UR: YELLOW
GLUCOSE UR STRIP-MCNC: NEGATIVE MG/DL
HGB UR QL STRIP.AUTO: ABNORMAL
HOLD SPECIMEN: NORMAL
HYALINE CASTS UR QL AUTO: ABNORMAL /LPF
KETONES UR QL STRIP: ABNORMAL
LEUKOCYTE ESTERASE UR QL STRIP.AUTO: ABNORMAL
NITRITE UR QL STRIP: NEGATIVE
PH UR STRIP.AUTO: 6 [PH] (ref 5–8)
PROT UR QL STRIP: ABNORMAL
RBC # UR STRIP: ABNORMAL /HPF
REF LAB TEST METHOD: ABNORMAL
SP GR UR STRIP: >=1.03 (ref 1–1.03)
SQUAMOUS #/AREA URNS HPF: ABNORMAL /HPF
UROBILINOGEN UR QL STRIP: ABNORMAL
WBC # UR STRIP: ABNORMAL /HPF

## 2024-12-22 PROCEDURE — 87798 DETECT AGENT NOS DNA AMP: CPT | Performed by: EMERGENCY MEDICINE

## 2024-12-22 PROCEDURE — G0463 HOSPITAL OUTPT CLINIC VISIT: HCPCS | Performed by: EMERGENCY MEDICINE

## 2024-12-22 PROCEDURE — 87186 SC STD MICRODIL/AGAR DIL: CPT | Performed by: EMERGENCY MEDICINE

## 2024-12-22 PROCEDURE — 87077 CULTURE AEROBIC IDENTIFY: CPT | Performed by: EMERGENCY MEDICINE

## 2024-12-22 PROCEDURE — 87801 DETECT AGNT MULT DNA AMPLI: CPT | Performed by: EMERGENCY MEDICINE

## 2024-12-22 PROCEDURE — 25010000002 LIDOCAINE PF 1% 1 % SOLUTION 5 ML VIAL: Performed by: EMERGENCY MEDICINE

## 2024-12-22 PROCEDURE — 81001 URINALYSIS AUTO W/SCOPE: CPT | Performed by: EMERGENCY MEDICINE

## 2024-12-22 PROCEDURE — 87086 URINE CULTURE/COLONY COUNT: CPT | Performed by: EMERGENCY MEDICINE

## 2024-12-22 PROCEDURE — 25010000002 CEFTRIAXONE PER 250 MG: Performed by: EMERGENCY MEDICINE

## 2024-12-22 RX ORDER — DOXYCYCLINE 100 MG/1
100 CAPSULE ORAL 2 TIMES DAILY
Qty: 14 CAPSULE | Refills: 0 | Status: SHIPPED | OUTPATIENT
Start: 2024-12-22 | End: 2024-12-29

## 2024-12-22 RX ADMIN — LIDOCAINE HYDROCHLORIDE 500 MG: 10 INJECTION, SOLUTION EPIDURAL; INFILTRATION; INTRACAUDAL; PERINEURAL at 09:33

## 2024-12-22 NOTE — Clinical Note
[facility]  [address]  [place] [address]    Osbaldo Mayo was seen and treated in our emergency department on 12/22/2024.  She may return to work on 12/24/2024.         Thank you for choosing [facility].    Dano Schneider MD

## 2024-12-22 NOTE — FSED PROVIDER NOTE
EMERGENCY DEPARTMENT ENCOUNTER    Room Number:  10/10  Date seen:  12/30/2024  Time seen: 09:26 EST  PCP: Provider, No Known  Historian:     Discussed/obtained information from independent historians:     HPI:    Patient is a very nice 29-year-old female.  She presents with a several day history of discolored vaginal discharge.  She does describe some vaginal burning with urination.  No abdominal pain, no flank pain.  No fever no chills.  Of note she did have unprotected sex approximately 10 days ago.  She is concerned about possible STD exposure.  She is not known to be pregnant    External (non-ED) record review:        Chronic or social conditions impacting care:    ALLERGIES  Patient has no known allergies.    PAST MEDICAL HISTORY  Active Ambulatory Problems     Diagnosis Date Noted    Hyperemesis gravidarum 10/16/2023    Normal labor and delivery 05/20/2024     Resolved Ambulatory Problems     Diagnosis Date Noted    Pregnancy 11/05/2023     Past Medical History:   Diagnosis Date    Anemia     Chlamydia     Heart murmur     History of maternal deep vein thrombosis (DVT)     Hypoglycemia     Uterine fibroids affecting pregnancy, antepartum        PAST SURGICAL HISTORY  Past Surgical History:   Procedure Laterality Date    DILATATION AND CURETTAGE      WISDOM TOOTH EXTRACTION         FAMILY HISTORY  Family History   Problem Relation Age of Onset    Hypertension Mother     Diabetes Mother     No Known Problems Father     Asthma Sister     No Known Problems Sister     No Known Problems Sister     No Known Problems Sister     Asthma Brother     No Known Problems Brother     No Known Problems Maternal Grandmother     Cancer Maternal Grandfather     Breast cancer Paternal Grandmother     No Known Problems Paternal Grandfather     Cancer Other         Breat    Diabetes Other     Hypertension Other     Anesthesia problems Neg Hx     Broken bones Neg Hx     Clotting disorder Neg Hx     Collagen disease Neg Hx      Dislocations Neg Hx     Osteoporosis Neg Hx     Rheumatologic disease Neg Hx     Scoliosis Neg Hx     Severe sprains Neg Hx        SOCIAL HISTORY  Social History     Socioeconomic History    Marital status: Single    Number of children: 3    Highest education level: High school graduate   Tobacco Use    Smoking status: Former     Types: Cigars     Start date:      Quit date: 2023     Years since quittin.2     Passive exposure: Never    Smokeless tobacco: Never    Tobacco comments:     Smoke 2 cigars a day.   Vaping Use    Vaping status: Every Day    Last attempt to quit: 2023    Substances: Flavoring    Devices: Disposable   Substance and Sexual Activity    Alcohol use: Not Currently    Drug use: Not Currently    Sexual activity: Yes     Birth control/protection: None       REVIEW OF SYSTEMS  Review of Systems    All systems reviewed and negative except for those discussed in HPI.       PHYSICAL EXAM    I have reviewed the triage vital signs and nursing notes.  Vitals:    24 0858   BP: 138/77   Pulse:    Resp: 16   Temp:    SpO2: 99%     Physical Exam    Vital signs: Reviewed in nurses notes    General: Patient is awake alert no acute distress    HEENT: Pupils equal round responsive to light.  Extra-ocular movements are intact.  No scleral icterus.  Nasopharynx is clear.  Oropharynx is clear with moist mucous membranes.  No masses noted    Neck:   Supple without lymphadenopathy    Respiratory:   Nonlabored respirations.  Clear to auscultation bilaterally.  Equal breath sounds bilaterally.  No wheezes or stridor noted.    Cardiovascular: Regular rate and rhythm.  No murmur.  Equal pulses in bilateral lower extremities without edema.    Abdomen: Nondistended nontender x 4 quadrants.  No suprapubic tenderness    Skin:   Warm and dry.  No rashes noted    Neurological examination: Patient is awake alert oriented x4.  Speech is normal.  No facial palsy.  No focal motor or sensory deficits.  LAB  RESULTS  No results found for this or any previous visit (from the past 24 hours).      Ordered the above labs and independently interpreted results.  My findings will be discussed in the ED course or medical decision making section below      PROCEDURES:  Procedures      RADIOLOGY RESULTS  No Radiology Exams Resulted Within Past 24 Hours     Ordered the above noted radiological studies.  Independently interpreted by me.  My findings will be discussed in the medical decision section below.     PROGRESS, DATA ANALYSIS, CONSULTS AND MEDICAL DECISION MAKING    Please note that this section constitutes my independent interpretation of clinical data including lab results, radiology, EKG's.  This constitutes my independent professional opinion regarding differential diagnosis and management of this patient.  It may include any factors such as history from outside sources, review of external records, social determinants of health, management of medications, response to those treatments, and discussions with other providers.    ED Course as of 12/30/24 1751   Fri Dec 27, 2024   1303 Chlamydia trachomatis, Neisseria gonorrhoeae, PCR - Urine, Urine, Random Void [SS]      ED Course User Index  [SS] Tahmina Jesus PA-C     Orders placed during this visit:  Orders Placed This Encounter   Procedures    NuSwab BV & Candida - Swab, Vagina    Urine Culture - Urine,    Urinalysis With Culture If Indicated - Urine, Clean Catch    Urinalysis, Microscopic Only - Urine, Clean Catch    Tyler Urine Culture Tube -       Medications   cefTRIAXone (ROCEPHIN) 500 mg in lidocaine (XYLOCAINE) 1 % 1 mL IM only syringe (500 mg Intramuscular Given 12/22/24 0933)            Medical Decision Making          DIAGNOSIS  Final diagnoses:   Acute UTI (urinary tract infection)          Medication List        New Prescriptions      fluconazole 150 MG tablet  Commonly known as: DIFLUCAN  Take one tablet today, then take second tablet in 2 days      nitrofurantoin (macrocrystal-monohydrate) 100 MG capsule  Commonly known as: MACROBID  Take 1 capsule by mouth 2 (Two) Times a Day for 7 days.            ASK your doctor about these medications      doxycycline 100 MG capsule  Commonly known as: MONODOX  Take 1 capsule by mouth 2 (Two) Times a Day for 7 days.  Ask about: Should I take this medication?               Where to Get Your Medications        These medications were sent to [id number] - [place], [place] - [address] AT [facility] - [phone]  - [phone]   [address], [place] [address]      Phone: [phone]   doxycycline 100 MG capsule  fluconazole 150 MG tablet  nitrofurantoin (macrocrystal-monohydrate) 100 MG capsule         FOLLOW-UP  Provider, No Known  [id number] [phone]    In 1 week          Latest Documented Vital Signs:  As of 17:51 EST  BP- 138/77 HR- 66 Temp- 97 °F (36.1 °C) (Temporal) O2 sat- 99%    Appropriate PPE utilized throughout this patient encounter to include mask, if indicated, per current protocol. Hand hygiene was performed before donning PPE and after removal when leaving the room.    Please note that portions of this were completed with a voice recognition program.     Note Disclaimer: At [facility], we believe that sharing information builds trust and better relationships. You are receiving this note because you are receiving care at [facility] or recently visited. It is possible you will see health information before a provider has talked with you about it. This kind of information can be easy to misunderstand. To help you fully understand what it means for your health, we urge you to discuss this note with your provider.

## 2024-12-22 NOTE — DISCHARGE INSTRUCTIONS
Today we did send your swabs for testing for bacterial vaginosis, sexual transmitted disease, and yeast vaginitis.    We did go ahead and treat you for sexually-transmitted diseases utilizing a medication called Rocephin.  This was the intermuscular shot that was given to you.  I also sent in a 7-day course of a medicine called doxycycline.  Please finish entire course of therapy    Several of your test are sent to a reference lab offsite.  They should start resulting in approximately 3 to 4 days.  We will notify you if any of these test are positive.    Return to ER for increasing pain, fever, other difficulties    I did provide you with a 2-day work note to use if needed at your discretion    Please read all of the instructions in this handout.  If you receive prescriptions please fill them and take them as directed.  Please call your primary care physician for follow-up appointment in the next 5 to 7 days.  If you do not have a physician you may call the [facility] referral line at [phone].    You may return to the emergency department at any time for any concerns such as worsening symptoms.  If you received a work or school note it will be printed at the back of this packet.

## 2024-12-24 LAB
A VAGINAE DNA VAG QL NAA+PROBE: ABNORMAL SCORE
BVAB2 DNA VAG QL NAA+PROBE: ABNORMAL SCORE
C ALBICANS DNA VAG QL NAA+PROBE: POSITIVE
C GLABRATA DNA VAG QL NAA+PROBE: POSITIVE
MEGA1 DNA VAG QL NAA+PROBE: ABNORMAL SCORE

## 2024-12-25 LAB — BACTERIA SPEC AEROBE CULT: ABNORMAL

## 2024-12-27 RX ORDER — FLUCONAZOLE 150 MG/1
TABLET ORAL
Qty: 2 TABLET | Refills: 0 | Status: SHIPPED | OUTPATIENT
Start: 2024-12-27

## 2024-12-27 RX ORDER — NITROFURANTOIN 25; 75 MG/1; MG/1
100 CAPSULE ORAL 2 TIMES DAILY
Qty: 14 CAPSULE | Refills: 0 | Status: SHIPPED | OUTPATIENT
Start: 2024-12-27 | End: 2025-01-03

## 2025-03-06 NOTE — TELEPHONE ENCOUNTER
Pt called after hours line with concerns of redness, pain, and swelling of her arm. Pt recently diagnosed with upper extremity DVT related to PICC line for TPN. Currently on lovenox injections.    Pt is advised to return to ED for eval of new findings  
Health Care Proxy (HCP)/Living Will

## 2025-03-28 ENCOUNTER — APPOINTMENT (OUTPATIENT)
Dept: CT IMAGING | Facility: HOSPITAL | Age: 30
End: 2025-03-28
Payer: COMMERCIAL

## 2025-03-28 ENCOUNTER — HOSPITAL ENCOUNTER (EMERGENCY)
Facility: HOSPITAL | Age: 30
Discharge: HOME | End: 2025-03-28
Attending: EMERGENCY MEDICINE
Payer: COMMERCIAL

## 2025-03-28 VITALS
SYSTOLIC BLOOD PRESSURE: 108 MMHG | OXYGEN SATURATION: 100 % | WEIGHT: 180 LBS | BODY MASS INDEX: 26.66 KG/M2 | DIASTOLIC BLOOD PRESSURE: 66 MMHG | HEIGHT: 69 IN | HEART RATE: 65 BPM | TEMPERATURE: 97.7 F | RESPIRATION RATE: 18 BRPM

## 2025-03-28 DIAGNOSIS — R51.9 NONINTRACTABLE HEADACHE, UNSPECIFIED CHRONICITY PATTERN, UNSPECIFIED HEADACHE TYPE: Primary | ICD-10-CM

## 2025-03-28 LAB
ANION GAP SERPL CALCULATED.3IONS-SCNC: 7.2 MMOL/L (ref 5–15)
BASOPHILS # BLD AUTO: 0.01 10*3/MM3 (ref 0–0.2)
BASOPHILS NFR BLD AUTO: 0.3 % (ref 0–1.5)
BUN SERPL-MCNC: 9 MG/DL (ref 6–20)
BUN/CREAT SERPL: 18.4 (ref 7–25)
CALCIUM SPEC-SCNC: 8.7 MG/DL (ref 8.6–10.5)
CHLORIDE SERPL-SCNC: 108 MMOL/L (ref 98–107)
CO2 SERPL-SCNC: 21.8 MMOL/L (ref 22–29)
CREAT SERPL-MCNC: 0.49 MG/DL (ref 0.57–1)
DEPRECATED RDW RBC AUTO: 41.2 FL (ref 37–54)
EGFRCR SERPLBLD CKD-EPI 2021: 129.4 ML/MIN/1.73
EOSINOPHIL # BLD AUTO: 0.07 10*3/MM3 (ref 0–0.4)
EOSINOPHIL NFR BLD AUTO: 1.9 % (ref 0.3–6.2)
ERYTHROCYTE [DISTWIDTH] IN BLOOD BY AUTOMATED COUNT: 14.9 % (ref 12.3–15.4)
GLUCOSE SERPL-MCNC: 96 MG/DL (ref 65–99)
HCG SERPL QL: NEGATIVE
HCT VFR BLD AUTO: 28.2 % (ref 34–46.6)
HGB BLD-MCNC: 8.6 G/DL (ref 12–15.9)
IMM GRANULOCYTES # BLD AUTO: 0.01 10*3/MM3 (ref 0–0.05)
IMM GRANULOCYTES NFR BLD AUTO: 0.3 % (ref 0–0.5)
LYMPHOCYTES # BLD AUTO: 1.94 10*3/MM3 (ref 0.7–3.1)
LYMPHOCYTES NFR BLD AUTO: 51.7 % (ref 19.6–45.3)
MCH RBC QN AUTO: 23.3 PG (ref 26.6–33)
MCHC RBC AUTO-ENTMCNC: 30.5 G/DL (ref 31.5–35.7)
MCV RBC AUTO: 76.4 FL (ref 79–97)
MONOCYTES # BLD AUTO: 0.26 10*3/MM3 (ref 0.1–0.9)
MONOCYTES NFR BLD AUTO: 6.9 % (ref 5–12)
NEUTROPHILS NFR BLD AUTO: 1.46 10*3/MM3 (ref 1.7–7)
NEUTROPHILS NFR BLD AUTO: 38.9 % (ref 42.7–76)
NRBC BLD AUTO-RTO: 0 /100 WBC (ref 0–0.2)
PLATELET # BLD AUTO: 257 10*3/MM3 (ref 140–450)
PMV BLD AUTO: 10.6 FL (ref 6–12)
POTASSIUM SERPL-SCNC: 3.6 MMOL/L (ref 3.5–5.2)
RBC # BLD AUTO: 3.69 10*6/MM3 (ref 3.77–5.28)
SODIUM SERPL-SCNC: 137 MMOL/L (ref 136–145)
WBC NRBC COR # BLD AUTO: 3.75 10*3/MM3 (ref 3.4–10.8)

## 2025-03-28 PROCEDURE — 96375 TX/PRO/DX INJ NEW DRUG ADDON: CPT

## 2025-03-28 PROCEDURE — 96374 THER/PROPH/DIAG INJ IV PUSH: CPT

## 2025-03-28 PROCEDURE — 84703 CHORIONIC GONADOTROPIN ASSAY: CPT | Performed by: EMERGENCY MEDICINE

## 2025-03-28 PROCEDURE — 25810000003 LACTATED RINGERS SOLUTION: Performed by: EMERGENCY MEDICINE

## 2025-03-28 PROCEDURE — 99283 EMERGENCY DEPT VISIT LOW MDM: CPT

## 2025-03-28 PROCEDURE — 85025 COMPLETE CBC W/AUTO DIFF WBC: CPT | Performed by: EMERGENCY MEDICINE

## 2025-03-28 PROCEDURE — 25010000002 DIPHENHYDRAMINE PER 50 MG: Performed by: EMERGENCY MEDICINE

## 2025-03-28 PROCEDURE — 80048 BASIC METABOLIC PNL TOTAL CA: CPT | Performed by: EMERGENCY MEDICINE

## 2025-03-28 PROCEDURE — 25010000002 PROCHLORPERAZINE 10 MG/2ML SOLUTION: Performed by: EMERGENCY MEDICINE

## 2025-03-28 RX ORDER — DIPHENHYDRAMINE HYDROCHLORIDE 50 MG/ML
25 INJECTION, SOLUTION INTRAMUSCULAR; INTRAVENOUS ONCE
Status: COMPLETED | OUTPATIENT
Start: 2025-03-28 | End: 2025-03-28

## 2025-03-28 RX ORDER — PROCHLORPERAZINE EDISYLATE 5 MG/ML
10 INJECTION INTRAMUSCULAR; INTRAVENOUS ONCE
Status: COMPLETED | OUTPATIENT
Start: 2025-03-28 | End: 2025-03-28

## 2025-03-28 RX ORDER — DIPHENHYDRAMINE HYDROCHLORIDE 50 MG/ML
25 INJECTION, SOLUTION INTRAMUSCULAR; INTRAVENOUS ONCE
Status: DISCONTINUED | OUTPATIENT
Start: 2025-03-28 | End: 2025-03-28 | Stop reason: HOSPADM

## 2025-03-28 RX ADMIN — SODIUM CHLORIDE, POTASSIUM CHLORIDE, SODIUM LACTATE AND CALCIUM CHLORIDE 1000 ML: 600; 310; 30; 20 INJECTION, SOLUTION INTRAVENOUS at 06:11

## 2025-03-28 RX ADMIN — DIPHENHYDRAMINE HYDROCHLORIDE 25 MG: 50 INJECTION, SOLUTION INTRAMUSCULAR; INTRAVENOUS at 06:11

## 2025-03-28 RX ADMIN — PROCHLORPERAZINE EDISYLATE 10 MG: 5 INJECTION, SOLUTION INTRAMUSCULAR; INTRAVENOUS at 06:13

## 2025-03-28 NOTE — ED NOTES
"Pt to ED c/o headache \"for awhile\" with vision changes and nausea that she notices gets worse after her menstrual cycles. Hx of migraines when younger. Takes tylenol with temporary relief.  "

## 2025-03-28 NOTE — DISCHARGE INSTRUCTIONS
You need to follow up with your PCP as soon as possible. Return to the emergency room for increased headache, fevers, neck pain, confusion, vomiting, numbness, tingling, changes in vision, weakness, or change in condition.

## 2025-03-28 NOTE — ED PROVIDER NOTES
EMERGENCY DEPARTMENT ENCOUNTER    History  Chief Complaint   Patient presents with    Headache       History provided by: Patient    HPI:  Context: Osbaldo Mayo is a 29 y.o. female with a medical history of anemia, heart murmur, DVT (no current AC), hypoglycemia who presents to the ED c/o acute headache. She has been having a few weeks of symptoms, but it seemed worse today, which prompted her presentation to the ED. She reports frontal throbbing headache with associated nausea, photophobia, and phonophobia. She denies any fever or neck stiffness. Headaches typically come on gradually and get worse. Tylenol is minimally effective.       Past Medical History:  Active Ambulatory Problems     Diagnosis Date Noted    Hyperemesis gravidarum 10/16/2023    Normal labor and delivery 05/20/2024     Resolved Ambulatory Problems     Diagnosis Date Noted    Pregnancy 11/05/2023     Past Medical History:   Diagnosis Date    Anemia     Chlamydia     Heart murmur     History of maternal deep vein thrombosis (DVT)     Hypoglycemia     Uterine fibroids affecting pregnancy, antepartum        Past Surgical History:  Past Surgical History:   Procedure Laterality Date    DILATATION AND CURETTAGE      WISDOM TOOTH EXTRACTION           Family History:  Family History   Problem Relation Age of Onset    Hypertension Mother     Diabetes Mother     No Known Problems Father     Asthma Sister     No Known Problems Sister     No Known Problems Sister     No Known Problems Sister     Asthma Brother     No Known Problems Brother     No Known Problems Maternal Grandmother     Cancer Maternal Grandfather     Breast cancer Paternal Grandmother     No Known Problems Paternal Grandfather     Cancer Other         Breat    Diabetes Other     Hypertension Other     Anesthesia problems Neg Hx     Broken bones Neg Hx     Clotting disorder Neg Hx     Collagen disease Neg Hx     Dislocations Neg Hx     Osteoporosis Neg Hx     Rheumatologic disease Neg Hx      Scoliosis Neg Hx     Severe sprains Neg Hx          Social History:  Social History     Socioeconomic History    Marital status: Single    Number of children: 3    Highest education level: High school graduate   Tobacco Use    Smoking status: Former     Types: Cigars     Start date:      Quit date: 2023     Years since quittin.5     Passive exposure: Never    Smokeless tobacco: Never    Tobacco comments:     Smoke 2 cigars a day.   Vaping Use    Vaping status: Every Day    Last attempt to quit: 2023    Substances: Flavoring    Devices: Disposable   Substance and Sexual Activity    Alcohol use: Not Currently    Drug use: Not Currently    Sexual activity: Yes     Birth control/protection: None         Allergies:  Patient has no known allergies.        Physical Exam  ED Triage Vitals   Temp Heart Rate Resp BP SpO2   25 0542 25 0542 25 0542 25 0544 25 0542   97.7 °F (36.5 °C) 67 16 111/72 100 %      Temp src Heart Rate Source Patient Position BP Location FiO2 (%)   25 0542 -- -- -- --   Tympanic         Physical Exam  Constitutional:       Appearance: Normal appearance.   HENT:      Head: Normocephalic and atraumatic.   Eyes:      Conjunctiva/sclera: Conjunctivae normal.      Pupils: Pupils are equal, round, and reactive to light.   Pulmonary:      Effort: Pulmonary effort is normal. No respiratory distress.   Musculoskeletal:      Cervical back: Neck supple. No rigidity.   Neurological:      Mental Status: She is alert and oriented to person, place, and time.      Cranial Nerves: No cranial nerve deficit.      Sensory: No sensory deficit.      Motor: No weakness.           Medications Given in ER:   Medications   diphenhydrAMINE (BENADRYL) injection 25 mg (25 mg Intravenous Given 3/28/25 0611)   prochlorperazine (COMPAZINE) injection 10 mg (10 mg Intravenous Given 3/28/25 0613)   lactated ringers bolus 1,000 mL (0 mL Intravenous Stopped 3/28/25 0644)          Orders Placed:  Orders Placed This Encounter   Procedures    Basic Metabolic Panel    hCG, Serum, Qualitative    CBC Auto Differential    Undress & Gown    CBC & Differential         Outpatient Medication Management:   No current Epic-ordered facility-administered medications on file.     Current Outpatient Medications Ordered in Epic   Medication Sig Dispense Refill    docusate sodium (Colace) 100 MG capsule Take 1 capsule by mouth 2 (Two) Times a Day. 60 capsule 1    ferrous sulfate 325 (65 FE) MG tablet Take 1 tablet by mouth Daily With Breakfast. 30 tablet 2    fluconazole (DIFLUCAN) 150 MG tablet Take one tablet today, then take second tablet in 2 days 2 tablet 0    ibuprofen (ADVIL,MOTRIN) 600 MG tablet Take 1 tablet by mouth Every 6 (Six) Hours As Needed for Mild Pain (First Line: Mild pain.). 60 tablet 0    naproxen (NAPROSYN) 500 MG tablet Take 1 tablet by mouth 2 (Two) Times a Day With Meals.      oxyCODONE (OXY-IR) 5 MG capsule Take 1 capsule by mouth Every 6 (Six) Hours As Needed for Moderate Pain for up to 3 doses. 3 capsule 0    Polyethyl Glycol-Propyl Glycol (Systane) 0.4-0.3 % gel Apply 2 drops to eye(s) as directed by provider Every 4 (Four) Hours As Needed (Eye irritation). 8 mL 0    prenatal vitamin (prenatal, CLASSIC, vitamin) tablet Take 1 tablet by mouth Daily.             Medical Decision Making:  All labs have been independently interpreted by me.  All radiology studies have been reviewed by me. All EKG's have been independently viewed and interpreted by me.  Discussion below represents my analysis of pertinent findings related to patient's condition, differential diagnosis, treatment plan and final disposition.    Differential Diagnosis includes but not limited to: primary headache disorder, migraine, tension headache, cluster headache, electrolyte disturbance     Review of prior external notes (non-ED) -and- Review of prior external test results outside of this encounter:  I  reviewed the urgent care office visit note 8/18/2024, patient seen for vaginal odor    Labs Results:  Recent Results (from the past 24 hours)   Basic Metabolic Panel    Collection Time: 03/28/25  6:17 AM    Specimen: Blood   Result Value Ref Range    Glucose 96 65 - 99 mg/dL    BUN 9 6 - 20 mg/dL    Creatinine 0.49 (L) 0.57 - 1.00 mg/dL    Sodium 137 136 - 145 mmol/L    Potassium 3.6 3.5 - 5.2 mmol/L    Chloride 108 (H) 98 - 107 mmol/L    CO2 21.8 (L) 22.0 - 29.0 mmol/L    Calcium 8.7 8.6 - 10.5 mg/dL    BUN/Creatinine Ratio 18.4 7.0 - 25.0    Anion Gap 7.2 5.0 - 15.0 mmol/L    eGFR 129.4 >60.0 mL/min/1.73   hCG, Serum, Qualitative    Collection Time: 03/28/25  6:17 AM    Specimen: Blood   Result Value Ref Range    HCG Qualitative Negative Negative   CBC Auto Differential    Collection Time: 03/28/25  6:17 AM    Specimen: Blood   Result Value Ref Range    WBC 3.75 3.40 - 10.80 10*3/mm3    RBC 3.69 (L) 3.77 - 5.28 10*6/mm3    Hemoglobin 8.6 (L) 12.0 - 15.9 g/dL    Hematocrit 28.2 (L) 34.0 - 46.6 %    MCV 76.4 (L) 79.0 - 97.0 fL    MCH 23.3 (L) 26.6 - 33.0 pg    MCHC 30.5 (L) 31.5 - 35.7 g/dL    RDW 14.9 12.3 - 15.4 %    RDW-SD 41.2 37.0 - 54.0 fl    MPV 10.6 6.0 - 12.0 fL    Platelets 257 140 - 450 10*3/mm3    Neutrophil % 38.9 (L) 42.7 - 76.0 %    Lymphocyte % 51.7 (H) 19.6 - 45.3 %    Monocyte % 6.9 5.0 - 12.0 %    Eosinophil % 1.9 0.3 - 6.2 %    Basophil % 0.3 0.0 - 1.5 %    Immature Grans % 0.3 0.0 - 0.5 %    Neutrophils, Absolute 1.46 (L) 1.70 - 7.00 10*3/mm3    Lymphocytes, Absolute 1.94 0.70 - 3.10 10*3/mm3    Monocytes, Absolute 0.26 0.10 - 0.90 10*3/mm3    Eosinophils, Absolute 0.07 0.00 - 0.40 10*3/mm3    Basophils, Absolute 0.01 0.00 - 0.20 10*3/mm3    Immature Grans, Absolute 0.01 0.00 - 0.05 10*3/mm3    nRBC 0.0 0.0 - 0.2 /100 WBC     Lab Comments:  My independent interpretation of the above labs: microcytic anemia      (Social Determinants of Health): Health Care System (Health Coverage, Provider  Availability, Provider Linguistic and Cultural Competency, Quality of Care)    Rationale:  HA not concerning for SAH or meningitis based on history/exam/evaluation.  -Headache not sudden onset  -Not worst Ha ever  -No neurologic symptoms with the headache( no visual changes. no numbness, tingling, weakness.  No confusion.  No difficulty with speech)  -No fever or chills.  Afebrile  -No trauma  -No neck pain  - Full Range of motion of neck with no pain, JOLT test negative. No meningismus.  -Well-appearing on exam  -Normal neurologic exam  - No temporal artery tenderness.    Will plan migraine cocktail, head CT, will reassess    ED Course as of 03/28/25 1827   Fri Mar 28, 2025   2320 First look: Patient presents with a complaint of a headache.  She has had a few weeks of headache, but is acutely worse tonight.  She notes associated blurry vision, photophobia and phonophobia.  She denies any fever or neck stiffness.  This is not the worst headache of life.  She notes that symptoms come on gradually up and worsen.  Does have a history of previous migraines.  Has taken Tylenol without much relief.  Neurologic exam is benign.  Will plan on labs, CT imaging, migraine cocktail.   [JK]   6819 I saw and reevaluated the patient at request of RN. She is having a reaction to compazine to include shakiness and anxiety. She has no dystonia. I informed her this could be treated with additional benadryl which will likely yeyo her symptoms. However, she is refusing further care and would like to be discharged. I discussed our plan of obtaining a head ct for further evaluation of headache to evaluate for IC mass, ICH, etc. She continues to decline. She is alert, oriented, and has clinical decision making capacity. Per her request she will be discharged and I have instructed her to take po benadryl upon returning home and she is welcome to return to the ED is symptoms do not improve. Discussed anemia on labs, she can start an OTC iron  supplement and follow-up with pcp.  [JK]      ED Course User Index  [JK] Sherry Peña MD         Complexity of Care:  Admission was considered but after careful review of the patient's presentation, physical examination, diagnostic results, and response to treatment the patient may be safely discharged with outpatient follow-up.    Diagnosis:  Final diagnoses:   Nonintractable headache, unspecified chronicity pattern, unspecified headache type       Follow Up:  [facility] PROVIDER FINDER  [address] [place] [id number] [phone]  Call in 2 days  For reevaluation, To establish care with PCP    [facility] EMERGENCY DEPARTMENT  [address] [address] [address] [phone]    As needed, If symptoms worsen        Parts of this note may be an electronic transcription/translation of spoken language to printed text using the Dragon dictation system        Provider Note Signed by:     Sherry Peña MD  03/28/25 3608